# Patient Record
Sex: FEMALE | Race: WHITE | NOT HISPANIC OR LATINO | Employment: OTHER | URBAN - METROPOLITAN AREA
[De-identification: names, ages, dates, MRNs, and addresses within clinical notes are randomized per-mention and may not be internally consistent; named-entity substitution may affect disease eponyms.]

---

## 2019-09-10 ENCOUNTER — OFFICE VISIT CONVERTED (OUTPATIENT)
Dept: CARDIOLOGY | Facility: CLINIC | Age: 74
End: 2019-09-10
Attending: INTERNAL MEDICINE

## 2019-09-13 ENCOUNTER — CONVERSION ENCOUNTER (OUTPATIENT)
Dept: SURGERY | Facility: CLINIC | Age: 74
End: 2019-09-13

## 2019-09-13 ENCOUNTER — OFFICE VISIT CONVERTED (OUTPATIENT)
Dept: SURGERY | Facility: CLINIC | Age: 74
End: 2019-09-13
Attending: SURGERY

## 2019-09-24 ENCOUNTER — OFFICE VISIT CONVERTED (OUTPATIENT)
Dept: CARDIOLOGY | Facility: CLINIC | Age: 74
End: 2019-09-24
Attending: INTERNAL MEDICINE

## 2019-10-15 ENCOUNTER — OFFICE VISIT CONVERTED (OUTPATIENT)
Dept: SURGERY | Facility: CLINIC | Age: 74
End: 2019-10-15
Attending: SURGERY

## 2019-10-15 ENCOUNTER — CONVERSION ENCOUNTER (OUTPATIENT)
Dept: SURGERY | Facility: CLINIC | Age: 74
End: 2019-10-15

## 2021-04-15 ENCOUNTER — OFFICE VISIT CONVERTED (OUTPATIENT)
Dept: ORTHOPEDIC SURGERY | Facility: CLINIC | Age: 76
End: 2021-04-15
Attending: ORTHOPAEDIC SURGERY

## 2021-05-11 NOTE — H&P
History and Physical      Patient Name: Maria Del Rosario Colmenares   Patient ID: [id number]   Sex: Female   YOB: 1945    Referring Provider: Chelsey PARRA    Visit Date: April 15, 2021    Provider: Cici Stanton MD   Location: [facility]   Location Address: [address] [place] [address]  [id number]   Location Phone: [phone]          Chief Complaint  · Left Hip Pain      History Of Present Illness  Maria Del Rosario Colmenares is a 75 year old /White female who presents today to [facility]. Patient is here for evaluation of her left hip and buttock pain. A couple months ago she started having some low back pain that turned into buttock pain and now has some thigh pain. She has taken some Lodine. This has been going on for about 2 months. She has a history of previous back problem about 15 years ago that sound like it required 2 discectomies. Her pain today is located more buttock and down her leg.       Past Medical History  Arthritis; High cholesterol; Hypothyroidism; Screening for colon cancer; Shortness Of Air; Thyroid disorder         Past Surgical History  Appendectomy; Colonoscopy; Hysterectomy; Hysterectomy-Abdominal         Medication List  Lodine 400 mg oral tablet; Synthroid 88 mcg oral tablet         Allergy List  NO KNOWN DRUG ALLERGIES         Family Medical History  Lung Neoplasm, Malignant; Cancer, Unspecified; -Father's Family History Unknown         Social History  Alcohol (Unknown); Alcohol Use (Current some day); Caffeine (Current every day); Recreational Drug Use (Never); Retired.; Second hand smoke exposure (Never); Tobacco (Former);          Review of Systems  · Constitutional  o Denies  o : fever, chills, weight loss  · Cardiovascular  o Denies  o : chest pain, shortness of breath  · Gastrointestinal  o Denies  o : liver disease, heartburn, nausea, blood in stools  · Genitourinary  o Denies  o : painful urination, blood in  "urine  · Integument  o Denies  o : rash, itching  · Neurologic  o Denies  o : headache, weakness, loss of consciousness  · Musculoskeletal  o Denies  o : painful, swollen joints  · Psychiatric  o Denies  o : drug/alcohol addiction, anxiety, depression      Vitals  Date Time BP Position Site L\R Cuff Size HR RR TEMP (F) WT  HT  BMI kg/m2 BSA m2 O2 Sat FR L/min FiO2        04/15/2021 10:25 AM      59 - R   151lbs 4oz 5'  5\" 25.17 1.77 100 %            Physical Examination  · Constitutional  o Appearance  o : well developed, well-nourished, no obvious deformities present  · Head and Face  o Head  o :   § Inspection  § : normocephalic  o Face  o :   § Inspection  § : no facial lesions  · Eyes  o Conjunctivae  o : conjunctivae normal  o Sclerae  o : sclerae white  · Ears, Nose, Mouth and Throat  o Ears  o :   § External Ears  § : appearance within normal limits  § Hearing  § : intact  o Nose  o :   § External Nose  § : appearance normal  · Neck  o Inspection/Palpation  o : normal appearance  o Range of Motion  o : full range of motion  · Respiratory  o Respiratory Effort  o : breathing unlabored  o Inspection of Chest  o : normal appearance  o Auscultation of Lungs  o : no audible wheezing or rales  · Cardiovascular  o Heart  o : regular rate  · Gastrointestinal  o Abdominal Examination  o : soft and non-tender  · Skin and Subcutaneous Tissue  o General Inspection  o : intact, no rashes  · Psychiatric  o General  o : Alert and oriented x3  o Judgement and Insight  o : judgment and insight intact  o Mood and Affect  o : mood normal, affect appropriate  · Left Hip  o Inspection  o : Sensation intact. Pulse is normal. She has mildly positive straight-leg-raise. She walks with no appreciable limp. She has good range of motion. X-rays of her hip shows very mild arthritis.  · In Office Procedures  o View  o : AP/LATERAL  o Site  o : left, hip   o Indication  o : Left hip pain   o Study  o : X-rays ordered, taken in the " office, and reviewed today.  o Xray  o : X-rays of her hip shows very mild arthritis.  o Comparative Data  o : No comparative data found          Assessment  · Primary mild osteoarthritis of left hip     715.15/M16.10  · Low Back Pain     724.2/M54.5  · Left Pain: Hip     719.45/M25.559  · Sciatic leg pain     724.3/M54.30      Plan  · Orders  o Hip (Left) 2 or more views (includes AP Pelvis) [facility] Preferred View ([id number]) - 719.45/M25.559 - 04/15/2021  · Medications  o Medications have been Reconciled  o Transition of Care or Provider Policy  · Instructions  o Reviewed the patient's Past Medical, Social, and Family history as well as the ROS at today's visit, no changes.  o Call or return if worsening symptoms.  o This note is transcribed by Sarai La /josé antonio  o Going to get her in to see a neurosurgeon and see her back to see how she is doing.             Electronically Signed by: Sarai La, -Author on April 16, 2021 03:53:30 PM  Electronically Co-signed by: Cici Stanton MD -Reviewer on April 19, 2021 09:36:51 PM

## 2021-05-14 VITALS — BODY MASS INDEX: 25.2 KG/M2 | HEART RATE: 59 BPM | OXYGEN SATURATION: 100 % | WEIGHT: 151.25 LBS | HEIGHT: 65 IN

## 2021-05-15 VITALS
SYSTOLIC BLOOD PRESSURE: 146 MMHG | DIASTOLIC BLOOD PRESSURE: 113 MMHG | HEART RATE: 123 BPM | HEIGHT: 66 IN | WEIGHT: 146 LBS | BODY MASS INDEX: 23.46 KG/M2

## 2021-05-15 VITALS
WEIGHT: 134 LBS | HEIGHT: 66 IN | SYSTOLIC BLOOD PRESSURE: 138 MMHG | BODY MASS INDEX: 21.53 KG/M2 | DIASTOLIC BLOOD PRESSURE: 60 MMHG | HEART RATE: 92 BPM

## 2021-05-15 VITALS — BODY MASS INDEX: 21.69 KG/M2 | HEIGHT: 66 IN | WEIGHT: 135 LBS | RESPIRATION RATE: 16 BRPM

## 2021-05-15 VITALS — HEIGHT: 66 IN | RESPIRATION RATE: 16 BRPM | BODY MASS INDEX: 22.98 KG/M2 | WEIGHT: 143 LBS

## 2021-06-01 ENCOUNTER — OFFICE VISIT CONVERTED (OUTPATIENT)
Dept: CARDIOLOGY | Facility: CLINIC | Age: 76
End: 2021-06-01
Attending: INTERNAL MEDICINE

## 2021-06-01 ENCOUNTER — TRANSCRIBE ORDERS (OUTPATIENT)
Dept: CARDIOLOGY | Facility: CLINIC | Age: 76
End: 2021-06-01

## 2021-06-01 DIAGNOSIS — I48.91 ATRIAL FIBRILLATION, UNSPECIFIED TYPE (HCC): Primary | ICD-10-CM

## 2021-06-01 DIAGNOSIS — I50.31 ACUTE DIASTOLIC CHF (CONGESTIVE HEART FAILURE) (HCC): ICD-10-CM

## 2021-06-05 NOTE — PROGRESS NOTES
Progress Note      Patient Name: Maria Del Rosario Colmenares   Patient ID: [id number]   Sex: Female   YOB: 1945    Referring Provider: Chelsey PARRA    Visit Date: June 1, 2021    Provider: Jermaine Powell MD   Location: [facility]   Location Address: [address] [place] [address], [address]   [place]  [id number]   Location Phone: [phone]          Chief Complaint     Shortness of breath, atrial fibrillation.       History Of Present Illness  REFERRING CARE PROVIDER: Chelsey PARRA   Maria Del Rosario Colmenares is a 76-year-old female with hypothyroidism and paroxysmal atrial fibrillation. She was previously seen in 2019 for paroxysmal atrial fibrillation. At that time, she was on 50 mg of Metoprolol twice daily. She was lost for followup since then. The patient reports that she did not have any symptoms, hence did not come for followup, however, recently she started developing severe shortness of breath, some swelling of the feet, and also symptoms suggestive of orthopnea. She could not lay down flat since she felt drowned. She was seen by the primary care physician's office and was noted to have atrial fibrillation. She was treated with oral Lasix with potassium supplementation and low dose Metoprolol was restarted. Within one week, most of her symptoms subsided. Orthopnea completely resolved. Shortness of breath significantly improved, and pedal edema improved as well. Currently she does not feel heart racing or pounding. Denies having any chest pain. Some swelling of the feet is still persisting. She was also prescribed Eliquis for anticoagulation which she has not started taking.   PAST MEDICAL HISTORY: (1) Paroxysmal atrial fibrillation. (2) Hypothyroidism. (3) History of perforated appendicitis, status post surgery in 2019.   PSYCHOSOCIAL HISTORY: Rare alcohol use. Previously smoked but quit.   CURRENT MEDICATIONS: Synthroid 88 mcg daily; Metoprolol ER 25 mg daily; Furosemide 20 mg daily;  "potassium 10 mEq daily.      ALLERGIES:  No known drug allergies.       Review of Systems  · Cardiovascular  o Admits  o : swelling (feet, ankles, hands)  o Denies  o : palpitations (fast, fluttering, or skipping beats), shortness of breath while walking or lying flat, chest pain or angina pectoris   · Respiratory  o Denies  o : chronic or frequent cough      Vitals  Date Time BP Position Site L\R Cuff Size HR RR TEMP (F) WT  HT  BMI kg/m2 BSA m2 O2 Sat FR L/min FiO2        06/01/2021 02:15 /90 Sitting    114 - R   145lbs 0oz 5'  6\" 23.4 1.75             Physical Examination  · Respiratory  o Auscultation of Lungs  o : Clear to auscultation bilaterally. No crackles or rhonchi.  · Cardiovascular  o Heart  o : Irregularly irregular, tachycardic. No murmur, rubs, or gallops.   · Gastrointestinal  o Abdominal Examination  o : Soft, nontender, nondistended. No free fluid. Bowel sounds heard in all four quadrants.  · Extremities  o Extremities  o : Trace edema bilaterally. Engorged veins noted bilaterally.          Assessment     ASSESSMENT AND PLAN:  1.  Paroxysmal atrial fibrillation: Patient is back in atrial fibrillation and currently ventricular rates are not well controlled. Recently restarted on metoprolol 25 mg daily. Of note, the patient was not taking Metoprolol for more than a year. We will increase the dose of Metoprolol to 25 mg twice daily for now. Encouraged patient to start taking Eliquis since there is a high risk of stroke based on her CHADS-VASc score. She agreed and will start taking it tonight.    2.  Shortness of breath/pedal edema:  Symptoms and signs are suggestive of congestive heart failure, likely due to atrial fibrillation exacerbation itself. Shortness of breath is much better with Lasix which I will continue. We will proceed with repeat echocardiogram to reassess the LV function. Of note, LV function was normal back in 2019.     3.  Followup close with echocardiogram " report.      Jermaine Powell MD  JV/pap                Electronically Signed by: Annmarie Hunter-, Other -Author on June 2, 2021 12:26:40 PM  Electronically Co-signed by: Jermaine Powell MD -Reviewer on June 2, 2021 12:51:44 PM

## 2021-06-15 ENCOUNTER — TELEPHONE (OUTPATIENT)
Dept: CARDIOLOGY | Facility: CLINIC | Age: 76
End: 2021-06-15

## 2021-06-15 DIAGNOSIS — E78.5 HYPERLIPIDEMIA LDL GOAL <100: Primary | ICD-10-CM

## 2021-06-17 NOTE — TELEPHONE ENCOUNTER
Patient stated that she had labs completed earlier this week at [place]. I will request those records.

## 2021-07-12 ENCOUNTER — TELEPHONE (OUTPATIENT)
Dept: CARDIOLOGY | Facility: CLINIC | Age: 76
End: 2021-07-12

## 2021-07-12 NOTE — TELEPHONE ENCOUNTER
S/W patient regarding results of echo. Advised to continue Eliquis, metoprolol, and Lasix. Advised to f/u in 4-6 weeks. Appt date/time provided.

## 2021-07-12 NOTE — TELEPHONE ENCOUNTER
----- Message from Jermaine Powell MD sent at 7/10/2021  1:36 PM EDT -----  Heart function is mildly reduced.  EF is 46%  There is moderate to severe mitral regurgitation  Pressure inside his right side of the heart is mildly elevated    Continue current medications including metoprolol, Eliquis and Lasix  Will need 4 to 6-week follow-up visit.  Okay to double book if needed.

## 2021-07-15 VITALS
WEIGHT: 145 LBS | HEART RATE: 114 BPM | SYSTOLIC BLOOD PRESSURE: 140 MMHG | BODY MASS INDEX: 23.3 KG/M2 | DIASTOLIC BLOOD PRESSURE: 90 MMHG | HEIGHT: 66 IN

## 2021-08-04 ENCOUNTER — TELEPHONE (OUTPATIENT)
Dept: CARDIOLOGY | Facility: CLINIC | Age: 76
End: 2021-08-04

## 2021-08-04 NOTE — TELEPHONE ENCOUNTER
Okay to proceed with spinal procedure from cardiac standpoint.  Moderate risk for perioperative cardiac events.    Eliquis may be held for 3 days before the procedure.  Recommend to restart 24 hours after the procedure.

## 2021-08-04 NOTE — TELEPHONE ENCOUNTER
Procedure: Lumbar Tranforaminal RUBY    Med Directive: Eliquis (3-7 days)    PMH: Afib    Labs (6/1/2021): cr 0.9

## 2021-08-24 ENCOUNTER — OFFICE VISIT (OUTPATIENT)
Dept: CARDIOLOGY | Facility: CLINIC | Age: 76
End: 2021-08-24

## 2021-08-24 VITALS
HEART RATE: 84 BPM | WEIGHT: 145 LBS | SYSTOLIC BLOOD PRESSURE: 148 MMHG | DIASTOLIC BLOOD PRESSURE: 74 MMHG | BODY MASS INDEX: 23.3 KG/M2 | HEIGHT: 66 IN

## 2021-08-24 DIAGNOSIS — I48.0 PAROXYSMAL ATRIAL FIBRILLATION (HCC): ICD-10-CM

## 2021-08-24 DIAGNOSIS — E78.2 MIXED HYPERLIPIDEMIA: Primary | ICD-10-CM

## 2021-08-24 DIAGNOSIS — I34.0 NONRHEUMATIC MITRAL VALVE REGURGITATION: ICD-10-CM

## 2021-08-24 DIAGNOSIS — I50.32 CHRONIC DIASTOLIC HEART FAILURE (HCC): ICD-10-CM

## 2021-08-24 PROCEDURE — 99214 OFFICE O/P EST MOD 30 MIN: CPT | Performed by: INTERNAL MEDICINE

## 2021-08-24 RX ORDER — ETODOLAC 400 MG/1
TABLET, FILM COATED ORAL
COMMUNITY
End: 2022-03-22 | Stop reason: ALTCHOICE

## 2021-08-24 RX ORDER — FUROSEMIDE 20 MG/1
20 TABLET ORAL AS NEEDED
COMMUNITY
Start: 2021-06-04 | End: 2022-07-29

## 2021-08-24 RX ORDER — METOPROLOL SUCCINATE 25 MG/1
25 TABLET, EXTENDED RELEASE ORAL 2 TIMES DAILY
COMMUNITY

## 2021-08-24 RX ORDER — LEVOTHYROXINE SODIUM 88 MCG
88 TABLET ORAL DAILY
COMMUNITY
Start: 2021-08-16

## 2021-08-24 RX ORDER — ATORVASTATIN CALCIUM 10 MG/1
10 TABLET, FILM COATED ORAL DAILY
Qty: 90 TABLET | Refills: 2 | Status: SHIPPED | OUTPATIENT
Start: 2021-08-24 | End: 2022-04-01 | Stop reason: SDUPTHER

## 2021-08-24 RX ORDER — POTASSIUM CHLORIDE 750 MG/1
10 TABLET, FILM COATED, EXTENDED RELEASE ORAL DAILY
COMMUNITY
Start: 2021-06-04 | End: 2022-07-29

## 2021-08-24 RX ORDER — APIXABAN 5 MG/1
5 TABLET, FILM COATED ORAL 2 TIMES DAILY
COMMUNITY
Start: 2021-05-28

## 2021-08-24 NOTE — PROGRESS NOTES
CARDIOLOGY FOLLOW-UP PROGRESS NOTE        Chief Complaint  Follow-up (Discuss echo), PAF, Shortness of Breath, and Pedal edema    Subjective            Maria Del Rosario Colmenares presents to [facility]  History of Present Illness    This is a 76-year-old female hypothyroidism, paroxysmal atrial fibrillation, chronic combined heart failure who is here for follow-up visit.  Patient was recently seen in early June because of shortness of breath pedal edema and increasing palpitations.  Of note, she was not taking her medications for a long time and developed symptoms including heart failure exacerbation and orthopnea.  She was put back on Lasix and symptoms are already better when we saw her in June.  Heart rate was not well controlled hence metoprolol was restarted.  She was also counseled regarding the need to take Eliquis on a regular basis.  Today patient reports feeling fine and back to her baseline.  She has no major pedal edema.  There are no symptoms or history of orthopnea.  Shortness of breath is back to her baseline.  She does not feel any palpitations or chest pain.  She is taking metoprolol Eliquis and Lasix as prescribed.      Past History:    Medical History: has a past medical history of Arthritis, Chronic combined systolic (congestive) and diastolic (congestive) heart failure (CMS/HCC), Hypothyroidism, Mixed hyperlipidemia, Paroxysmal atrial fibrillation (CMS/HCC), SOB (shortness of breath), and Thyroid disorder.     Surgical History: has a past surgical history that includes Appendectomy; Colonoscopy; Hysterectomy; and Abdominal hysterectomy.     Family History: family history includes Lung cancer in her mother and paternal grandmother; No Known Problems in her father.     Social History: reports that she has quit smoking. She has never used smokeless tobacco. She reports current alcohol use. She reports that she does not use drugs.    Allergies: Patient has no known  "allergies.    Current Outpatient Medications on File Prior to Visit   Medication Sig   • Eliquis 5 MG tablet tablet Take 5 mg by mouth 2 (two) times a day.   • etodolac (Lodine) 400 MG tablet Lodine 400 mg oral tablet take 1 tablet (400 mg) by oral route 2 times per day with food   Active   • furosemide (LASIX) 20 MG tablet 20 mg Daily.   • metoprolol succinate XL (TOPROL-XL) 25 MG 24 hr tablet Take 25 mg by mouth 2 (two) times a day.   • potassium chloride 10 MEQ CR tablet Take 10 mEq by mouth Daily.   • Synthroid 88 MCG tablet Daily.     No current facility-administered medications on file prior to visit.          Review of Systems   Respiratory: Positive for shortness of breath (Mild). Negative for cough and wheezing.    Cardiovascular: Negative for chest pain, palpitations and leg swelling.   Gastrointestinal: Negative for nausea and vomiting.   Neurological: Negative for dizziness and syncope.        Objective     /74   Pulse 84   Ht 167.6 cm (66\")   Wt 65.8 kg (145 lb)   BMI 23.40 kg/m²       Physical Exam  General : Alert, awake, no acute distress  CVS : Irregularly irregular, no murmur, rubs or gallops  Lungs: Clear to auscultation bilaterally, no crackles or rhonchi  Abdomen: Soft, nontender, bowel sounds heard in all 4 quadrants  Extremities: Warm, well-perfused, trace edema bilaterally, engorged veins present      Result Review :     The following data was reviewed by: Jermaine Powell MD on 08/24/2021:    Labs done on 2/2021 showed LDL of 213 triglycerides 129 total cholesterol 283 HDL 59 creatinine 0.9 BUN 17 sodium 142 potassium 4.7 AST 15 ALT 13           Data reviewed: Cardiology studies     Results for orders placed in visit on 07/09/21    Adult Transthoracic Echo Complete w/ Color, Spectral and Contrast if necessary per protocol    Interpretation Summary  · Calculated left ventricular 3D EF = 46% Left ventricular systolic function is mildly decreased.  · There is moderate to severe " biatrial enlargement.  · Moderate to severe mitral valve regurgitation is present.  · Moderate tricuspid valve regurgitation is present.  · Estimated right ventricular systolic pressure from tricuspid regurgitation is mildly elevated (35-45 mmHg).               Assessment and Plan        Diagnoses and all orders for this visit:    1. Mixed hyperlipidemia (Primary)  LDL above 200, discussed the need for pharmacologic therapy with the patient.  She reported having side effects to statins in the past but she is willing to try another 1 if needed.  Recommend to start taking atorvastatin 10 mg at bedtime.  Repeat complete metabolic panel with lipid panel in 3 to 6 months.    2. Paroxysmal atrial fibrillation (CMS/HCC)    Currently appears to be in persistent atrial fibrillation with well-controlled ventricular rate.  Recent echo showed LV ejection fraction 46% with biatrial enlargement.  Continue Eliquis for anticoagulation along with metoprolol for rate control.    3. Nonrheumatic mitral valve regurgitation    Moderate to severe mitral regurgitation per recent echocardiogram.  Nature of the condition and symptoms of severe valvular heart disease discussed needed with the patient.  Will need a repeat echocardiogram in 1 year or earlier if she becomes more symptomatic.    4. Chronic diastolic heart failure (CMS/HCC)    Pedal edema, orthopnea and other symptoms are much better at this time.  She is not volume overloaded on physical examination.  Continue Lasix but as supplementation.    Other orders  -     atorvastatin (LIPITOR) 10 MG tablet; Take 1 tablet by mouth Daily.  Dispense: 90 tablet; Refill: 2      Follow Up     Return in about 6 months (around 2/24/2022) for Recheck, Next scheduled follow up.    Patient was given instructions and counseling regarding her condition or for health maintenance advice. Please see specific information pulled into the AVS if appropriate.

## 2021-11-09 ENCOUNTER — TELEPHONE (OUTPATIENT)
Dept: CARDIOLOGY | Facility: CLINIC | Age: 76
End: 2021-11-09

## 2021-11-09 NOTE — TELEPHONE ENCOUNTER
Procedure: Lumbar Epidural Steroid Injection    Med Directive: Eliquis 3 days prior and 24 hours post    PMH: Chronic combined systolic and diastolic heart failure; HLD; Afib; SOB    Last Seen: 08/24/2021

## 2021-11-10 NOTE — TELEPHONE ENCOUNTER
Okay to proceed with epidural injection from cardiac standpoint.  No further preoperative cardiac work-up is required.  Moderate risk for perioperative cardiac events.    Eliquis may be held for 3 days before the procedure and 24-hour afterwards.      Electronically signed by Jermaine Powell MD, 11/10/21, 2:09 PM EST.

## 2022-03-22 ENCOUNTER — OFFICE VISIT (OUTPATIENT)
Dept: CARDIOLOGY | Facility: CLINIC | Age: 77
End: 2022-03-22

## 2022-03-22 VITALS
SYSTOLIC BLOOD PRESSURE: 127 MMHG | HEART RATE: 84 BPM | BODY MASS INDEX: 23.46 KG/M2 | WEIGHT: 146 LBS | DIASTOLIC BLOOD PRESSURE: 74 MMHG | HEIGHT: 66 IN

## 2022-03-22 DIAGNOSIS — I50.32 CHRONIC DIASTOLIC HEART FAILURE: ICD-10-CM

## 2022-03-22 DIAGNOSIS — I48.0 PAROXYSMAL ATRIAL FIBRILLATION: Primary | ICD-10-CM

## 2022-03-22 DIAGNOSIS — I34.0 NONRHEUMATIC MITRAL VALVE REGURGITATION: ICD-10-CM

## 2022-03-22 DIAGNOSIS — E78.2 MIXED HYPERLIPIDEMIA: ICD-10-CM

## 2022-03-22 PROCEDURE — 99214 OFFICE O/P EST MOD 30 MIN: CPT | Performed by: INTERNAL MEDICINE

## 2022-03-22 RX ORDER — LIDOCAINE 50 MG/G
PATCH TOPICAL
COMMUNITY
Start: 2022-03-09 | End: 2022-07-29

## 2022-03-22 RX ORDER — GABAPENTIN 100 MG/1
CAPSULE ORAL
COMMUNITY
Start: 2022-02-25 | End: 2022-07-29

## 2022-03-31 PROBLEM — I48.0 PAROXYSMAL ATRIAL FIBRILLATION: Status: ACTIVE | Noted: 2022-03-31

## 2022-03-31 PROBLEM — I50.32 CHRONIC DIASTOLIC HEART FAILURE (HCC): Status: ACTIVE | Noted: 2022-03-31

## 2022-03-31 PROBLEM — E78.2 MIXED HYPERLIPIDEMIA: Status: ACTIVE | Noted: 2022-03-31

## 2022-03-31 PROBLEM — I34.0 NONRHEUMATIC MITRAL VALVE REGURGITATION: Status: ACTIVE | Noted: 2022-03-31

## 2022-03-31 NOTE — ASSESSMENT & PLAN NOTE
Moderate to severe mitral regurgitation per echocardiogram done last year.  Patient reports mild shortness of breath which is unchanged.  We will monitor for now and will need repeat echocardiogram later this year to reassess severity.

## 2022-03-31 NOTE — PROGRESS NOTES
CARDIOLOGY FOLLOW-UP PROGRESS NOTE        Chief Complaint  Shortness of Breath and Hyperlipidemia    Subjective            Maria Del Rosario Colmenares presents to [facility]  History of Present Illness    Ms. Colmenares is here for routine 6-month follow-up visit.  During last visit in August 2021, Lipitor was prescribed for LDL above 200.  She is tolerating Lipitor well with no major side effects.  She has not had any palpitations recently.  Shortness of breath is stable.  Denies any chest pain.      Past History:    (1) Paroxysmal atrial fibrillation. (2) Hypothyroidism. (3) History of perforated appendicitis, status post surgery in 2019. (4) chronic combined heart failure    Medical History:  Past Medical History:   Diagnosis Date   • Arthritis    • Chronic combined systolic (congestive) and diastolic (congestive) heart failure (HCC)    • Hypothyroidism    • Mixed hyperlipidemia    • Paroxysmal atrial fibrillation (HCC)    • SOB (shortness of breath)    • Thyroid disorder        Surgical History: has a past surgical history that includes Appendectomy; Colonoscopy; Hysterectomy; and Abdominal hysterectomy.     Family History: family history includes Lung cancer in her mother and paternal grandmother; No Known Problems in her father.     Social History: reports that she has quit smoking. She has never used smokeless tobacco. She reports current alcohol use. She reports that she does not use drugs.    Allergies: Patient has no known allergies.    Current Outpatient Medications on File Prior to Visit   Medication Sig   • atorvastatin (LIPITOR) 10 MG tablet Take 1 tablet by mouth Daily.   • Calcium Carbonate-Vitamin D (calcium-vitamin D) 500-200 MG-UNIT tablet per tablet Oyster Shell Calcium-Vitamin D3 500 mg-5 mcg (200 unit) tablet   • Eliquis 5 MG tablet tablet Take 5 mg by mouth 2 (two) times a day.   • furosemide (LASIX) 20 MG tablet 20 mg As Needed.   • gabapentin (NEURONTIN) 100 MG capsule    •  "lidocaine (LIDODERM) 5 %    • metoprolol succinate XL (TOPROL-XL) 25 MG 24 hr tablet Take 25 mg by mouth 2 (two) times a day.   • potassium chloride 10 MEQ CR tablet Take 10 mEq by mouth Daily.   • Synthroid 88 MCG tablet Daily.     No current facility-administered medications on file prior to visit.          Review of Systems   Respiratory: Positive for shortness of breath. Negative for cough and wheezing.    Cardiovascular: Negative for chest pain, palpitations and leg swelling.   Gastrointestinal: Negative for nausea and vomiting.   Neurological: Negative for dizziness and syncope.        Objective     /74 (BP Location: Right arm, Patient Position: Sitting)   Pulse 84   Ht 167.6 cm (66\")   Wt 66.2 kg (146 lb)   BMI 23.57 kg/m²       Physical Exam    General : Alert, awake, no acute distress  Neck : Supple, no carotid bruit, no jugular venous distention  CVS : Irregularly irregular, systolic murmur left sternal border, no rubs or gallops  Lungs: Clear to auscultation bilaterally, no crackles or rhonchi  Abdomen: Soft, nontender, bowel sounds heard in all 4 quadrants  Extremities: Warm, well-perfused, trace edema bilaterally      The following data was reviewed by: Jermaine Powell MD on 03/22/2022:                             Data reviewed: Cardiology studies        Results for orders placed in visit on 07/09/21    Adult Transthoracic Echo Complete w/ Color, Spectral and Contrast if necessary per protocol    Interpretation Summary  · Calculated left ventricular 3D EF = 46% Left ventricular systolic function is mildly decreased.  · There is moderate to severe biatrial enlargement.  · Moderate to severe mitral valve regurgitation is present.  · Moderate tricuspid valve regurgitation is present.  · Estimated right ventricular systolic pressure from tricuspid regurgitation is mildly elevated (35-45 mmHg).                   Assessment and Plan        Diagnoses and all orders for this visit:    1. Paroxysmal " atrial fibrillation (HCC) (Primary)  Assessment & Plan:  Atrial fibrillation appears to be persistent at this time.  Ventricular rate is well controlled.  Patient denies any palpitations or dizziness.  Continue Eliquis for anticoagulation along with metoprolol for rate control.          2. Chronic diastolic heart failure (HCC)  Assessment & Plan:  She is clinically not volume overloaded.  Continue current dose of Lasix.      3. Mixed hyperlipidemia  Assessment & Plan:  LDL is 140 now down from 200.  Tolerating Lipitor well.  We will increase the dose of Lipitor to 20 mg at bedtime.  Repeat lipid panel before next visit.      4. Nonrheumatic mitral valve regurgitation  Assessment & Plan:  Moderate to severe mitral regurgitation per echocardiogram done last year.  Patient reports mild shortness of breath which is unchanged.  We will monitor for now and will need repeat echocardiogram later this year to reassess severity.                Follow Up     Return in about 6 months (around 9/22/2022) for Next scheduled follow up.    Patient was given instructions and counseling regarding her condition or for health maintenance advice. Please see specific information pulled into the AVS if appropriate.

## 2022-03-31 NOTE — ASSESSMENT & PLAN NOTE
Atrial fibrillation appears to be persistent at this time.  Ventricular rate is well controlled.  Patient denies any palpitations or dizziness.  Continue Eliquis for anticoagulation along with metoprolol for rate control.

## 2022-03-31 NOTE — ASSESSMENT & PLAN NOTE
LDL is 140 now down from 200.  Tolerating Lipitor well.  We will increase the dose of Lipitor to 20 mg at bedtime.  Repeat lipid panel before next visit.

## 2022-04-01 ENCOUNTER — TELEPHONE (OUTPATIENT)
Dept: CARDIOLOGY | Facility: CLINIC | Age: 77
End: 2022-04-01

## 2022-04-01 RX ORDER — ATORVASTATIN CALCIUM 10 MG/1
10 TABLET, FILM COATED ORAL DAILY
Qty: 90 TABLET | Refills: 2 | Status: SHIPPED | OUTPATIENT
Start: 2022-04-01 | End: 2022-07-30 | Stop reason: HOSPADM

## 2022-04-01 NOTE — TELEPHONE ENCOUNTER
S/W patient and made aware of Dr. Powell's recommendations.    ----- Message from Jermaine Powell MD sent at 3/31/2022  4:15 PM EDT -----  Labs done at [facility] in February received.  LDL (bad cholesterol) is 148 at this time, which improved from 200 during previous labs.  However it is still above goal of 100.    Recommend to increase the dose of atorvastatin to 20 mg at bedtime (currently taking 10 mg).  Please send the prescription if the patient is agreeable      Electronically signed by Jermaine Powell MD, 03/31/22, 4:15 PM EDT.

## 2022-04-14 ENCOUNTER — TELEPHONE (OUTPATIENT)
Dept: CARDIOLOGY | Facility: CLINIC | Age: 77
End: 2022-04-14

## 2022-04-14 NOTE — TELEPHONE ENCOUNTER
Eliquis can be held for 3 days before and 24 hours post epidural steroid injection.    Moderate risk for perioperative cardiac events, no further preoperative cardiac work-up required.      Electronically signed by Jermaine Powell MD, 04/14/22, 1:50 PM EDT.

## 2022-04-14 NOTE — TELEPHONE ENCOUNTER
Procedure: Lumbar Epidural Steroid Injection     Med Directive: Eliquis 3 days prior and 24 hours post     PMH: Chronic combined systolic and diastolic heart failure; HLD; Afib; SOB    Last Seen: 03/22/2022

## 2022-06-14 ENCOUNTER — TELEPHONE (OUTPATIENT)
Dept: CARDIOLOGY | Facility: CLINIC | Age: 77
End: 2022-06-14

## 2022-06-15 NOTE — TELEPHONE ENCOUNTER
Eliquis may be held for 3 days before and 24 hours after the lumbar epidural steroid injections.  Moderate risk for perioperative cardiac events.  No further preoperative cardiac work-up required.  She may proceed with the procedure      Electronically signed by Jermaine Powell MD, 06/15/22, 12:28 PM EDT.

## 2022-07-29 ENCOUNTER — APPOINTMENT (OUTPATIENT)
Dept: GENERAL RADIOLOGY | Facility: HOSPITAL | Age: 77
End: 2022-07-29

## 2022-07-29 ENCOUNTER — APPOINTMENT (OUTPATIENT)
Dept: CT IMAGING | Facility: HOSPITAL | Age: 77
End: 2022-07-29

## 2022-07-29 ENCOUNTER — APPOINTMENT (OUTPATIENT)
Dept: MRI IMAGING | Facility: HOSPITAL | Age: 77
End: 2022-07-29

## 2022-07-29 ENCOUNTER — APPOINTMENT (OUTPATIENT)
Dept: CARDIOLOGY | Facility: HOSPITAL | Age: 77
End: 2022-07-29

## 2022-07-29 ENCOUNTER — HOSPITAL ENCOUNTER (OUTPATIENT)
Facility: HOSPITAL | Age: 77
Setting detail: OBSERVATION
Discharge: HOME | End: 2022-07-30
Attending: EMERGENCY MEDICINE | Admitting: STUDENT IN AN ORGANIZED HEALTH CARE EDUCATION/TRAINING PROGRAM

## 2022-07-29 DIAGNOSIS — Z78.9 DECREASED ACTIVITIES OF DAILY LIVING (ADL): ICD-10-CM

## 2022-07-29 DIAGNOSIS — R13.12 DYSPHAGIA, OROPHARYNGEAL: ICD-10-CM

## 2022-07-29 DIAGNOSIS — I63.312 CEREBROVASCULAR ACCIDENT (CVA) DUE TO THROMBOSIS OF LEFT MIDDLE CEREBRAL ARTERY: ICD-10-CM

## 2022-07-29 DIAGNOSIS — I63.9 CEREBROVASCULAR ACCIDENT (CVA), UNSPECIFIED MECHANISM: Primary | ICD-10-CM

## 2022-07-29 DIAGNOSIS — R26.2 DIFFICULTY WALKING: ICD-10-CM

## 2022-07-29 LAB
ALBUMIN SERPL-MCNC: 5 G/DL (ref 3.5–5.2)
ALBUMIN/GLOB SERPL: 1.9 G/DL
ALP SERPL-CCNC: 108 U/L (ref 39–117)
ALT SERPL W P-5'-P-CCNC: 12 U/L (ref 1–33)
ANION GAP SERPL CALCULATED.3IONS-SCNC: 11.5 MMOL/L (ref 5–15)
AST SERPL-CCNC: 17 U/L (ref 1–32)
BASOPHILS # BLD AUTO: 0.01 10*3/MM3 (ref 0–0.2)
BASOPHILS NFR BLD AUTO: 0.1 % (ref 0–1.5)
BH CV ECHO MEAS - AO ROOT DIAM: 3 CM
BH CV ECHO MEAS - EDV(MOD-SP2): 71 ML
BH CV ECHO MEAS - EDV(MOD-SP4): 71 ML
BH CV ECHO MEAS - EF(MOD-BP): 59.4 %
BH CV ECHO MEAS - ESV(MOD-SP2): 30 ML
BH CV ECHO MEAS - ESV(MOD-SP4): 29 ML
BH CV ECHO MEAS - IVSD: 0.9 CM
BH CV ECHO MEAS - LA DIMENSION: 4.5 CM
BH CV ECHO MEAS - LAT PEAK E' VEL: 11.6 CM/SEC
BH CV ECHO MEAS - LVIDD: 4.5 CM
BH CV ECHO MEAS - LVIDS: 3.1 CM
BH CV ECHO MEAS - LVOT DIAM: 2 CM
BH CV ECHO MEAS - LVPWD: 0.9 CM
BH CV ECHO MEAS - MED PEAK E' VEL: 5.98 CM/SEC
BH CV ECHO MEAS - MV DEC TIME: 145 MSEC
BH CV ECHO MEAS - RVDD: 2.3 CM
BH CV ECHO MEAS - TAPSE (>1.6): 1.65 CM
BH CV ECHO MEAS - TR MAX PG: 47 MMHG
BILIRUB SERPL-MCNC: 0.4 MG/DL (ref 0–1.2)
BUN SERPL-MCNC: 18 MG/DL (ref 8–23)
BUN/CREAT SERPL: 20.2 (ref 7–25)
CALCIUM SPEC-SCNC: 10.1 MG/DL (ref 8.6–10.5)
CHLORIDE SERPL-SCNC: 101 MMOL/L (ref 98–107)
CO2 SERPL-SCNC: 26.5 MMOL/L (ref 22–29)
CREAT SERPL-MCNC: 0.89 MG/DL (ref 0.57–1)
DEPRECATED RDW RBC AUTO: 43.8 FL (ref 37–54)
EGFRCR SERPLBLD CKD-EPI 2021: 66.9 ML/MIN/1.73
EOSINOPHIL # BLD AUTO: 0 10*3/MM3 (ref 0–0.4)
EOSINOPHIL NFR BLD AUTO: 0 % (ref 0.3–6.2)
ERYTHROCYTE [DISTWIDTH] IN BLOOD BY AUTOMATED COUNT: 12.7 % (ref 12.3–15.4)
GLOBULIN UR ELPH-MCNC: 2.7 GM/DL
GLUCOSE BLDC GLUCOMTR-MCNC: 133 MG/DL (ref 70–99)
GLUCOSE BLDC GLUCOMTR-MCNC: 181 MG/DL (ref 70–99)
GLUCOSE BLDC GLUCOMTR-MCNC: 186 MG/DL (ref 70–99)
GLUCOSE SERPL-MCNC: 173 MG/DL (ref 65–99)
HCT VFR BLD AUTO: 42.2 % (ref 34–46.6)
HGB BLD-MCNC: 13.9 G/DL (ref 12–15.9)
HOLD SPECIMEN: NORMAL
HOLD SPECIMEN: NORMAL
IMM GRANULOCYTES # BLD AUTO: 0.03 10*3/MM3 (ref 0–0.05)
IMM GRANULOCYTES NFR BLD AUTO: 0.3 % (ref 0–0.5)
INR PPP: 1 (ref 0.86–1.15)
IVRT: 69 MSEC
LEFT ATRIUM VOLUME INDEX: 29.5 ML/M2
LYMPHOCYTES # BLD AUTO: 1.35 10*3/MM3 (ref 0.7–3.1)
LYMPHOCYTES NFR BLD AUTO: 12.4 % (ref 19.6–45.3)
MAXIMAL PREDICTED HEART RATE: 143 BPM
MCH RBC QN AUTO: 30.7 PG (ref 26.6–33)
MCHC RBC AUTO-ENTMCNC: 32.9 G/DL (ref 31.5–35.7)
MCV RBC AUTO: 93.2 FL (ref 79–97)
MONOCYTES # BLD AUTO: 0.56 10*3/MM3 (ref 0.1–0.9)
MONOCYTES NFR BLD AUTO: 5.1 % (ref 5–12)
NEUTROPHILS NFR BLD AUTO: 8.98 10*3/MM3 (ref 1.7–7)
NEUTROPHILS NFR BLD AUTO: 82.1 % (ref 42.7–76)
NRBC BLD AUTO-RTO: 0 /100 WBC (ref 0–0.2)
PLATELET # BLD AUTO: 213 10*3/MM3 (ref 140–450)
PMV BLD AUTO: 10.3 FL (ref 6–12)
POTASSIUM SERPL-SCNC: 4.7 MMOL/L (ref 3.5–5.2)
PROT SERPL-MCNC: 7.7 G/DL (ref 6–8.5)
PROTHROMBIN TIME: 13.3 SECONDS (ref 11.8–14.9)
QT INTERVAL: 349 MS
RBC # BLD AUTO: 4.53 10*6/MM3 (ref 3.77–5.28)
SODIUM SERPL-SCNC: 139 MMOL/L (ref 136–145)
STRESS TARGET HR: 122 BPM
WBC NRBC COR # BLD: 10.93 10*3/MM3 (ref 3.4–10.8)
WHOLE BLOOD HOLD COAG: NORMAL
WHOLE BLOOD HOLD SPECIMEN: NORMAL

## 2022-07-29 PROCEDURE — 93005 ELECTROCARDIOGRAM TRACING: CPT | Performed by: EMERGENCY MEDICINE

## 2022-07-29 PROCEDURE — 96374 THER/PROPH/DIAG INJ IV PUSH: CPT

## 2022-07-29 PROCEDURE — 85610 PROTHROMBIN TIME: CPT | Performed by: EMERGENCY MEDICINE

## 2022-07-29 PROCEDURE — 70496 CT ANGIOGRAPHY HEAD: CPT

## 2022-07-29 PROCEDURE — G0378 HOSPITAL OBSERVATION PER HR: HCPCS

## 2022-07-29 PROCEDURE — 99284 EMERGENCY DEPT VISIT MOD MDM: CPT

## 2022-07-29 PROCEDURE — 85025 COMPLETE CBC W/AUTO DIFF WBC: CPT | Performed by: EMERGENCY MEDICINE

## 2022-07-29 PROCEDURE — 82962 GLUCOSE BLOOD TEST: CPT

## 2022-07-29 PROCEDURE — 71045 X-RAY EXAM CHEST 1 VIEW: CPT

## 2022-07-29 PROCEDURE — 93306 TTE W/DOPPLER COMPLETE: CPT

## 2022-07-29 PROCEDURE — 99220 PR INITIAL OBSERVATION CARE/DAY 70 MINUTES: CPT | Performed by: INTERNAL MEDICINE

## 2022-07-29 PROCEDURE — 99285 EMERGENCY DEPT VISIT HI MDM: CPT

## 2022-07-29 PROCEDURE — 70551 MRI BRAIN STEM W/O DYE: CPT

## 2022-07-29 PROCEDURE — 70498 CT ANGIOGRAPHY NECK: CPT

## 2022-07-29 PROCEDURE — 80053 COMPREHEN METABOLIC PANEL: CPT | Performed by: EMERGENCY MEDICINE

## 2022-07-29 PROCEDURE — 0 IOPAMIDOL PER 1 ML: Performed by: INTERNAL MEDICINE

## 2022-07-29 PROCEDURE — 25010000002 HYDRALAZINE PER 20 MG: Performed by: INTERNAL MEDICINE

## 2022-07-29 PROCEDURE — 70450 CT HEAD/BRAIN W/O DYE: CPT

## 2022-07-29 PROCEDURE — 93010 ELECTROCARDIOGRAM REPORT: CPT | Performed by: INTERNAL MEDICINE

## 2022-07-29 PROCEDURE — 92610 EVALUATE SWALLOWING FUNCTION: CPT

## 2022-07-29 RX ORDER — TRAMADOL HYDROCHLORIDE 50 MG/1
50 TABLET ORAL 2 TIMES DAILY
COMMUNITY
Start: 2022-04-21

## 2022-07-29 RX ORDER — METOPROLOL SUCCINATE 25 MG/1
25 TABLET, EXTENDED RELEASE ORAL EVERY 12 HOURS SCHEDULED
Status: DISCONTINUED | OUTPATIENT
Start: 2022-07-29 | End: 2022-07-30 | Stop reason: HOSPADM

## 2022-07-29 RX ORDER — ATORVASTATIN CALCIUM 40 MG/1
80 TABLET, FILM COATED ORAL NIGHTLY
Status: DISCONTINUED | OUTPATIENT
Start: 2022-07-29 | End: 2022-07-30 | Stop reason: HOSPADM

## 2022-07-29 RX ORDER — SODIUM CHLORIDE 9 MG/ML
40 INJECTION, SOLUTION INTRAVENOUS AS NEEDED
Status: DISCONTINUED | OUTPATIENT
Start: 2022-07-29 | End: 2022-07-30 | Stop reason: HOSPADM

## 2022-07-29 RX ORDER — SODIUM CHLORIDE 0.9 % (FLUSH) 0.9 %
10 SYRINGE (ML) INJECTION AS NEEDED
Status: DISCONTINUED | OUTPATIENT
Start: 2022-07-29 | End: 2022-07-30 | Stop reason: HOSPADM

## 2022-07-29 RX ORDER — ASPIRIN 81 MG/1
81 TABLET, CHEWABLE ORAL DAILY
Status: DISCONTINUED | OUTPATIENT
Start: 2022-07-29 | End: 2022-07-30 | Stop reason: HOSPADM

## 2022-07-29 RX ORDER — SODIUM CHLORIDE 0.9 % (FLUSH) 0.9 %
10 SYRINGE (ML) INJECTION EVERY 12 HOURS SCHEDULED
Status: DISCONTINUED | OUTPATIENT
Start: 2022-07-29 | End: 2022-07-30 | Stop reason: HOSPADM

## 2022-07-29 RX ORDER — HYDRALAZINE HYDROCHLORIDE 20 MG/ML
10 INJECTION INTRAMUSCULAR; INTRAVENOUS EVERY 6 HOURS PRN
Status: DISCONTINUED | OUTPATIENT
Start: 2022-07-29 | End: 2022-07-30 | Stop reason: HOSPADM

## 2022-07-29 RX ORDER — ASPIRIN 300 MG/1
300 SUPPOSITORY RECTAL DAILY
Status: DISCONTINUED | OUTPATIENT
Start: 2022-07-29 | End: 2022-07-30 | Stop reason: HOSPADM

## 2022-07-29 RX ORDER — LEVOTHYROXINE SODIUM 88 UG/1
88 TABLET ORAL
Status: DISCONTINUED | OUTPATIENT
Start: 2022-07-30 | End: 2022-07-30 | Stop reason: HOSPADM

## 2022-07-29 RX ADMIN — APIXABAN 5 MG: 5 TABLET, FILM COATED ORAL at 21:09

## 2022-07-29 RX ADMIN — METOPROLOL SUCCINATE 25 MG: 25 TABLET, EXTENDED RELEASE ORAL at 21:09

## 2022-07-29 RX ADMIN — ATORVASTATIN CALCIUM 80 MG: 40 TABLET, FILM COATED ORAL at 21:09

## 2022-07-29 RX ADMIN — HYDRALAZINE HYDROCHLORIDE 10 MG: 20 INJECTION INTRAMUSCULAR; INTRAVENOUS at 14:36

## 2022-07-29 RX ADMIN — Medication 10 ML: at 21:10

## 2022-07-29 RX ADMIN — IOPAMIDOL 100 ML: 755 INJECTION, SOLUTION INTRAVENOUS at 15:54

## 2022-07-29 RX ADMIN — APIXABAN 5 MG: 5 TABLET, FILM COATED ORAL at 12:57

## 2022-07-29 RX ADMIN — ASPIRIN 81 MG CHEWABLE TABLET 81 MG: 81 TABLET CHEWABLE at 12:57

## 2022-07-29 RX ADMIN — Medication 10 ML: at 12:57

## 2022-07-29 RX ADMIN — METOPROLOL SUCCINATE 25 MG: 25 TABLET, EXTENDED RELEASE ORAL at 12:57

## 2022-07-30 VITALS
SYSTOLIC BLOOD PRESSURE: 133 MMHG | RESPIRATION RATE: 16 BRPM | BODY MASS INDEX: 23.46 KG/M2 | HEART RATE: 84 BPM | HEIGHT: 67 IN | WEIGHT: 149.47 LBS | DIASTOLIC BLOOD PRESSURE: 72 MMHG | OXYGEN SATURATION: 97 % | TEMPERATURE: 97.2 F

## 2022-07-30 LAB
ANION GAP SERPL CALCULATED.3IONS-SCNC: 9 MMOL/L (ref 5–15)
BUN SERPL-MCNC: 17 MG/DL (ref 8–23)
BUN/CREAT SERPL: 21.5 (ref 7–25)
CALCIUM SPEC-SCNC: 9.5 MG/DL (ref 8.6–10.5)
CHLORIDE SERPL-SCNC: 105 MMOL/L (ref 98–107)
CHOLEST SERPL-MCNC: 314 MG/DL (ref 0–200)
CO2 SERPL-SCNC: 27 MMOL/L (ref 22–29)
CREAT SERPL-MCNC: 0.79 MG/DL (ref 0.57–1)
DEPRECATED RDW RBC AUTO: 42.6 FL (ref 37–54)
EGFRCR SERPLBLD CKD-EPI 2021: 77.2 ML/MIN/1.73
ERYTHROCYTE [DISTWIDTH] IN BLOOD BY AUTOMATED COUNT: 12.9 % (ref 12.3–15.4)
GLUCOSE BLDC GLUCOMTR-MCNC: 131 MG/DL (ref 70–99)
GLUCOSE SERPL-MCNC: 114 MG/DL (ref 65–99)
HBA1C MFR BLD: 6.1 % (ref 4.8–5.6)
HCT VFR BLD AUTO: 40 % (ref 34–46.6)
HDLC SERPL-MCNC: 60 MG/DL (ref 40–60)
HGB BLD-MCNC: 13.3 G/DL (ref 12–15.9)
LDLC SERPL CALC-MCNC: 213 MG/DL (ref 0–100)
LDLC/HDLC SERPL: 3.53 {RATIO}
MAGNESIUM SERPL-MCNC: 2.1 MG/DL (ref 1.6–2.4)
MCH RBC QN AUTO: 29.9 PG (ref 26.6–33)
MCHC RBC AUTO-ENTMCNC: 33.3 G/DL (ref 31.5–35.7)
MCV RBC AUTO: 89.9 FL (ref 79–97)
PLATELET # BLD AUTO: 203 10*3/MM3 (ref 140–450)
PMV BLD AUTO: 10.1 FL (ref 6–12)
POTASSIUM SERPL-SCNC: 4.2 MMOL/L (ref 3.5–5.2)
RBC # BLD AUTO: 4.45 10*6/MM3 (ref 3.77–5.28)
SODIUM SERPL-SCNC: 141 MMOL/L (ref 136–145)
TRIGL SERPL-MCNC: 210 MG/DL (ref 0–150)
VLDLC SERPL-MCNC: 41 MG/DL (ref 5–40)
WBC NRBC COR # BLD: 7.56 10*3/MM3 (ref 3.4–10.8)

## 2022-07-30 PROCEDURE — 83735 ASSAY OF MAGNESIUM: CPT | Performed by: INTERNAL MEDICINE

## 2022-07-30 PROCEDURE — 97165 OT EVAL LOW COMPLEX 30 MIN: CPT

## 2022-07-30 PROCEDURE — 80061 LIPID PANEL: CPT | Performed by: INTERNAL MEDICINE

## 2022-07-30 PROCEDURE — 83036 HEMOGLOBIN GLYCOSYLATED A1C: CPT | Performed by: INTERNAL MEDICINE

## 2022-07-30 PROCEDURE — 82962 GLUCOSE BLOOD TEST: CPT

## 2022-07-30 PROCEDURE — 85027 COMPLETE CBC AUTOMATED: CPT | Performed by: INTERNAL MEDICINE

## 2022-07-30 PROCEDURE — 99217 PR OBSERVATION CARE DISCHARGE MANAGEMENT: CPT | Performed by: STUDENT IN AN ORGANIZED HEALTH CARE EDUCATION/TRAINING PROGRAM

## 2022-07-30 PROCEDURE — 80048 BASIC METABOLIC PNL TOTAL CA: CPT | Performed by: INTERNAL MEDICINE

## 2022-07-30 PROCEDURE — 97161 PT EVAL LOW COMPLEX 20 MIN: CPT

## 2022-07-30 PROCEDURE — G0378 HOSPITAL OBSERVATION PER HR: HCPCS

## 2022-07-30 PROCEDURE — 36415 COLL VENOUS BLD VENIPUNCTURE: CPT | Performed by: INTERNAL MEDICINE

## 2022-07-30 RX ORDER — ATORVASTATIN CALCIUM 80 MG/1
80 TABLET, FILM COATED ORAL NIGHTLY
Qty: 30 TABLET | Refills: 0 | Status: SHIPPED | OUTPATIENT
Start: 2022-07-30 | End: 2022-08-29

## 2022-07-30 RX ORDER — ASPIRIN 81 MG/1
81 TABLET, CHEWABLE ORAL DAILY
Qty: 30 TABLET | Refills: 0 | Status: SHIPPED | OUTPATIENT
Start: 2022-07-31 | End: 2022-08-30

## 2022-07-30 RX ADMIN — LEVOTHYROXINE SODIUM 88 MCG: 0.09 TABLET ORAL at 05:29

## 2022-07-30 RX ADMIN — METOPROLOL SUCCINATE 25 MG: 25 TABLET, EXTENDED RELEASE ORAL at 08:49

## 2022-07-30 RX ADMIN — Medication 10 ML: at 08:50

## 2022-07-30 RX ADMIN — APIXABAN 5 MG: 5 TABLET, FILM COATED ORAL at 08:49

## 2022-07-30 RX ADMIN — ASPIRIN 81 MG CHEWABLE TABLET 81 MG: 81 TABLET CHEWABLE at 08:49

## 2022-07-31 ENCOUNTER — READMISSION MANAGEMENT (OUTPATIENT)
Dept: CALL CENTER | Facility: HOSPITAL | Age: 77
End: 2022-07-31

## 2022-07-31 NOTE — OUTREACH NOTE
Prep Survey    Flowsheet Row Responses   [facility] patient discharged from? [facility]   Is LACE score < 7 ? No   Emergency Room discharge w/ pulse ox? No   Eligibility Readm Mgmt   Discharge diagnosis Stroke    Does the patient have one of the following disease processes/diagnoses(primary or secondary)? Stroke (TIA)   Does the patient have Home health ordered? No   Is there a DME ordered? No   Prep survey completed? Yes          DAYNA DONIS - Registered Nurse

## 2022-08-03 ENCOUNTER — READMISSION MANAGEMENT (OUTPATIENT)
Dept: CALL CENTER | Facility: HOSPITAL | Age: 77
End: 2022-08-03

## 2022-08-03 NOTE — OUTREACH NOTE
Stroke Week 1 Survey    Flowsheet Row Responses   [facility] facility patient discharged from? [facility]   Does the patient have one of the following disease processes/diagnoses(primary or secondary)? Stroke (TIA)   Call start time 0841   Rescheduled Rescheduled-pt requested  [Dtr stated she would like to listen while HH was evaluating the pt at time of call.]   Call end time 0842   Discharge diagnosis Stroke    Person spoke with today (if not patient) and relationship Ailyn-daughter   What is the Home health agency?  [facility]   Has home health visited the patient within 72 hours of discharge? Yes   Week 1 call completed? Yes          REENA DONIS - Registered Nurse

## 2022-08-11 ENCOUNTER — READMISSION MANAGEMENT (OUTPATIENT)
Dept: CALL CENTER | Facility: HOSPITAL | Age: 77
End: 2022-08-11

## 2022-08-11 NOTE — OUTREACH NOTE
Stroke Week 2 Survey    Flowsheet Row Responses   [facility] facility patient discharged from? [facility]   Does the patient have one of the following disease processes/diagnoses(primary or secondary)? Stroke (TIA)   Week 2 attempt successful? No   Unsuccessful attempts Attempt 1  [attempted pt home, cell and emergency contact per CM notes]   Discharge diagnosis Stroke           HCRYSTAL CABALLERO - Registered Nurse

## 2022-08-15 ENCOUNTER — TELEPHONE (OUTPATIENT)
Dept: PHYSICAL THERAPY | Facility: CLINIC | Age: 77
End: 2022-08-15

## 2022-08-15 NOTE — TELEPHONE ENCOUNTER
I spoke with patient as she was a no show for a speech evaluation secondary to CVA. She indicated she is receiving home health for speech therapy.      Patient informed I am cancelling all further appointments as she is unable to  receive both outpatient and home health speech therapy at the same time.

## 2022-08-23 ENCOUNTER — READMISSION MANAGEMENT (OUTPATIENT)
Dept: CALL CENTER | Facility: HOSPITAL | Age: 77
End: 2022-08-23

## 2022-08-23 NOTE — OUTREACH NOTE
Stroke Week 4 Survey    Flowsheet Row Responses   [facility] patient discharged from? [facility]   Does the patient have one of the following disease processes/diagnoses(primary or secondary)? Stroke (TIA)   Week 4 attempt successful? 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 Yes   List who call center can speak with Мария-friend    Person spoke with today (if not patient) and relationship Pt and Мария   Meds reviewed with patient/caregiver? Yes   Is the patient taking all medications as directed (includes completed medication regime)? Yes   Has the patient kept scheduled appointments due by today? Yes   Comments PCP appt was last week   Is the patient still receiving Home Health Services? Yes   Does the patient require any assistance with activities of daily living such as eating, bathing, dressing, walking, etc.? No   Does the patient have any residual symptoms from stroke/TIA? No   Does the patient understand the diet ordered at discharge? Yes   What is the patient's perception of their health status since discharge? Improving   Is the patient able to teach back FAST for Stroke? Yes   Is the patient/caregiver able to teach back the risk factors for a stroke? High blood pressure-goal below 120/80, Smoking, Diabetes, High Cholesterol   Is the patient/caregiver able to teach back signs and symptoms related to disease process for when to call PCP? Yes   Is the patient/caregiver able to teach back signs and symptoms related to disease process for when to call 911? Yes   If the patient is a current smoker, are they able to teach back resources for cessation? Not a smoker   Is the patient/caregiver able to teach back the hierarchy of who to call/visit for symptoms/problems? PCP, Specialist, Home health nurse, Urgent Care, ED, 911 Yes   Week 4 Call  Completed? Yes   Would the patient like one additional call? No   Graduated Yes   Is the patient interested in additional calls from an ambulatory ?  NOTE:  applies to high risk patients requiring additional follow-up. No   Did the patient feel the follow up calls were helpful during their recovery period? Yes   Was the number of calls appropriate? Yes          MAXIMILIAN GALVAN - Registered Nurse

## 2022-10-10 ENCOUNTER — OFFICE VISIT (OUTPATIENT)
Dept: CARDIOLOGY | Facility: CLINIC | Age: 77
End: 2022-10-10

## 2022-10-10 VITALS
BODY MASS INDEX: 22.63 KG/M2 | WEIGHT: 144.2 LBS | SYSTOLIC BLOOD PRESSURE: 146 MMHG | HEART RATE: 95 BPM | HEIGHT: 67 IN | DIASTOLIC BLOOD PRESSURE: 46 MMHG

## 2022-10-10 DIAGNOSIS — E78.2 MIXED HYPERLIPIDEMIA: ICD-10-CM

## 2022-10-10 DIAGNOSIS — I48.0 PAROXYSMAL ATRIAL FIBRILLATION: Primary | ICD-10-CM

## 2022-10-10 DIAGNOSIS — I34.0 NONRHEUMATIC MITRAL VALVE REGURGITATION: ICD-10-CM

## 2022-10-10 PROCEDURE — 99213 OFFICE O/P EST LOW 20 MIN: CPT | Performed by: INTERNAL MEDICINE

## 2022-10-10 RX ORDER — ROSUVASTATIN CALCIUM 10 MG/1
10 TABLET, COATED ORAL NIGHTLY
Qty: 90 TABLET | Refills: 3 | Status: SHIPPED | OUTPATIENT
Start: 2022-10-10

## 2022-10-10 RX ORDER — ASPIRIN 81 MG/1
TABLET, COATED ORAL
COMMUNITY
Start: 2022-08-22

## 2022-10-10 NOTE — ASSESSMENT & PLAN NOTE
Moderate to severe eccentric mitral regurgitation noted on recent echocardiogram done during hospital stay, unchanged from before.  She has some shortness of breath which is stable.  She is clinically not volume overloaded.  She will need a repeat echocardiogram next year or earlier if symptomatic.

## 2022-10-10 NOTE — PROGRESS NOTES
CARDIOLOGY FOLLOW-UP PROGRESS NOTE        Chief Complaint  Hyperlipidemia, Follow-up, and Atrial Fibrillation    Subjective            Maria Del Rosario Colmenares presents to [facility] CARDIOLOGY  History of Present Illness      Ms Colmenares is here for routine 6-month follow-up visit.  She had admission to hospital in late July because of speech difficulty and confusion.  She was subsequently diagnosed with acute stroke, with an acute infarct involving left MCA M2 subdivision.  Conservative management was recommended since patient was late in the presentation.  Of note, she was holding Eliquis for 6 days for epidural injections of the back immediately before the event.  At the time of admission, her LDL was noted to be 212.  Of note, she was taking rosuvastatin 20 mg during previous office visit but the medication dropped off from her list.  She was discharged on atorvastatin 80 mg daily.  However she is not taking any statin at this time.    Today patient reports that, she still has some difficulty in word finding and also has memory loss.  At times, it is hard for her to engage in a meaningful conversation.  She was seen by speech pathologist couple of times, who advised that no regular follow-up needed.  She currently denies having any palpitations.  She denies having any chest pain.  She is taking Eliquis and metoprolol as prescribed.      Past History:    (1) Paroxysmal atrial fibrillation. (2) Hypothyroidism. (3) History of perforated appendicitis, status post surgery in 2019. (4) chronic combined heart failure (5) acute stroke involving right MCA territory with expressive aphasia and memory problems.  Episode happened in the setting of holding anticoagulation for epidural injections.    Medical History:  Past Medical History:   Diagnosis Date   • Arthritis    • Chronic combined systolic (congestive) and diastolic (congestive) heart failure (HCC)    • Hypothyroidism    • Mixed hyperlipidemia    •  "Paroxysmal atrial fibrillation (HCC)    • SOB (shortness of breath)    • Thyroid disorder        Surgical History: has a past surgical history that includes Appendectomy; Colonoscopy; Hysterectomy; and Abdominal hysterectomy.     Family History: family history includes Lung cancer in her mother and paternal grandmother; No Known Problems in her father.     Social History: reports that she has quit smoking. She has never used smokeless tobacco. She reports current alcohol use. She reports that she does not use drugs.    Allergies: Patient has no known allergies.    Current Outpatient Medications on File Prior to Visit   Medication Sig   • Aspirin Adult Low Dose 81 MG EC tablet    • Eliquis 5 MG tablet tablet Take 5 mg by mouth 2 (two) times a day.   • metoprolol succinate XL (TOPROL-XL) 25 MG 24 hr tablet Take 25 mg by mouth 2 (two) times a day.   • Synthroid 88 MCG tablet Take 88 mcg by mouth Daily.   • traMADol (ULTRAM) 50 MG tablet Take 50 mg by mouth 2 (Two) Times a Day.     No current facility-administered medications on file prior to visit.          Review of Systems   Respiratory: Negative for cough, shortness of breath and wheezing.    Cardiovascular: Negative for chest pain, palpitations and leg swelling.   Gastrointestinal: Negative for nausea and vomiting.   Neurological: Positive for speech difficulty and memory problem. Negative for dizziness and syncope.        Objective     /46 (BP Location: Left arm, Patient Position: Sitting)   Pulse 95   Ht 170.2 cm (67\")   Wt 65.4 kg (144 lb 3.2 oz)   BMI 22.58 kg/m²       Physical Exam    General : Alert, awake, no acute distress  Neck : Supple, no carotid bruit, no jugular venous distention  CVS : Irregularly irregular, no murmur, rubs or gallops  Lungs: Clear to auscultation bilaterally, no crackles or rhonchi  Abdomen: Soft, nontender, bowel sounds heard in all 4 quadrants  Extremities: Warm, well-perfused, no pedal edema    Result Review :     The " following data was reviewed by: Jermaine Powell MD on 10/10/2022:    CMP    CMP 7/29/22 7/30/22   Glucose 173 (A) 114 (A)   BUN 18 17   Creatinine 0.89 0.79   Sodium 139 141   Potassium 4.7 4.2   Chloride 101 105   Calcium 10.1 9.5   Albumin 5.00    Total Bilirubin 0.4    Alkaline Phosphatase 108    AST (SGOT) 17    ALT (SGPT) 12    (A) Abnormal value            CBC    CBC 7/29/22 7/30/22   WBC 10.93 (A) 7.56   RBC 4.53 4.45   Hemoglobin 13.9 13.3   Hematocrit 42.2 40.0   MCV 93.2 89.9   MCH 30.7 29.9   MCHC 32.9 33.3   RDW 12.7 12.9   Platelets 213 203   (A) Abnormal value              Lipid Panel    Lipid Panel 7/30/22   Total Cholesterol 314 (A)   Triglycerides 210 (A)   HDL Cholesterol 60   VLDL Cholesterol 41 (A)   LDL Cholesterol  213 (A)   LDL/HDL Ratio 3.53   (A) Abnormal value                 Data reviewed: Cardiology studies        Results for orders placed during the hospital encounter of 07/29/22    Adult Transthoracic Echo Complete W/ Cont if Necessary Per Protocol (With Agitated Saline)    Interpretation Summary  · Left ventricular ejection fraction appears to be 56 - 60%.  · Estimated right ventricular systolic pressure from tricuspid regurgitation is moderately elevated (45-55 mmHg).  · The right atrial cavity is mildly dilated.    There were no apparent intracardiac masses, vegetations or thrombi.    The mitral valve is grossly normal in structure. Moderate to severe eccentric mitral regurgitation. There is no stenosis.                       Assessment and Plan        Diagnoses and all orders for this visit:    1. Paroxysmal atrial fibrillation (HCC) (Primary)  Assessment & Plan:  She appears to be in persistent atrial fibrillation with controlled ventricular rates.  Continue Eliquis for anticoagulation along with metoprolol for rate control.  Of note, she had a recent stroke while holding anticoagulation for 6 days for epidural injections.  Counseled the importance of uninterrupted  anticoagulation, especially since she had a stroke recently.  Patient verbalized understanding.      2. Mixed hyperlipidemia  Assessment & Plan:  Most recent labs done during admission for stroke showed LDL of 212 which is significantly elevated.  She was on Crestor in the past and was tolerating well.  We will reinitiate Crestor 10 mg p.o. daily, we will check lipid panel before next visit and adjust the dose as tolerated     Orders:  -     rosuvastatin (CRESTOR) 10 MG tablet; Take 1 tablet by mouth Every Night.  Dispense: 90 tablet; Refill: 3    3. Nonrheumatic mitral valve regurgitation  Assessment & Plan:  Moderate to severe eccentric mitral regurgitation noted on recent echocardiogram done during hospital stay, unchanged from before.  She has some shortness of breath which is stable.  She is clinically not volume overloaded.  She will need a repeat echocardiogram next year or earlier if symptomatic.              Follow Up     Return in about 6 months (around 4/10/2023) for Next scheduled follow up, oK to double book if needed .    Patient was given instructions and counseling regarding her condition or for health maintenance advice. Please see specific information pulled into the AVS if appropriate.

## 2022-10-10 NOTE — ASSESSMENT & PLAN NOTE
She appears to be in persistent atrial fibrillation with controlled ventricular rates.  Continue Eliquis for anticoagulation along with metoprolol for rate control.  Of note, she had a recent stroke while holding anticoagulation for 6 days for epidural injections.  Counseled the importance of uninterrupted anticoagulation, especially since she had a stroke recently.  Patient verbalized understanding.

## 2022-10-10 NOTE — ASSESSMENT & PLAN NOTE
Most recent labs done during admission for stroke showed LDL of 212 which is significantly elevated.  She was on Crestor in the past and was tolerating well.  We will reinitiate Crestor 10 mg p.o. daily, we will check lipid panel before next visit and adjust the dose as tolerated

## 2023-02-14 ENCOUNTER — TRANSCRIBE ORDERS (OUTPATIENT)
Dept: ADMINISTRATIVE | Facility: HOSPITAL | Age: 78
End: 2023-02-14
Payer: MEDICARE

## 2023-02-14 DIAGNOSIS — Z12.31 VISIT FOR SCREENING MAMMOGRAM: Primary | ICD-10-CM

## 2023-02-14 DIAGNOSIS — M81.8 DISUSE OSTEOPOROSIS WITHOUT PATHOLOGICAL FRACTURE: ICD-10-CM

## 2023-02-15 ENCOUNTER — HOSPITAL ENCOUNTER (OUTPATIENT)
Dept: MAMMOGRAPHY | Facility: HOSPITAL | Age: 78
Discharge: HOME | End: 2023-02-15
Payer: MEDICARE

## 2023-02-15 ENCOUNTER — HOSPITAL ENCOUNTER (OUTPATIENT)
Dept: BONE DENSITY | Facility: HOSPITAL | Age: 78
Discharge: HOME | End: 2023-02-15
Payer: MEDICARE

## 2023-02-15 DIAGNOSIS — M81.8 DISUSE OSTEOPOROSIS WITHOUT PATHOLOGICAL FRACTURE: ICD-10-CM

## 2023-02-15 DIAGNOSIS — Z12.31 VISIT FOR SCREENING MAMMOGRAM: ICD-10-CM

## 2023-02-15 PROCEDURE — 77080 DXA BONE DENSITY AXIAL: CPT

## 2023-02-15 PROCEDURE — 77063 BREAST TOMOSYNTHESIS BI: CPT

## 2023-02-15 PROCEDURE — 77067 SCR MAMMO BI INCL CAD: CPT

## 2023-04-17 ENCOUNTER — OFFICE VISIT (OUTPATIENT)
Dept: CARDIOLOGY | Facility: CLINIC | Age: 78
End: 2023-04-17
Payer: MEDICARE

## 2023-04-17 VITALS
BODY MASS INDEX: 23.23 KG/M2 | HEART RATE: 92 BPM | DIASTOLIC BLOOD PRESSURE: 79 MMHG | HEIGHT: 67 IN | WEIGHT: 148 LBS | SYSTOLIC BLOOD PRESSURE: 148 MMHG

## 2023-04-17 DIAGNOSIS — E78.2 MIXED HYPERLIPIDEMIA: ICD-10-CM

## 2023-04-17 DIAGNOSIS — I34.0 NONRHEUMATIC MITRAL VALVE REGURGITATION: ICD-10-CM

## 2023-04-17 DIAGNOSIS — I48.0 PAROXYSMAL ATRIAL FIBRILLATION: Primary | ICD-10-CM

## 2023-04-17 DIAGNOSIS — I50.32 CHRONIC DIASTOLIC HEART FAILURE: ICD-10-CM

## 2023-04-17 PROCEDURE — 99213 OFFICE O/P EST LOW 20 MIN: CPT | Performed by: INTERNAL MEDICINE

## 2023-04-17 PROCEDURE — 1160F RVW MEDS BY RX/DR IN RCRD: CPT | Performed by: INTERNAL MEDICINE

## 2023-04-17 PROCEDURE — 1159F MED LIST DOCD IN RCRD: CPT | Performed by: INTERNAL MEDICINE

## 2023-04-17 RX ORDER — LEVOTHYROXINE SODIUM 75 MCG
TABLET ORAL
COMMUNITY
Start: 2023-02-01

## 2023-04-17 RX ORDER — FUROSEMIDE 20 MG/1
TABLET ORAL
COMMUNITY
Start: 2023-02-01

## 2023-04-17 RX ORDER — POTASSIUM CHLORIDE 750 MG/1
TABLET, FILM COATED, EXTENDED RELEASE ORAL
COMMUNITY
Start: 2023-02-01

## 2023-04-17 NOTE — ASSESSMENT & PLAN NOTE
She is currently taking rosuvastatin 20 mg nightly.  Labs done in February showed LDL of 76, which is near goal and significantly improved from previous levels ( > 200) .  Continue the current dose of rosuvastatin.

## 2023-04-17 NOTE — ASSESSMENT & PLAN NOTE
She is in persistent atrial fibrillation with controlled ventricular rates.  Denies any significant palpitations.  Recent labs showed stable electrolytes and hemoglobin.  Continue Eliquis for anticoagulation due to high [id number] 2 score including history of previous stroke.  Continue metoprolol for rate control.

## 2023-04-17 NOTE — ASSESSMENT & PLAN NOTE
She is clinically not volume overloaded and denies any recent weight gain.  Continue Lasix with potassium.

## 2023-04-17 NOTE — ASSESSMENT & PLAN NOTE
Moderate to severe mitral regurgitation per echocardiogram done last year.  Systolic murmur audible.  We will continue to monitor, repeat echocardiogram in 2024.

## 2023-04-17 NOTE — PROGRESS NOTES
CARDIOLOGY FOLLOW-UP PROGRESS NOTE        Chief Complaint  Follow-up and Atrial Fibrillation    Subjective            Maria Del Rosario Colmenares presents to [facility] CARDIOLOGY  History of Present Illness    Ms Colmenares is here for routine 6-month follow-up visit.  She denies any palpitations, dizziness or syncopal episodes.  Per patient, she still has difficulty in word finding ever since she had a stroke.  However, this is not apparent on routine conversation.  She is taking all other medications including Eliquis and statins as prescribed.      Past History:    (1) Paroxysmal atrial fibrillation. (2) Hypothyroidism. (3) History of perforated appendicitis, status post surgery in 2019. (4) chronic combined heart failure (5) acute stroke involving right MCA territory with expressive aphasia and memory problems.  Episode happened in the setting of holding anticoagulation for epidural injections.    Medical History:  Past Medical History:   Diagnosis Date   • Arthritis    • Chronic combined systolic (congestive) and diastolic (congestive) heart failure    • Hypothyroidism    • Mixed hyperlipidemia    • Paroxysmal atrial fibrillation    • SOB (shortness of breath)    • Thyroid disorder        Surgical History: has a past surgical history that includes Appendectomy; Colonoscopy; Hysterectomy; and Abdominal hysterectomy.     Family History: family history includes Lung cancer in her mother and paternal grandmother; No Known Problems in her father.     Social History: reports that she has quit smoking. She has never used smokeless tobacco. She reports current alcohol use. She reports that she does not use drugs.    Allergies: Patient has no known allergies.    Current Outpatient Medications on File Prior to Visit   Medication Sig   • Aspirin Adult Low Dose 81 MG EC tablet    • Eliquis 5 MG tablet tablet Take 1 tablet by mouth 2 (two) times a day.   • furosemide (LASIX) 20 MG tablet    • metoprolol succinate XL  "(TOPROL-XL) 25 MG 24 hr tablet Take 1 tablet by mouth 2 (two) times a day.   • potassium chloride 10 MEQ CR tablet    • rosuvastatin (CRESTOR) 10 MG tablet Take 1 tablet by mouth Every Night.   • Synthroid 75 MCG tablet    • [DISCONTINUED] Synthroid 88 MCG tablet Take 1 tablet by mouth Daily.   • traMADol (ULTRAM) 50 MG tablet Take 50 mg by mouth 2 (Two) Times a Day. (Patient not taking: Reported on 4/17/2023)     No current facility-administered medications on file prior to visit.          Review of Systems   Respiratory: Negative for cough, shortness of breath and wheezing.    Cardiovascular: Negative for chest pain, palpitations and leg swelling.   Gastrointestinal: Negative for nausea and vomiting.   Neurological: Negative for dizziness and syncope.        Objective     /79   Pulse 92   Ht 170.2 cm (67\")   Wt 67.1 kg (148 lb)   BMI 23.18 kg/m²       Physical Exam    General : Alert, awake, no acute distress  Neck : Supple, no carotid bruit, no jugular venous distention  CVS : Irregularly irregular, 2/6 systolic murmur present. rubs or gallops  Lungs: Clear to auscultation bilaterally, no crackles or rhonchi  Abdomen: Soft, nontender, bowel sounds heard in all 4 quadrants  Extremities: Warm, well-perfused, no pedal edema    Result Review :     The following data was reviewed by: Jermaine Powell MD on 04/17/2023:                        Data reviewed: Cardiology studies        Results for orders placed during the hospital encounter of 07/29/22    Adult Transthoracic Echo Complete W/ Cont if Necessary Per Protocol (With Agitated Saline)    Interpretation Summary  · Left ventricular ejection fraction appears to be 56 - 60%.  · Estimated right ventricular systolic pressure from tricuspid regurgitation is moderately elevated (45-55 mmHg).  · The right atrial cavity is mildly dilated.    There were no apparent intracardiac masses, vegetations or thrombi.                   Assessment and Plan        Diagnoses " and all orders for this visit:    1. Paroxysmal atrial fibrillation (Primary)  Assessment & Plan:  She is in persistent atrial fibrillation with controlled ventricular rates.  Denies any significant palpitations.  Recent labs showed stable electrolytes and hemoglobin.  Continue Eliquis for anticoagulation due to high [id number] 2 score including history of previous stroke.  Continue metoprolol for rate control.      2. Nonrheumatic mitral valve regurgitation  Assessment & Plan:  Moderate to severe mitral regurgitation per echocardiogram done last year.  Systolic murmur audible.  We will continue to monitor, repeat echocardiogram in 2024.      3. Chronic diastolic heart failure  Assessment & Plan:  She is clinically not volume overloaded and denies any recent weight gain.  Continue Lasix with potassium.      4. Mixed hyperlipidemia  Assessment & Plan:  She is currently taking rosuvastatin 20 mg nightly.  Labs done in February showed LDL of 76, which is near goal and significantly improved from previous levels ( > 200) .  Continue the current dose of rosuvastatin.              Follow Up     Return in about 6 months (around 10/17/2023) for Next scheduled follow up.    Patient was given instructions and counseling regarding her condition or for health maintenance advice. Please see specific information pulled into the AVS if appropriate.

## 2023-11-14 ENCOUNTER — OFFICE VISIT (OUTPATIENT)
Dept: CARDIOLOGY | Facility: CLINIC | Age: 78
End: 2023-11-14
Payer: MEDICARE

## 2023-11-14 VITALS
HEART RATE: 85 BPM | DIASTOLIC BLOOD PRESSURE: 95 MMHG | HEIGHT: 67 IN | WEIGHT: 143.8 LBS | SYSTOLIC BLOOD PRESSURE: 157 MMHG | BODY MASS INDEX: 22.57 KG/M2

## 2023-11-14 DIAGNOSIS — I50.32 CHRONIC DIASTOLIC HEART FAILURE: ICD-10-CM

## 2023-11-14 DIAGNOSIS — I48.11 LONGSTANDING PERSISTENT ATRIAL FIBRILLATION: Primary | ICD-10-CM

## 2023-11-14 DIAGNOSIS — I34.0 NONRHEUMATIC MITRAL VALVE REGURGITATION: ICD-10-CM

## 2023-11-14 DIAGNOSIS — E78.2 MIXED HYPERLIPIDEMIA: ICD-10-CM

## 2023-11-14 RX ORDER — ROSUVASTATIN CALCIUM 20 MG/1
TABLET, COATED ORAL
COMMUNITY
Start: 2023-10-26

## 2023-11-14 NOTE — PROGRESS NOTES
Chief Complaint  Follow-up    Subjective        History of Present Illness  Maria Del Rosario Colmenares presents to [facility] CARDIOLOGY   Ms. Colmenares is a 78-year-old female coming in for routine cardiac follow-up.  Cardiac wise she reports she is doing well, and does not have any complaints of palpitations, chest pains, shortness of breath lightheadedness or dizziness.  She does still occasionally have trouble coming up with a right work, and this has been ongoing since her previous stroke.  Compliant with all of her medications, denies any bleeding side effects from being on anticoagulation.        Past History:     (1) Paroxysmal atrial fibrillation. (2) Hypothyroidism. (3) History of perforated appendicitis, status post surgery in 2019. (4) chronic combined heart failure (5) acute stroke involving right MCA territory with expressive aphasia and memory problems.  Episode happened in the setting of holding anticoagulation for epidural injections.    Past Medical History:   Diagnosis Date    Arthritis     Chronic combined systolic (congestive) and diastolic (congestive) heart failure     Hypothyroidism     Mixed hyperlipidemia     Paroxysmal atrial fibrillation     SOB (shortness of breath)     Thyroid disorder        No Known Allergies     Past Surgical History:   Procedure Laterality Date    ABDOMINAL HYSTERECTOMY      APPENDECTOMY      COLONOSCOPY      HYSTERECTOMY          Social History  She  reports that she has quit smoking. She has never used smokeless tobacco. She reports current alcohol use. She reports that she does not use drugs.    Family History  Her family history includes Lung cancer in her mother and paternal grandmother; No Known Problems in her father.       Current Outpatient Medications on File Prior to Visit   Medication Sig    Aspirin Adult Low Dose 81 MG EC tablet     Eliquis 5 MG tablet tablet Take 1 tablet by mouth 2 (two) times a day.    furosemide (LASIX) 20 MG tablet      "metoprolol succinate XL (TOPROL-XL) 25 MG 24 hr tablet Take 1 tablet by mouth 2 (two) times a day.    potassium chloride 10 MEQ CR tablet     rosuvastatin (CRESTOR) 20 MG tablet     Synthroid 75 MCG tablet      No current facility-administered medications on file prior to visit.         Review of Systems   Respiratory: Negative for cough, shortness of breath and wheezing.    Cardiovascular: Negative for chest pain, palpitations and leg swelling.  Gastrointestinal: Negative for nausea and vomiting.  Neurological: Negative for dizziness and syncope.        Objective   Vitals:    11/14/23 1104   BP: 157/95   Pulse: 85   Weight: 65.2 kg (143 lb 12.8 oz)   Height: 170.2 cm (67\")         Physical Exam  General : Alert, awake, no acute distress  Neck : Supple, no carotid bruit, no jugular venous distention  CVS : Irregularly irregular, 2/6 systolic murmur present. rubs or gallops   Lungs: Clear to auscultation bilaterally, no crackles or rhonchi  Abdomen: Soft, nontender, bowel sounds active  Extremities: Warm, well-perfused, no pedal edema      Result Review     The following data was reviewed by AIDA Mullins  No results found for: \"PROBNP\"       Lab Results   Component Value Date    TSH 3.150 08/26/2019      Lab Results   Component Value Date    FREET4 1.3 08/26/2019      No results found for: \"DDIMERQUANT\"  Magnesium   Date Value Ref Range Status   07/30/2022 2.1 1.6 - 2.4 mg/dL Final      No results found for: \"DIGOXIN\"   No results found for: \"TROPONINT\"        Labs from PCPs office dated 2/14/2023 reviewed.    Results for orders placed during the hospital encounter of 07/29/22    Adult Transthoracic Echo Complete W/ Cont if Necessary Per Protocol (With Agitated Saline)    Interpretation Summary  · Left ventricular ejection fraction appears to be 56 - 60%.  · Estimated right ventricular systolic pressure from tricuspid regurgitation is moderately elevated (45-55 mmHg).  · The right atrial cavity is mildly " dilated.    There were no apparent intracardiac masses, vegetations or thrombi.             Assessment and Plan   Diagnoses and all orders for this visit:    1. Longstanding persistent atrial fibrillation (Primary)  Assessment & Plan:  She remains in atrial fibrillation with controlled ventricular response.  She does not have any significant complaints of palpitations.  Continue metoprolol for rate control.  Due to elevated [id number] score, and previous stroke, we will continue current Eliquis for anticoagulation.  She does not have any signs or symptoms of bleeding.      2. Nonrheumatic mitral valve regurgitation  Assessment & Plan:  Echocardiogram last year showed moderate to severe mitral regurgitation.  She has an audible systolic murmur present.  She does not have any worsening shortness of breath, or signs of decompensation to suggest worsening valvular lesion.  We will plan to repeat echocardiogram mid next year to monitor the severity.      3. Chronic diastolic heart failure  Assessment & Plan:  Clinically she does not have any volume overload.  Continue dose of furosemide along with potassium supplement.  Previous chemistry panel unremarkable, she will have updated labs with PCP in the upcoming months.      4. Mixed hyperlipidemia  Assessment & Plan:  Most recent lipids show she is essentially at goal with LDL of 76.  Continue current dose rosuvastatin 20 mg nightly.  She reports she will have repeat labs with PCP in the upcoming months.              Follow Up   Return in about 6 months (around 5/14/2024) for Next scheduled follow up, with Dr. Powell.    Patient was given instructions and counseling regarding her condition or for health maintenance advice. Please see specific information pulled into the AVS if appropriate.     Signed,  Mariela Vazquez, APRN  11/14/2023     Dictated Utilizing Dragon Dictation: Please note that portions of this note were completed with a voice recognition program.  Part of  this note may be an electronic transcription/translation of spoken language to printed text using the Dragon Dictation System.

## 2023-11-14 NOTE — LETTER
November 16, 2023     AIDA Greer  [address] [place] [address]    Patient: Maria Del Rosario Colmenares   YOB: 1945   Date of Visit: 11/14/2023       Dear AIDA Greer    Maria Del Rosario Colmenares was in my office today. Below is a copy of my note.    If you have questions, please do not hesitate to call me. I look forward to following Maria Del Rosario along with you.         Sincerely,        AIDA Mullins        CC: No Recipients    Chief Complaint  Follow-up    Subjective       History of Present Illness  Maria Del Rosario Colmenares presents to [facility] CARDIOLOGY   Ms. Colmenares is a 78-year-old female coming in for routine cardiac follow-up.  Cardiac wise she reports she is doing well, and does not have any complaints of palpitations, chest pains, shortness of breath lightheadedness or dizziness.  She does still occasionally have trouble coming up with a right work, and this has been ongoing since her previous stroke.  Compliant with all of her medications, denies any bleeding side effects from being on anticoagulation.        Past History:     (1) Paroxysmal atrial fibrillation. (2) Hypothyroidism. (3) History of perforated appendicitis, status post surgery in 2019. (4) chronic combined heart failure (5) acute stroke involving right MCA territory with expressive aphasia and memory problems.  Episode happened in the setting of holding anticoagulation for epidural injections.    Past Medical History:   Diagnosis Date   • Arthritis    • Chronic combined systolic (congestive) and diastolic (congestive) heart failure    • Hypothyroidism    • Mixed hyperlipidemia    • Paroxysmal atrial fibrillation    • SOB (shortness of breath)    • Thyroid disorder        No Known Allergies     Past Surgical History:   Procedure Laterality Date   • ABDOMINAL HYSTERECTOMY     • APPENDECTOMY     • COLONOSCOPY     • HYSTERECTOMY          Social History  She  reports that she has quit smoking.  "She has never used smokeless tobacco. She reports current alcohol use. She reports that she does not use drugs.    Family History  Her family history includes Lung cancer in her mother and paternal grandmother; No Known Problems in her father.       Current Outpatient Medications on File Prior to Visit   Medication Sig   • Aspirin Adult Low Dose 81 MG EC tablet    • Eliquis 5 MG tablet tablet Take 1 tablet by mouth 2 (two) times a day.   • furosemide (LASIX) 20 MG tablet    • metoprolol succinate XL (TOPROL-XL) 25 MG 24 hr tablet Take 1 tablet by mouth 2 (two) times a day.   • potassium chloride 10 MEQ CR tablet    • rosuvastatin (CRESTOR) 20 MG tablet    • Synthroid 75 MCG tablet      No current facility-administered medications on file prior to visit.         Review of Systems   Respiratory: Negative for cough, shortness of breath and wheezing.    Cardiovascular: Negative for chest pain, palpitations and leg swelling.  Gastrointestinal: Negative for nausea and vomiting.  Neurological: Negative for dizziness and syncope.        Objective  Vitals:    11/14/23 1104   BP: 157/95   Pulse: 85   Weight: 65.2 kg (143 lb 12.8 oz)   Height: 170.2 cm (67\")         Physical Exam  General : Alert, awake, no acute distress  Neck : Supple, no carotid bruit, no jugular venous distention  CVS : Irregularly irregular, 2/6 systolic murmur present. rubs or gallops   Lungs: Clear to auscultation bilaterally, no crackles or rhonchi  Abdomen: Soft, nontender, bowel sounds active  Extremities: Warm, well-perfused, no pedal edema      Result Review    The following data was reviewed by Mariela Vazquez, AIDA  No results found for: \"PROBNP\"       Lab Results   Component Value Date    TSH 3.150 08/26/2019      Lab Results   Component Value Date    FREET4 1.3 08/26/2019      No results found for: \"DDIMERQUANT\"  Magnesium   Date Value Ref Range Status   07/30/2022 2.1 1.6 - 2.4 mg/dL Final      No results found for: \"DIGOXIN\"   No results found " "for: \"TROPONINT\"        Labs from PCPs office dated 2/14/2023 reviewed.    Results for orders placed during the hospital encounter of 07/29/22    Adult Transthoracic Echo Complete W/ Cont if Necessary Per Protocol (With Agitated Saline)    Interpretation Summary  · Left ventricular ejection fraction appears to be 56 - 60%.  · Estimated right ventricular systolic pressure from tricuspid regurgitation is moderately elevated (45-55 mmHg).  · The right atrial cavity is mildly dilated.    There were no apparent intracardiac masses, vegetations or thrombi.             Assessment and Plan   Diagnoses and all orders for this visit:    1. Longstanding persistent atrial fibrillation (Primary)  Assessment & Plan:  She remains in atrial fibrillation with controlled ventricular response.  She does not have any significant complaints of palpitations.  Continue metoprolol for rate control.  Due to elevated [id number] score, and previous stroke, we will continue current Eliquis for anticoagulation.  She does not have any signs or symptoms of bleeding.      2. Nonrheumatic mitral valve regurgitation  Assessment & Plan:  Echocardiogram last year showed moderate to severe mitral regurgitation.  She has an audible systolic murmur present.  She does not have any worsening shortness of breath, or signs of decompensation to suggest worsening valvular lesion.  We will plan to repeat echocardiogram mid next year to monitor the severity.      3. Chronic diastolic heart failure  Assessment & Plan:  Clinically she does not have any volume overload.  Continue dose of furosemide along with potassium supplement.  Previous chemistry panel unremarkable, she will have updated labs with PCP in the upcoming months.      4. Mixed hyperlipidemia  Assessment & Plan:  Most recent lipids show she is essentially at goal with LDL of 76.  Continue current dose rosuvastatin 20 mg nightly.  She reports she will have repeat labs with PCP in the upcoming " months.              Follow Up   Return in about 6 months (around 5/14/2024) for Next scheduled follow up, with Dr. Powell.    Patient was given instructions and counseling regarding her condition or for health maintenance advice. Please see specific information pulled into the AVS if appropriate.     Signed,  Mariela Vazquez, APRN  11/14/2023     Dictated Utilizing Dragon Dictation: Please note that portions of this note were completed with a voice recognition program.  Part of this note may be an electronic transcription/translation of spoken language to printed text using the Dragon Dictation System.

## 2023-11-16 PROBLEM — I48.11 LONGSTANDING PERSISTENT ATRIAL FIBRILLATION: Status: ACTIVE | Noted: 2022-03-31

## 2023-11-17 NOTE — ASSESSMENT & PLAN NOTE
Echocardiogram last year showed moderate to severe mitral regurgitation.  She has an audible systolic murmur present.  She does not have any worsening shortness of breath, or signs of decompensation to suggest worsening valvular lesion.  We will plan to repeat echocardiogram mid next year to monitor the severity.

## 2023-11-17 NOTE — ASSESSMENT & PLAN NOTE
She remains in atrial fibrillation with controlled ventricular response.  She does not have any significant complaints of palpitations.  Continue metoprolol for rate control.  Due to elevated [id number] score, and previous stroke, we will continue current Eliquis for anticoagulation.  She does not have any signs or symptoms of bleeding.

## 2023-11-17 NOTE — ASSESSMENT & PLAN NOTE
Most recent lipids show she is essentially at goal with LDL of 76.  Continue current dose rosuvastatin 20 mg nightly.  She reports she will have repeat labs with PCP in the upcoming months.

## 2023-11-17 NOTE — ASSESSMENT & PLAN NOTE
Clinically she does not have any volume overload.  Continue dose of furosemide along with potassium supplement.  Previous chemistry panel unremarkable, she will have updated labs with PCP in the upcoming months.

## 2024-02-04 ENCOUNTER — HOSPITAL ENCOUNTER (INPATIENT)
Facility: HOSPITAL | Age: 79
LOS: 4 days | Discharge: NURSING, REHAB OR LONG-TERM CARE | End: 2024-02-08
Attending: EMERGENCY MEDICINE | Admitting: HOSPITALIST
Payer: MEDICARE

## 2024-02-04 ENCOUNTER — APPOINTMENT (OUTPATIENT)
Dept: GENERAL RADIOLOGY | Facility: HOSPITAL | Age: 79
End: 2024-02-04
Payer: MEDICARE

## 2024-02-04 ENCOUNTER — APPOINTMENT (OUTPATIENT)
Dept: CT IMAGING | Facility: HOSPITAL | Age: 79
End: 2024-02-04
Payer: MEDICARE

## 2024-02-04 DIAGNOSIS — R33.8 ACUTE URINARY RETENTION: ICD-10-CM

## 2024-02-04 DIAGNOSIS — T14.8XXA FRACTURE: Primary | ICD-10-CM

## 2024-02-04 DIAGNOSIS — S72.141A CLOSED 2-PART INTERTROCHANTERIC FRACTURE OF RIGHT FEMUR, INITIAL ENCOUNTER: ICD-10-CM

## 2024-02-04 DIAGNOSIS — Z78.9 DECREASED ACTIVITIES OF DAILY LIVING (ADL): ICD-10-CM

## 2024-02-04 DIAGNOSIS — R26.2 DIFFICULTY WALKING: ICD-10-CM

## 2024-02-04 LAB
ALBUMIN SERPL-MCNC: 4.1 G/DL (ref 3.5–5.2)
ALBUMIN/GLOB SERPL: 1.6 G/DL
ALP SERPL-CCNC: 86 U/L (ref 39–117)
ALT SERPL W P-5'-P-CCNC: 34 U/L (ref 1–33)
ANION GAP SERPL CALCULATED.3IONS-SCNC: 14.6 MMOL/L (ref 5–15)
AST SERPL-CCNC: 61 U/L (ref 1–32)
BASOPHILS # BLD AUTO: 0.02 10*3/MM3 (ref 0–0.2)
BASOPHILS NFR BLD AUTO: 0.1 % (ref 0–1.5)
BILIRUB SERPL-MCNC: 0.9 MG/DL (ref 0–1.2)
BUN SERPL-MCNC: 24 MG/DL (ref 8–23)
BUN/CREAT SERPL: 22.6 (ref 7–25)
CALCIUM SPEC-SCNC: 9.3 MG/DL (ref 8.6–10.5)
CHLORIDE SERPL-SCNC: 100 MMOL/L (ref 98–107)
CK SERPL-CCNC: 2635 U/L (ref 20–180)
CO2 SERPL-SCNC: 24.4 MMOL/L (ref 22–29)
CREAT SERPL-MCNC: 1.06 MG/DL (ref 0.57–1)
DEPRECATED RDW RBC AUTO: 43.3 FL (ref 37–54)
EGFRCR SERPLBLD CKD-EPI 2021: 53.9 ML/MIN/1.73
EOSINOPHIL # BLD AUTO: 0 10*3/MM3 (ref 0–0.4)
EOSINOPHIL NFR BLD AUTO: 0 % (ref 0.3–6.2)
ERYTHROCYTE [DISTWIDTH] IN BLOOD BY AUTOMATED COUNT: 13.2 % (ref 12.3–15.4)
GEN 5 2HR TROPONIN T REFLEX: 73 NG/L
GLOBULIN UR ELPH-MCNC: 2.6 GM/DL
GLUCOSE BLDC GLUCOMTR-MCNC: 159 MG/DL (ref 70–99)
GLUCOSE BLDC GLUCOMTR-MCNC: 181 MG/DL (ref 70–99)
GLUCOSE SERPL-MCNC: 256 MG/DL (ref 65–99)
HCT VFR BLD AUTO: 36.6 % (ref 34–46.6)
HGB BLD-MCNC: 12.1 G/DL (ref 12–15.9)
HOLD SPECIMEN: NORMAL
HOLD SPECIMEN: NORMAL
IMM GRANULOCYTES # BLD AUTO: 0.07 10*3/MM3 (ref 0–0.05)
IMM GRANULOCYTES NFR BLD AUTO: 0.5 % (ref 0–0.5)
LIPASE SERPL-CCNC: 9 U/L (ref 13–60)
LYMPHOCYTES # BLD AUTO: 0.94 10*3/MM3 (ref 0.7–3.1)
LYMPHOCYTES NFR BLD AUTO: 6.6 % (ref 19.6–45.3)
MAGNESIUM SERPL-MCNC: 2 MG/DL (ref 1.6–2.4)
MCH RBC QN AUTO: 30 PG (ref 26.6–33)
MCHC RBC AUTO-ENTMCNC: 33.1 G/DL (ref 31.5–35.7)
MCV RBC AUTO: 90.8 FL (ref 79–97)
MONOCYTES # BLD AUTO: 1.07 10*3/MM3 (ref 0.1–0.9)
MONOCYTES NFR BLD AUTO: 7.5 % (ref 5–12)
NEUTROPHILS NFR BLD AUTO: 12.23 10*3/MM3 (ref 1.7–7)
NEUTROPHILS NFR BLD AUTO: 85.3 % (ref 42.7–76)
NRBC BLD AUTO-RTO: 0 /100 WBC (ref 0–0.2)
NT-PROBNP SERPL-MCNC: ABNORMAL PG/ML (ref 0–1800)
PLATELET # BLD AUTO: 196 10*3/MM3 (ref 140–450)
PMV BLD AUTO: 10.6 FL (ref 6–12)
POTASSIUM SERPL-SCNC: 3.8 MMOL/L (ref 3.5–5.2)
PROT SERPL-MCNC: 6.7 G/DL (ref 6–8.5)
RBC # BLD AUTO: 4.03 10*6/MM3 (ref 3.77–5.28)
SODIUM SERPL-SCNC: 139 MMOL/L (ref 136–145)
TROPONIN T DELTA: -9 NG/L
TROPONIN T SERPL HS-MCNC: 82 NG/L
WBC NRBC COR # BLD AUTO: 14.33 10*3/MM3 (ref 3.4–10.8)
WHOLE BLOOD HOLD COAG: NORMAL
WHOLE BLOOD HOLD SPECIMEN: NORMAL

## 2024-02-04 PROCEDURE — 85025 COMPLETE CBC W/AUTO DIFF WBC: CPT | Performed by: EMERGENCY MEDICINE

## 2024-02-04 PROCEDURE — 82948 REAGENT STRIP/BLOOD GLUCOSE: CPT | Performed by: HOSPITALIST

## 2024-02-04 PROCEDURE — 93005 ELECTROCARDIOGRAM TRACING: CPT

## 2024-02-04 PROCEDURE — 99285 EMERGENCY DEPT VISIT HI MDM: CPT

## 2024-02-04 PROCEDURE — 93005 ELECTROCARDIOGRAM TRACING: CPT | Performed by: EMERGENCY MEDICINE

## 2024-02-04 PROCEDURE — 70450 CT HEAD/BRAIN W/O DYE: CPT

## 2024-02-04 PROCEDURE — 93010 ELECTROCARDIOGRAM REPORT: CPT | Performed by: INTERNAL MEDICINE

## 2024-02-04 PROCEDURE — 82550 ASSAY OF CK (CPK): CPT | Performed by: HOSPITALIST

## 2024-02-04 PROCEDURE — 83690 ASSAY OF LIPASE: CPT | Performed by: EMERGENCY MEDICINE

## 2024-02-04 PROCEDURE — 80053 COMPREHEN METABOLIC PANEL: CPT | Performed by: EMERGENCY MEDICINE

## 2024-02-04 PROCEDURE — 63710000001 INSULIN REGULAR HUMAN PER 5 UNITS: Performed by: HOSPITALIST

## 2024-02-04 PROCEDURE — 73030 X-RAY EXAM OF SHOULDER: CPT

## 2024-02-04 PROCEDURE — 83880 ASSAY OF NATRIURETIC PEPTIDE: CPT | Performed by: EMERGENCY MEDICINE

## 2024-02-04 PROCEDURE — 36415 COLL VENOUS BLD VENIPUNCTURE: CPT

## 2024-02-04 PROCEDURE — 71045 X-RAY EXAM CHEST 1 VIEW: CPT

## 2024-02-04 PROCEDURE — 82948 REAGENT STRIP/BLOOD GLUCOSE: CPT

## 2024-02-04 PROCEDURE — 83735 ASSAY OF MAGNESIUM: CPT | Performed by: EMERGENCY MEDICINE

## 2024-02-04 PROCEDURE — 84484 ASSAY OF TROPONIN QUANT: CPT | Performed by: EMERGENCY MEDICINE

## 2024-02-04 PROCEDURE — 25810000003 SODIUM CHLORIDE 0.9 % SOLUTION: Performed by: HOSPITALIST

## 2024-02-04 PROCEDURE — 73521 X-RAY EXAM HIPS BI 2 VIEWS: CPT

## 2024-02-04 RX ORDER — POTASSIUM CHLORIDE 750 MG/1
40 CAPSULE, EXTENDED RELEASE ORAL ONCE
Status: COMPLETED | OUTPATIENT
Start: 2024-02-04 | End: 2024-02-04

## 2024-02-04 RX ORDER — NALOXONE HCL 0.4 MG/ML
0.4 VIAL (ML) INJECTION
Status: DISCONTINUED | OUTPATIENT
Start: 2024-02-04 | End: 2024-02-04

## 2024-02-04 RX ORDER — BISACODYL 5 MG/1
5 TABLET, DELAYED RELEASE ORAL DAILY PRN
Status: DISCONTINUED | OUTPATIENT
Start: 2024-02-04 | End: 2024-02-08 | Stop reason: HOSPADM

## 2024-02-04 RX ORDER — FUROSEMIDE 20 MG/1
20 TABLET ORAL DAILY
Status: DISCONTINUED | OUTPATIENT
Start: 2024-02-04 | End: 2024-02-08 | Stop reason: HOSPADM

## 2024-02-04 RX ORDER — ONDANSETRON 2 MG/ML
4 INJECTION INTRAMUSCULAR; INTRAVENOUS EVERY 6 HOURS PRN
Status: DISCONTINUED | OUTPATIENT
Start: 2024-02-04 | End: 2024-02-08 | Stop reason: HOSPADM

## 2024-02-04 RX ORDER — HYDROCODONE BITARTRATE AND ACETAMINOPHEN 7.5; 325 MG/1; MG/1
2 TABLET ORAL EVERY 4 HOURS PRN
Status: DISCONTINUED | OUTPATIENT
Start: 2024-02-04 | End: 2024-02-05 | Stop reason: SDUPTHER

## 2024-02-04 RX ORDER — ONDANSETRON 2 MG/ML
4 INJECTION INTRAMUSCULAR; INTRAVENOUS EVERY 6 HOURS PRN
Status: DISCONTINUED | OUTPATIENT
Start: 2024-02-04 | End: 2024-02-04

## 2024-02-04 RX ORDER — ONDANSETRON 4 MG/1
4 TABLET, ORALLY DISINTEGRATING ORAL EVERY 6 HOURS PRN
Status: DISCONTINUED | OUTPATIENT
Start: 2024-02-04 | End: 2024-02-04

## 2024-02-04 RX ORDER — NALOXONE HCL 0.4 MG/ML
0.4 VIAL (ML) INJECTION
Status: DISCONTINUED | OUTPATIENT
Start: 2024-02-04 | End: 2024-02-08 | Stop reason: HOSPADM

## 2024-02-04 RX ORDER — SODIUM CHLORIDE 0.9 % (FLUSH) 0.9 %
10 SYRINGE (ML) INJECTION AS NEEDED
Status: DISCONTINUED | OUTPATIENT
Start: 2024-02-04 | End: 2024-02-08 | Stop reason: HOSPADM

## 2024-02-04 RX ORDER — LEVOTHYROXINE SODIUM 0.07 MG/1
75 TABLET ORAL
Status: DISCONTINUED | OUTPATIENT
Start: 2024-02-05 | End: 2024-02-08 | Stop reason: HOSPADM

## 2024-02-04 RX ORDER — ROSUVASTATIN CALCIUM 20 MG/1
20 TABLET, COATED ORAL NIGHTLY
Status: DISCONTINUED | OUTPATIENT
Start: 2024-02-04 | End: 2024-02-08 | Stop reason: HOSPADM

## 2024-02-04 RX ORDER — BISACODYL 10 MG
10 SUPPOSITORY, RECTAL RECTAL DAILY PRN
Status: DISCONTINUED | OUTPATIENT
Start: 2024-02-04 | End: 2024-02-08 | Stop reason: HOSPADM

## 2024-02-04 RX ORDER — ASPIRIN 81 MG/1
81 TABLET ORAL DAILY
Status: DISCONTINUED | OUTPATIENT
Start: 2024-02-04 | End: 2024-02-06

## 2024-02-04 RX ORDER — ACETAMINOPHEN 325 MG/1
650 TABLET ORAL EVERY 4 HOURS PRN
Status: DISCONTINUED | OUTPATIENT
Start: 2024-02-04 | End: 2024-02-08 | Stop reason: HOSPADM

## 2024-02-04 RX ORDER — DILTIAZEM HYDROCHLORIDE 5 MG/ML
10 INJECTION INTRAVENOUS ONCE
Status: COMPLETED | OUTPATIENT
Start: 2024-02-04 | End: 2024-02-05

## 2024-02-04 RX ORDER — ACETAMINOPHEN 650 MG/1
650 SUPPOSITORY RECTAL EVERY 4 HOURS PRN
Status: DISCONTINUED | OUTPATIENT
Start: 2024-02-04 | End: 2024-02-08 | Stop reason: HOSPADM

## 2024-02-04 RX ORDER — AMOXICILLIN 250 MG
2 CAPSULE ORAL 2 TIMES DAILY
Status: DISCONTINUED | OUTPATIENT
Start: 2024-02-04 | End: 2024-02-08 | Stop reason: HOSPADM

## 2024-02-04 RX ORDER — ONDANSETRON 4 MG/1
4 TABLET, ORALLY DISINTEGRATING ORAL EVERY 6 HOURS PRN
Status: DISCONTINUED | OUTPATIENT
Start: 2024-02-04 | End: 2024-02-08 | Stop reason: HOSPADM

## 2024-02-04 RX ORDER — IBUPROFEN 600 MG/1
1 TABLET ORAL
Status: DISCONTINUED | OUTPATIENT
Start: 2024-02-04 | End: 2024-02-08 | Stop reason: HOSPADM

## 2024-02-04 RX ORDER — SODIUM CHLORIDE 9 MG/ML
40 INJECTION, SOLUTION INTRAVENOUS AS NEEDED
Status: DISCONTINUED | OUTPATIENT
Start: 2024-02-04 | End: 2024-02-08 | Stop reason: HOSPADM

## 2024-02-04 RX ORDER — MORPHINE SULFATE 2 MG/ML
1 INJECTION, SOLUTION INTRAMUSCULAR; INTRAVENOUS EVERY 4 HOURS PRN
Status: DISCONTINUED | OUTPATIENT
Start: 2024-02-04 | End: 2024-02-08 | Stop reason: HOSPADM

## 2024-02-04 RX ORDER — METOPROLOL SUCCINATE 25 MG/1
25 TABLET, EXTENDED RELEASE ORAL EVERY 12 HOURS SCHEDULED
Status: DISCONTINUED | OUTPATIENT
Start: 2024-02-04 | End: 2024-02-06

## 2024-02-04 RX ORDER — POLYETHYLENE GLYCOL 3350 17 G/17G
17 POWDER, FOR SOLUTION ORAL DAILY PRN
Status: DISCONTINUED | OUTPATIENT
Start: 2024-02-04 | End: 2024-02-08 | Stop reason: HOSPADM

## 2024-02-04 RX ORDER — CHOLECALCIFEROL (VITAMIN D3) 125 MCG
5 CAPSULE ORAL NIGHTLY PRN
Status: DISCONTINUED | OUTPATIENT
Start: 2024-02-04 | End: 2024-02-08 | Stop reason: HOSPADM

## 2024-02-04 RX ORDER — ASPIRIN 81 MG/1
324 TABLET, CHEWABLE ORAL ONCE
Status: DISCONTINUED | OUTPATIENT
Start: 2024-02-04 | End: 2024-02-06

## 2024-02-04 RX ORDER — SODIUM CHLORIDE 0.9 % (FLUSH) 0.9 %
10 SYRINGE (ML) INJECTION EVERY 12 HOURS SCHEDULED
Status: DISCONTINUED | OUTPATIENT
Start: 2024-02-04 | End: 2024-02-08 | Stop reason: HOSPADM

## 2024-02-04 RX ORDER — CEFAZOLIN SODIUM 2 G/100ML
2000 INJECTION, SOLUTION INTRAVENOUS
Status: DISCONTINUED | OUTPATIENT
Start: 2024-02-05 | End: 2024-02-05 | Stop reason: HOSPADM

## 2024-02-04 RX ORDER — NICOTINE POLACRILEX 4 MG
15 LOZENGE BUCCAL
Status: DISCONTINUED | OUTPATIENT
Start: 2024-02-04 | End: 2024-02-08 | Stop reason: HOSPADM

## 2024-02-04 RX ORDER — DEXTROSE MONOHYDRATE 25 G/50ML
25 INJECTION, SOLUTION INTRAVENOUS
Status: DISCONTINUED | OUTPATIENT
Start: 2024-02-04 | End: 2024-02-08 | Stop reason: HOSPADM

## 2024-02-04 RX ORDER — HYDROCODONE BITARTRATE AND ACETAMINOPHEN 5; 325 MG/1; MG/1
1 TABLET ORAL EVERY 4 HOURS PRN
Status: DISCONTINUED | OUTPATIENT
Start: 2024-02-04 | End: 2024-02-05 | Stop reason: SDUPTHER

## 2024-02-04 RX ORDER — ACETAMINOPHEN 160 MG/5ML
650 SOLUTION ORAL EVERY 4 HOURS PRN
Status: DISCONTINUED | OUTPATIENT
Start: 2024-02-04 | End: 2024-02-08 | Stop reason: HOSPADM

## 2024-02-04 RX ADMIN — POTASSIUM CHLORIDE 40 MEQ: 10 CAPSULE, COATED, EXTENDED RELEASE ORAL at 17:15

## 2024-02-04 RX ADMIN — METOPROLOL SUCCINATE 25 MG: 25 TABLET, EXTENDED RELEASE ORAL at 20:27

## 2024-02-04 RX ADMIN — METOPROLOL TARTRATE 10 MG: 1 INJECTION, SOLUTION INTRAVENOUS at 15:41

## 2024-02-04 RX ADMIN — ROSUVASTATIN CALCIUM 20 MG: 20 TABLET, FILM COATED ORAL at 20:27

## 2024-02-04 RX ADMIN — Medication 10 ML: at 20:27

## 2024-02-04 RX ADMIN — SODIUM CHLORIDE 500 ML: 9 INJECTION, SOLUTION INTRAVENOUS at 17:20

## 2024-02-04 RX ADMIN — DOCUSATE SODIUM 50MG AND SENNOSIDES 8.6MG 2 TABLET: 8.6; 5 TABLET, FILM COATED ORAL at 20:27

## 2024-02-04 RX ADMIN — INSULIN HUMAN 2 UNITS: 100 INJECTION, SOLUTION PARENTERAL at 23:49

## 2024-02-04 RX ADMIN — HYDROCODONE BITARTRATE AND ACETAMINOPHEN 1 TABLET: 5; 325 TABLET ORAL at 23:39

## 2024-02-04 NOTE — H&P
[facility]  Date: [facility]   Patient Name: Maria Del Rosario Colmenares  : 1945  MRN: [id number]  Primary Care Physician:  Annmarie David APRN  Date of admission: 2024    Subjective fall with lower extremity and left shoulder pain  Subjective     Chief Complaint: Follow-up with lower extremity and left shoulder pain    HPI: Patient is a 78-year-old female who presents to the ED for evaluation of bilateral hip and left shoulder pain following mechanical fall earlier today.  Patient was down for unknown period of time. She denies hitting her head.  Patient does not recall how she fell.    On arrival to the ED, patient's temperature 99.3, pulse of 128, respiratory rate of 26, blood pressure 142/106, and she saturating 92% on room air.    Chest x-ray is clear.  Left shoulder shows mild soft tissue swelling with no definite acute osseous abnormality.  X-ray of both hips with and without pelvis shows:  intertrochanteric fracture proximal right femur.  No definitive acute abnormality on limited imaging of the left hip.    Patient's initial high-sensitivity troponin was 82.  proBNP is 15,823.  Creatinine is 1.06.  Sodium is 13 9.  Glucose is 256.  White blood cell count is 14.33.  AST is 61; ALT is 34.    Personal History     Past Medical History:  Past Medical History:   Diagnosis Date    Arthritis     Chronic combined systolic (congestive) and diastolic (congestive) heart failure     Hypothyroidism     Mixed hyperlipidemia     Paroxysmal atrial fibrillation     SOB (shortness of breath)     Thyroid disorder        Past Surgical History:  Past Surgical History:   Procedure Laterality Date    ABDOMINAL HYSTERECTOMY      APPENDECTOMY      COLONOSCOPY      HYSTERECTOMY         Family History:   Family History   Problem Relation Age of Onset    Lung cancer Mother     No Known Problems Father     Lung cancer Paternal Grandmother        Social History:   Social History      Socioeconomic History    Marital status:    Tobacco Use    Smoking status: Former    Smokeless tobacco: Never   Vaping Use    Vaping Use: Never used   Substance and Sexual Activity    Alcohol use: Yes     Comment: CURRENT SOME DAY    Drug use: Never    Sexual activity: Defer       Home Medications:  apixaban, aspirin, furosemide, levothyroxine, metoprolol succinate XL, potassium chloride, and rosuvastatin    Allergies:  No Known Allergies    Review of Systems   All systems were reviewed and negative except for: Pain on her left side shoulder and hip    Objective   Objective     Vitals:   Temp:  [99.3 °F (37.4 °C)] 99.3 °F (37.4 °C)  Heart Rate:  [] 95  Resp:  [26] 26  BP: (104-156)/() 104/82    Physical Exam    Constitutional: Awake, alert, no acute distress   Eyes: Pupils equal, sclerae anicteric, no conjunctival injection   HENT: NCAT, mucous membranes moist   Neck: Supple, no thyromegaly, no lymphadenopathy, trachea midline   Respiratory: Clear to auscultation bilaterally, nonlabored respirations    Cardiovascular: RRR, no murmurs, rubs, or gallops, palpable pedal pulses bilaterally   Gastrointestinal: Positive bowel sounds, soft, nontender, nondistended   Musculoskeletal: No bilateral ankle edema, no clubbing or cyanosis to extremities   Psychiatric: Appropriate affect, cooperative   Neurologic: Oriented x 3, strength symmetric in all extremities, Cranial Nerves grossly intact to confrontation, speech clear   Skin: No rashes     Result Review    Result Review:  I have personally reviewed the results from the time of this admission to 2/4/2024 16:33 EST and agree with these findings:  [x]  Laboratory  []  Microbiology  [x]  Radiology  []  EKG/Telemetry   []  Cardiology/Vascular   []  Pathology  [x]  Old records  []  Other:      Assessment & Plan   Assessment / Plan   #1 right intertrochanteric fracture/left shoulder contusion  -Orthopedic surgery consulted  -Hold Eliquis until after the  surgery.  -Pain control  -PT/OT/case management for discharge planning    #2 mechanical fall  -PT/OT    #3 paroxysmal A-fib  -10mg  of IV Lopressor X1 ; continue home metoprolol; keep K at 4, mag at 2.  -Holding Eliquis for procedure  -Follows with Dr. Powell outpatient.     #4 nonrheumatic mitral valve regurgitation    #5 chronic diastolic CHF  -Patient on goal-directed care.  Most recent echo shows an EF of 60%.  Patient on Lasix, beta-blocker.    #6 SHIVA-gentle hydration  -Will check for CK.  Hold Lasix for tonight.    #7 mixed hyperlipidemia  -Continue Crestor.    #8 hyperglycemia  -Will cover with insulin sliding scale    #9 hypothyroidism-continue home Synthroid    #10 NSTEMI most likely type II.  -Will trend troponin.  Patient has SHIVA, and was in A-fib with RVR.    DVT prophylaxis:  Medical and mechanical DVT prophylaxis orders are present.  Eliquis being held for the surgery.        CODE STATUS:    Level Of Support Discussed With: Patient  Code Status (Patient has no pulse and is not breathing): CPR (Attempt to Resuscitate)  Medical Interventions (Patient has pulse or is breathing): Full Support      Admission Status:  I believe this patient meets inpatient status.    Electronically signed by Grayson Gilbert DO, 02/04/24, 2:44 PM EST.

## 2024-02-04 NOTE — ED PROVIDER NOTES
Time: 1:29 PM EST  Date of encounter:  2/4/2024  Independent Historian/Clinical History and Information was obtained by:   Patient    History is limited by: N/A    Chief Complaint: Fall with lower extremity and left shoulder pain.      History of Present Illness:  Patient is a 78 y.o. year old female who presents to the emergency department for evaluation of bilateral hip pain and left shoulder pain following mechanical fall earlier today.  She denies having struck her head.  She denies additional injuries.    HPI    Patient Care Team  Primary Care Provider: Annmarie David APRN    Past Medical History:     No Known Allergies  Past Medical History:   Diagnosis Date    Arthritis     Chronic combined systolic (congestive) and diastolic (congestive) heart failure     Hypothyroidism     Mixed hyperlipidemia     Paroxysmal atrial fibrillation     SOB (shortness of breath)     Thyroid disorder      Past Surgical History:   Procedure Laterality Date    ABDOMINAL HYSTERECTOMY      APPENDECTOMY      COLONOSCOPY      HYSTERECTOMY       Family History   Problem Relation Age of Onset    Lung cancer Mother     No Known Problems Father     Lung cancer Paternal Grandmother        Home Medications:  Prior to Admission medications    Medication Sig Start Date End Date Taking? Authorizing Provider   Aspirin Adult Low Dose 81 MG EC tablet  8/22/22   Jesus Prakash MD   Eliquis 5 MG tablet tablet Take 1 tablet by mouth 2 (two) times a day. 5/28/21   Jesus Prakash MD   furosemide (LASIX) 20 MG tablet  2/1/23   Jesus Prakash MD   metoprolol succinate XL (TOPROL-XL) 25 MG 24 hr tablet Take 1 tablet by mouth 2 (two) times a day.    Jesus Prakash MD   potassium chloride 10 MEQ CR tablet  2/1/23   Jesus Prakash MD   rosuvastatin (CRESTOR) 20 MG tablet  10/26/23   Jesus Prakash MD   Synthroid 75 MCG tablet  2/1/23   Jesus Prakash MD        Social History:   Social History      Tobacco Use    Smoking status: Former    Smokeless tobacco: Never   Vaping Use    Vaping Use: Never used   Substance Use Topics    Alcohol use: Yes     Comment: CURRENT SOME DAY    Drug use: Never         Review of Systems:  Review of Systems   Constitutional:  Negative for chills and fever.   HENT:  Negative for congestion, ear pain and sore throat.    Eyes:  Negative for pain.   Respiratory:  Negative for cough, chest tightness and shortness of breath.    Cardiovascular:  Negative for chest pain.   Gastrointestinal:  Negative for abdominal pain, diarrhea, nausea and vomiting.   Genitourinary:  Negative for flank pain and hematuria.   Musculoskeletal:  Negative for joint swelling.   Skin:  Negative for pallor.   Neurological:  Negative for seizures and headaches.   All other systems reviewed and are negative.       Physical Exam:  /96   Pulse (!) 127   Temp 99.3 °F (37.4 °C) (Oral)   Resp 26   Wt 69.1 kg (152 lb 5.4 oz)   SpO2 92%   BMI 23.86 kg/m²     Physical Exam  Constitutional:       Appearance: Normal appearance.   HENT:      Head: Normocephalic and atraumatic.      Nose: Nose normal.      Mouth/Throat:      Mouth: Mucous membranes are moist.   Eyes:      Extraocular Movements: Extraocular movements intact.      Conjunctiva/sclera: Conjunctivae normal.      Pupils: Pupils are equal, round, and reactive to light.   Cardiovascular:      Rate and Rhythm: Normal rate and regular rhythm.      Pulses: Normal pulses.      Heart sounds: Normal heart sounds.   Pulmonary:      Effort: Pulmonary effort is normal.      Breath sounds: Normal breath sounds. No wheezing.   Abdominal:      Palpations: Abdomen is soft.      Tenderness: There is no abdominal tenderness.   Musculoskeletal:         General: Normal range of motion.      Cervical back: Normal range of motion and neck supple.      Right lower leg: No edema.      Left lower leg: No edema.   Skin:     General: Skin is warm and dry.      Capillary  Refill: Capillary refill takes less than 2 seconds.      Findings: No rash.   Neurological:      General: No focal deficit present.      Mental Status: She is alert and oriented to person, place, and time. Mental status is at baseline.      Cranial Nerves: No cranial nerve deficit.      Sensory: No sensory deficit.      Motor: No weakness.   Psychiatric:         Mood and Affect: Mood normal.         Behavior: Behavior normal.                  Procedures:  Procedures      Medical Decision Making:      Comorbidities that affect care:    Atrial Fibrillation    External Notes reviewed:    None      The following orders were placed and all results were independently analyzed by me:  Orders Placed This Encounter   Procedures    XR Chest 1 View    XR Shoulder 2+ View Left    XR Hips Bilateral With or Without Pelvis 2 View    CT Head Without Contrast    [place] Draw    High Sensitivity Troponin T    Comprehensive Metabolic Panel    Lipase    BNP    Magnesium    CBC Auto Differential    Urinalysis With Microscopic If Indicated (No Culture) - Urine, Clean Catch    High Sensitivity Troponin T 2Hr    NPO Diet NPO Type: Strict NPO    Undress & Gown    Continuous Pulse Oximetry    Vital Signs    Intake & Output    Weigh Patient    Oral Care    Saline Lock & Maintain IV Access    Place Sequential Compression Device    Maintain Sequential Compression Device    Discontinue Cardiac Monitoring    Hospitalist (on-call MD unless specified)    Orthopedics (on-call MD unless specified)    Oxygen Therapy- Nasal Cannula; Titrate 1-6 LPM Per SpO2; 90 - 95%    POC Glucose 4x Daily Before Meals & at Bedtime    ECG 12 Lead Rhythm Change    Insert Peripheral IV    Insert Peripheral IV    Inpatient Admission    CBC & Differential    Green Top (Gel)    Lavender Top    Gold Top - SST    Light Blue Top       Medications Given in the Emergency Department:  Medications   sodium chloride 0.9 % flush 10 mL (has no administration in time range)   aspirin  chewable tablet 324 mg (324 mg Oral Not Given 2/4/24 1513)   sodium chloride 0.9 % flush 10 mL (has no administration in time range)   sodium chloride 0.9 % flush 10 mL (has no administration in time range)   sodium chloride 0.9 % infusion 40 mL (has no administration in time range)   sennosides-docusate (PERICOLACE) 8.6-50 MG per tablet 2 tablet (has no administration in time range)     And   polyethylene glycol (MIRALAX) packet 17 g (has no administration in time range)     And   bisacodyl (DULCOLAX) EC tablet 5 mg (has no administration in time range)     And   bisacodyl (DULCOLAX) suppository 10 mg (has no administration in time range)   acetaminophen (TYLENOL) tablet 650 mg (has no administration in time range)     Or   acetaminophen (TYLENOL) 160 MG/5ML oral solution 650 mg (has no administration in time range)     Or   acetaminophen (TYLENOL) suppository 650 mg (has no administration in time range)   morphine injection 1 mg (has no administration in time range)     And   naloxone (NARCAN) injection 0.4 mg (has no administration in time range)   ondansetron ODT (ZOFRAN-ODT) disintegrating tablet 4 mg (has no administration in time range)     Or   ondansetron (ZOFRAN) injection 4 mg (has no administration in time range)   metoprolol succinate XL (TOPROL-XL) 24 hr tablet 25 mg (has no administration in time range)   rosuvastatin (CRESTOR) tablet 20 mg (has no administration in time range)   levothyroxine (SYNTHROID, LEVOTHROID) tablet 75 mcg ( Oral Dose Auto Held 2/13/24 0600)   dextrose (GLUTOSE) oral gel 15 g (has no administration in time range)   dextrose (D50W) (25 g/50 mL) IV injection 25 g (has no administration in time range)   glucagon (GLUCAGEN) injection 1 mg (has no administration in time range)   insulin regular (humuLIN R,novoLIN R) injection 2-7 Units (has no administration in time range)   metoprolol tartrate (LOPRESSOR) injection 10 mg (has no administration in time range)        ED  Course:    ED Course as of 02/04/24 1522   Sun Feb 04, 2024   1439 EG: Rate 135, abnormal P waves, atrial fibrillation, abnormal QRS, nonspecific ST segment changes, normal QT interval. [RW]      ED Course User Index  [RW] Rodney Silvestre MD       Labs:    Lab Results (last 24 hours)       Procedure Component Value Units Date/Time    High Sensitivity Troponin T [[id number]]  (Abnormal) Collected: 02/04/24 1334    Specimen: Blood Updated: 02/04/24 1432     HS Troponin T 82 ng/L     Narrative:      High Sensitive Troponin T Reference Range:  <14.0 ng/L- Negative Female for AMI  <22.0 ng/L- Negative Male for AMI  >=14 - Abnormal Female indicating possible myocardial injury.  >=22 - Abnormal Male indicating possible myocardial injury.   Clinicians would have to utilize clinical acumen, EKG, Troponin, and serial changes to determine if it is an Acute Myocardial Infarction or myocardial injury due to an underlying chronic condition.         CBC & Differential [[id number]]  (Abnormal) Collected: 02/04/24 1334    Specimen: Blood Updated: 02/04/24 1348    Narrative:      The following orders were created for panel order CBC & Differential.  Procedure                               Abnormality         Status                     ---------                               -----------         ------                     CBC Auto Differential[[id number]]        Abnormal            Final result                 Please view results for these tests on the individual orders.    Comprehensive Metabolic Panel [[id number]]  (Abnormal) Collected: 02/04/24 1334    Specimen: Blood Updated: 02/04/24 1413     Glucose 256 mg/dL      BUN 24 mg/dL      Creatinine 1.06 mg/dL      Sodium 139 mmol/L      Potassium 3.8 mmol/L      Chloride 100 mmol/L      CO2 24.4 mmol/L      Calcium 9.3 mg/dL      Total Protein 6.7 g/dL      Albumin 4.1 g/dL      ALT (SGPT) 34 U/L      AST (SGOT) 61 U/L      Alkaline Phosphatase 86 U/L      Total Bilirubin 0.9 mg/dL       Globulin 2.6 gm/dL      A/G Ratio 1.6 g/dL      BUN/Creatinine Ratio 22.6     Anion Gap 14.6 mmol/L      eGFR 53.9 mL/min/1.73     Narrative:      GFR Normal >60  Chronic Kidney Disease <60  Kidney Failure <15    The GFR formula is only valid for adults with stable renal function between ages 18 and 70.    Lipase [[id number]]  (Abnormal) Collected: 02/04/24 1334    Specimen: Blood Updated: 02/04/24 1413     Lipase 9 U/L     BNP [[id number]]  (Abnormal) Collected: 02/04/24 1334    Specimen: Blood Updated: 02/04/24 1419     proBNP 15,823.0 pg/mL     Narrative:      This assay is used as an aid in the diagnosis of individuals suspected of having heart failure. It can be used as an aid in the diagnosis of acute decompensated heart failure (ADHF) in patients presenting with signs and symptoms of ADHF to the emergency department (ED). In addition, NT-proBNP of <300 pg/mL indicates ADHF is not likely.    Age Range Result Interpretation  NT-proBNP Concentration (pg/mL:      <50             Positive            >450                   Gray                 300-450                    Negative             <300    50-75           Positive            >900                  Gray                300-900                  Negative            <300      >75             Positive            >1800                  Gray                300-1800                  Negative            <300    Magnesium [[id number]]  (Normal) Collected: 02/04/24 1334    Specimen: Blood Updated: 02/04/24 1413     Magnesium 2.0 mg/dL     CBC Auto Differential [[id number]]  (Abnormal) Collected: 02/04/24 1334    Specimen: Blood Updated: 02/04/24 1348     WBC 14.33 10*3/mm3      RBC 4.03 10*6/mm3      Hemoglobin 12.1 g/dL      Hematocrit 36.6 %      MCV 90.8 fL      MCH 30.0 pg      MCHC 33.1 g/dL      RDW 13.2 %      RDW-SD 43.3 fl      MPV 10.6 fL      Platelets 196 10*3/mm3      Neutrophil % 85.3 %      Lymphocyte % 6.6 %      Monocyte % 7.5 %      Eosinophil % 0.0 %       Basophil % 0.1 %      Immature Grans % 0.5 %      Neutrophils, Absolute 12.23 10*3/mm3      Lymphocytes, Absolute 0.94 10*3/mm3      Monocytes, Absolute 1.07 10*3/mm3      Eosinophils, Absolute 0.00 10*3/mm3      Basophils, Absolute 0.02 10*3/mm3      Immature Grans, Absolute 0.07 10*3/mm3      nRBC 0.0 /100 WBC              Imaging:    XR Shoulder 2+ View Left    Result Date: 2/4/2024  PROCEDURE: XR SHOULDER 2+ VW LEFT  COMPARISON: None  INDICATIONS: LEFT GENERAL SHOULDER PAIN POST FALL  FINDINGS:  There is no acute fracture or dislocation noted.  No significant degenerative change appreciated at the AC joint or glenohumeral joint.  Limited imaging of the chest is grossly unremarkable in appearance.  Minimal induration of the soft tissues overlying the left shoulder may represent a small contusion given history        1. Mild soft tissue swelling with no definite acute osseous abnormality      QUENTIN GUSMAN MD       Electronically Signed and Approved By: QUENTIN GUSMAN MD on 2/04/2024 at 14:07             XR Hips Bilateral With or Without Pelvis 2 View    Result Date: 2/4/2024  PROCEDURE: XR HIPS BILATERAL W OR WO PELVIS 2 VIEW  COMPARISON: [facility], LEFT HIP/PELVIS, 4/15/2021, 10:33.  INDICATIONS: BILATERAL HIP PAIN POST FALL. LIMITED MOBILITY OF RIGHT LEG  FINDINGS:  There is a comminuted intratrochanteric fracture of the proximal right femur with extension into the lesser and greater trochanter.  The hip joint is maintained in articulation.  Mild degenerative changes are noted at the right hip.  The bony pelvis appears to be intact evaluation of the sacrum and coccyx is limited by osteopenia as well as overlying bowel gas and fecal material.  Left hip demonstrates mild degenerative changes with no definite acute fracture or dislocation on limited imaging.        1. Comminuted intratrochanteric fracture proximal right femur 2. No definite acute abnormality on limited imaging of the left hip       QUENTIN GUSMAN MD       Electronically Signed and Approved By: QUENTIN GUSMAN MD on 2/04/2024 at 14:03             XR Chest 1 View    Result Date: 2/4/2024  PROCEDURE: XR CHEST 1 VW  COMPARISON: [facility], CR, XR CHEST 1 VW, 7/29/2022, 9:27.  INDICATIONS: FALL  FINDINGS:  Study is limited by overlying support and monitoring apparatus.  The heart and mediastinal contours appear within normal limits.  Interfaces from overlying skin folds noted bilaterally.  The lungs appear to be grossly clear.  Osseous structures demonstrate no acute abnormality        1. Limited negative portable chest       QUENTIN GUSMAN MD       Electronically Signed and Approved By: QUENTIN GUSMAN MD on 2/04/2024 at 14:00                Differential Diagnosis and Discussion:    Trauma:  Differential diagnosis considered but not limited to were subarachnoid hemorrhage, intracranial bleeding, pneumothorax, cardiac contusion, lung contusion, intra-abdominal bleeding, and compartment syndrome of any extremity or other significant traumatic pathology    All labs were reviewed and interpreted by me.  All X-rays impressions were independently interpreted by me.  CT scan radiology impression was interpreted by me.    MDM     Amount and/or Complexity of Data Reviewed  Clinical lab tests: reviewed  Tests in the radiology section of CPT®: reviewed  Tests in the medicine section of CPT®: reviewed  Decide to obtain previous medical records or to obtain history from someone other than the patient: yes                 Patient Care Considerations:    SEPSIS was considered but is NOT present in the emergency department as SIRS criteria is not present.      Consultants/Shared Management Plan:    Hospitalist: I have discussed the case with Vladimir who agrees to accept the patient for admission.    Social Determinants of Health:    Patient lives alone      Disposition and Care Coordination:    Admit:   Through independent evaluation of the  patient's history, physical, and imperical data, the patient meets criteria for inpatient admission to the hospital.        Final diagnoses:   Fracture        ED Disposition       ED Disposition   Decision to Admit    Condition   --    Comment   Level of Care: Med/Surg [1]   Diagnosis: Fracture [[id number]]   Admitting Physician: TURNER LIGHT [[id number]]   Attending Physician: TURNER LIGHT [[id number]]   Certification: I Certify That Inpatient Hospital Services Are Medically Necessary For Greater Than 2 Midnights                 This medical record created using voice recognition software.             Rodney Silvestre MD  02/04/24 0981

## 2024-02-05 ENCOUNTER — ANESTHESIA EVENT (OUTPATIENT)
Dept: PERIOP | Facility: HOSPITAL | Age: 79
End: 2024-02-05
Payer: MEDICARE

## 2024-02-05 ENCOUNTER — APPOINTMENT (OUTPATIENT)
Dept: GENERAL RADIOLOGY | Facility: HOSPITAL | Age: 79
End: 2024-02-05
Payer: MEDICARE

## 2024-02-05 ENCOUNTER — APPOINTMENT (OUTPATIENT)
Dept: CARDIOLOGY | Facility: HOSPITAL | Age: 79
End: 2024-02-05
Payer: MEDICARE

## 2024-02-05 ENCOUNTER — ANESTHESIA (OUTPATIENT)
Dept: PERIOP | Facility: HOSPITAL | Age: 79
End: 2024-02-05
Payer: MEDICARE

## 2024-02-05 PROBLEM — S72.141A CLOSED 2-PART INTERTROCHANTERIC FRACTURE OF PROXIMAL END OF RIGHT FEMUR: Status: ACTIVE | Noted: 2024-02-04

## 2024-02-05 LAB
ANION GAP SERPL CALCULATED.3IONS-SCNC: 10.6 MMOL/L (ref 5–15)
BASOPHILS # BLD AUTO: 0.03 10*3/MM3 (ref 0–0.2)
BASOPHILS NFR BLD AUTO: 0.2 % (ref 0–1.5)
BH CV ECHO MEAS - AO ROOT DIAM: 2.6 CM
BH CV ECHO MEAS - EDV(CUBED): 115.8 ML
BH CV ECHO MEAS - EDV(MOD-SP4): 45.1 ML
BH CV ECHO MEAS - EF(MOD-SP4): 66.1 %
BH CV ECHO MEAS - ESV(CUBED): 38.3 ML
BH CV ECHO MEAS - ESV(MOD-SP4): 15.3 ML
BH CV ECHO MEAS - FS: 30.9 %
BH CV ECHO MEAS - IVS/LVPW: 0.64 CM
BH CV ECHO MEAS - IVSD: 0.79 CM
BH CV ECHO MEAS - LA DIMENSION: 4.2 CM
BH CV ECHO MEAS - LAT PEAK E' VEL: 12.3 CM/SEC
BH CV ECHO MEAS - LV DIASTOLIC VOL/BSA (35-75): 22.8 CM2
BH CV ECHO MEAS - LV MASS(C)D: 176.9 GRAMS
BH CV ECHO MEAS - LV SYSTOLIC VOL/BSA (12-30): 7.7 CM2
BH CV ECHO MEAS - LVIDD: 4.9 CM
BH CV ECHO MEAS - LVIDS: 3.4 CM
BH CV ECHO MEAS - LVPWD: 1.23 CM
BH CV ECHO MEAS - MED PEAK E' VEL: 5.9 CM/SEC
BH CV ECHO MEAS - MV A MAX VEL: 2.6 CM/SEC
BH CV ECHO MEAS - MV DEC SLOPE: 1028 CM/SEC2
BH CV ECHO MEAS - MV DEC TIME: 0.1 SEC
BH CV ECHO MEAS - MV E MAX VEL: 97.7 CM/SEC
BH CV ECHO MEAS - MV E/A: 38
BH CV ECHO MEAS - RAP SYSTOLE: 5 MMHG
BH CV ECHO MEAS - RVDD: 2.33 CM
BH CV ECHO MEAS - RVSP: 25.6 MMHG
BH CV ECHO MEAS - SI(MOD-SP4): 15.1 ML/M2
BH CV ECHO MEAS - SV(MOD-SP4): 29.8 ML
BH CV ECHO MEAS - TR MAX PG: 20.6 MMHG
BH CV ECHO MEAS - TR MAX VEL: 226.7 CM/SEC
BH CV ECHO MEASUREMENTS AVERAGE E/E' RATIO: 10.74
BUN SERPL-MCNC: 27 MG/DL (ref 8–23)
BUN/CREAT SERPL: 28.4 (ref 7–25)
CALCIUM SPEC-SCNC: 9 MG/DL (ref 8.6–10.5)
CHLORIDE SERPL-SCNC: 100 MMOL/L (ref 98–107)
CK MB SERPL-CCNC: 13.81 NG/ML
CK SERPL-CCNC: 1845 U/L (ref 20–180)
CO2 SERPL-SCNC: 23.4 MMOL/L (ref 22–29)
CREAT SERPL-MCNC: 0.95 MG/DL (ref 0.57–1)
DEPRECATED RDW RBC AUTO: 44.3 FL (ref 37–54)
EGFRCR SERPLBLD CKD-EPI 2021: 61.5 ML/MIN/1.73
EOSINOPHIL # BLD AUTO: 0 10*3/MM3 (ref 0–0.4)
EOSINOPHIL NFR BLD AUTO: 0 % (ref 0.3–6.2)
ERYTHROCYTE [DISTWIDTH] IN BLOOD BY AUTOMATED COUNT: 13.2 % (ref 12.3–15.4)
GLUCOSE BLDC GLUCOMTR-MCNC: 136 MG/DL (ref 70–99)
GLUCOSE BLDC GLUCOMTR-MCNC: 167 MG/DL (ref 70–99)
GLUCOSE BLDC GLUCOMTR-MCNC: 178 MG/DL (ref 70–99)
GLUCOSE SERPL-MCNC: 185 MG/DL (ref 65–99)
HCT VFR BLD AUTO: 35.3 % (ref 34–46.6)
HGB BLD-MCNC: 11.4 G/DL (ref 12–15.9)
IMM GRANULOCYTES # BLD AUTO: 0.09 10*3/MM3 (ref 0–0.05)
IMM GRANULOCYTES NFR BLD AUTO: 0.7 % (ref 0–0.5)
LYMPHOCYTES # BLD AUTO: 1.39 10*3/MM3 (ref 0.7–3.1)
LYMPHOCYTES NFR BLD AUTO: 11.6 % (ref 19.6–45.3)
MCH RBC QN AUTO: 29.5 PG (ref 26.6–33)
MCHC RBC AUTO-ENTMCNC: 32.3 G/DL (ref 31.5–35.7)
MCV RBC AUTO: 91.5 FL (ref 79–97)
MONOCYTES # BLD AUTO: 1.3 10*3/MM3 (ref 0.1–0.9)
MONOCYTES NFR BLD AUTO: 10.8 % (ref 5–12)
NEUTROPHILS NFR BLD AUTO: 76.7 % (ref 42.7–76)
NEUTROPHILS NFR BLD AUTO: 9.2 10*3/MM3 (ref 1.7–7)
NRBC BLD AUTO-RTO: 0 /100 WBC (ref 0–0.2)
PLATELET # BLD AUTO: 169 10*3/MM3 (ref 140–450)
PMV BLD AUTO: 10.8 FL (ref 6–12)
POTASSIUM SERPL-SCNC: 4.8 MMOL/L (ref 3.5–5.2)
RBC # BLD AUTO: 3.86 10*6/MM3 (ref 3.77–5.28)
SODIUM SERPL-SCNC: 134 MMOL/L (ref 136–145)
WBC NRBC COR # BLD AUTO: 12.01 10*3/MM3 (ref 3.4–10.8)

## 2024-02-05 PROCEDURE — 82550 ASSAY OF CK (CPK): CPT | Performed by: INTERNAL MEDICINE

## 2024-02-05 PROCEDURE — 80048 BASIC METABOLIC PNL TOTAL CA: CPT | Performed by: HOSPITALIST

## 2024-02-05 PROCEDURE — 82948 REAGENT STRIP/BLOOD GLUCOSE: CPT

## 2024-02-05 PROCEDURE — 85025 COMPLETE CBC W/AUTO DIFF WBC: CPT | Performed by: HOSPITALIST

## 2024-02-05 PROCEDURE — 25810000003 LACTATED RINGERS PER 1000 ML: Performed by: NURSE ANESTHETIST, CERTIFIED REGISTERED

## 2024-02-05 PROCEDURE — 99222 1ST HOSP IP/OBS MODERATE 55: CPT | Performed by: ORTHOPAEDIC SURGERY

## 2024-02-05 PROCEDURE — C1713 ANCHOR/SCREW BN/BN,TIS/BN: HCPCS | Performed by: ORTHOPAEDIC SURGERY

## 2024-02-05 PROCEDURE — 25010000002 PROPOFOL 10 MG/ML EMULSION: Performed by: NURSE ANESTHETIST, CERTIFIED REGISTERED

## 2024-02-05 PROCEDURE — 93306 TTE W/DOPPLER COMPLETE: CPT | Performed by: INTERNAL MEDICINE

## 2024-02-05 PROCEDURE — 99222 1ST HOSP IP/OBS MODERATE 55: CPT | Performed by: INTERNAL MEDICINE

## 2024-02-05 PROCEDURE — 25010000002 FENTANYL CITRATE (PF) 50 MCG/ML SOLUTION: Performed by: NURSE ANESTHETIST, CERTIFIED REGISTERED

## 2024-02-05 PROCEDURE — 27245 TREAT THIGH FRACTURE: CPT | Performed by: ORTHOPAEDIC SURGERY

## 2024-02-05 PROCEDURE — 25010000002 CEFAZOLIN IN DEXTROSE 2-4 GM/100ML-% SOLUTION: Performed by: ORTHOPAEDIC SURGERY

## 2024-02-05 PROCEDURE — 93306 TTE W/DOPPLER COMPLETE: CPT

## 2024-02-05 PROCEDURE — C1769 GUIDE WIRE: HCPCS | Performed by: ORTHOPAEDIC SURGERY

## 2024-02-05 PROCEDURE — 76000 FLUOROSCOPY <1 HR PHYS/QHP: CPT

## 2024-02-05 PROCEDURE — 25010000002 ONDANSETRON PER 1 MG: Performed by: NURSE ANESTHETIST, CERTIFIED REGISTERED

## 2024-02-05 PROCEDURE — 25010000002 SUGAMMADEX 200 MG/2ML SOLUTION: Performed by: NURSE ANESTHETIST, CERTIFIED REGISTERED

## 2024-02-05 PROCEDURE — 25010000002 CEFAZOLIN PER 500 MG: Performed by: NURSE ANESTHETIST, CERTIFIED REGISTERED

## 2024-02-05 PROCEDURE — 82553 CREATINE MB FRACTION: CPT | Performed by: INTERNAL MEDICINE

## 2024-02-05 PROCEDURE — 0QS636Z REPOSITION RIGHT UPPER FEMUR WITH INTRAMEDULLARY INTERNAL FIXATION DEVICE, PERCUTANEOUS APPROACH: ICD-10-PCS | Performed by: ORTHOPAEDIC SURGERY

## 2024-02-05 PROCEDURE — 25010000002 DEXAMETHASONE PER 1 MG: Performed by: NURSE ANESTHETIST, CERTIFIED REGISTERED

## 2024-02-05 PROCEDURE — 94799 UNLISTED PULMONARY SVC/PX: CPT

## 2024-02-05 PROCEDURE — 36415 COLL VENOUS BLD VENIPUNCTURE: CPT | Performed by: HOSPITALIST

## 2024-02-05 DEVICE — SCRW CORT FA FUL/THRD HEX3.5 TI 5X32.5MM: Type: IMPLANTABLE DEVICE | Site: HIP | Status: FUNCTIONAL

## 2024-02-05 DEVICE — IMPLANTABLE DEVICE: Type: IMPLANTABLE DEVICE | Site: HIP | Status: FUNCTIONAL

## 2024-02-05 RX ORDER — DEXAMETHASONE SODIUM PHOSPHATE 4 MG/ML
INJECTION, SOLUTION INTRA-ARTICULAR; INTRALESIONAL; INTRAMUSCULAR; INTRAVENOUS; SOFT TISSUE AS NEEDED
Status: DISCONTINUED | OUTPATIENT
Start: 2024-02-05 | End: 2024-02-05 | Stop reason: SURG

## 2024-02-05 RX ORDER — ONDANSETRON 2 MG/ML
INJECTION INTRAMUSCULAR; INTRAVENOUS AS NEEDED
Status: DISCONTINUED | OUTPATIENT
Start: 2024-02-05 | End: 2024-02-05 | Stop reason: SURG

## 2024-02-05 RX ORDER — LIDOCAINE HYDROCHLORIDE 20 MG/ML
INJECTION, SOLUTION EPIDURAL; INFILTRATION; INTRACAUDAL; PERINEURAL AS NEEDED
Status: DISCONTINUED | OUTPATIENT
Start: 2024-02-05 | End: 2024-02-05 | Stop reason: SURG

## 2024-02-05 RX ORDER — PROMETHAZINE HYDROCHLORIDE 25 MG/1
25 SUPPOSITORY RECTAL ONCE AS NEEDED
Status: DISCONTINUED | OUTPATIENT
Start: 2024-02-05 | End: 2024-02-05 | Stop reason: HOSPADM

## 2024-02-05 RX ORDER — ONDANSETRON 2 MG/ML
4 INJECTION INTRAMUSCULAR; INTRAVENOUS ONCE AS NEEDED
Status: DISCONTINUED | OUTPATIENT
Start: 2024-02-05 | End: 2024-02-05 | Stop reason: HOSPADM

## 2024-02-05 RX ORDER — PROPOFOL 10 MG/ML
VIAL (ML) INTRAVENOUS AS NEEDED
Status: DISCONTINUED | OUTPATIENT
Start: 2024-02-05 | End: 2024-02-05 | Stop reason: SURG

## 2024-02-05 RX ORDER — OXYCODONE HYDROCHLORIDE 5 MG/1
5 TABLET ORAL
Status: DISCONTINUED | OUTPATIENT
Start: 2024-02-05 | End: 2024-02-05 | Stop reason: HOSPADM

## 2024-02-05 RX ORDER — FENTANYL CITRATE 50 UG/ML
INJECTION, SOLUTION INTRAMUSCULAR; INTRAVENOUS AS NEEDED
Status: DISCONTINUED | OUTPATIENT
Start: 2024-02-05 | End: 2024-02-05 | Stop reason: SURG

## 2024-02-05 RX ORDER — CEFAZOLIN SODIUM 1 G/3ML
INJECTION, POWDER, FOR SOLUTION INTRAMUSCULAR; INTRAVENOUS AS NEEDED
Status: DISCONTINUED | OUTPATIENT
Start: 2024-02-05 | End: 2024-02-05 | Stop reason: SURG

## 2024-02-05 RX ORDER — HYDROCODONE BITARTRATE AND ACETAMINOPHEN 7.5; 325 MG/1; MG/1
2 TABLET ORAL EVERY 4 HOURS PRN
Status: DISCONTINUED | OUTPATIENT
Start: 2024-02-05 | End: 2024-02-08 | Stop reason: HOSPADM

## 2024-02-05 RX ORDER — ROCURONIUM BROMIDE 10 MG/ML
INJECTION, SOLUTION INTRAVENOUS AS NEEDED
Status: DISCONTINUED | OUTPATIENT
Start: 2024-02-05 | End: 2024-02-05 | Stop reason: SURG

## 2024-02-05 RX ORDER — PROMETHAZINE HYDROCHLORIDE 12.5 MG/1
25 TABLET ORAL ONCE AS NEEDED
Status: DISCONTINUED | OUTPATIENT
Start: 2024-02-05 | End: 2024-02-05 | Stop reason: HOSPADM

## 2024-02-05 RX ORDER — DILTIAZEM HCL IN NACL,ISO-OSM 125 MG/125
5-15 PLASTIC BAG, INJECTION (ML) INTRAVENOUS
Status: DISCONTINUED | OUTPATIENT
Start: 2024-02-05 | End: 2024-02-07

## 2024-02-05 RX ORDER — PHENYLEPHRINE HCL IN 0.9% NACL 1 MG/10 ML
SYRINGE (ML) INTRAVENOUS AS NEEDED
Status: DISCONTINUED | OUTPATIENT
Start: 2024-02-05 | End: 2024-02-05 | Stop reason: SURG

## 2024-02-05 RX ORDER — SODIUM CHLORIDE, SODIUM LACTATE, POTASSIUM CHLORIDE, CALCIUM CHLORIDE 600; 310; 30; 20 MG/100ML; MG/100ML; MG/100ML; MG/100ML
INJECTION, SOLUTION INTRAVENOUS CONTINUOUS PRN
Status: DISCONTINUED | OUTPATIENT
Start: 2024-02-05 | End: 2024-02-05 | Stop reason: SURG

## 2024-02-05 RX ORDER — CEFAZOLIN SODIUM 2 G/100ML
2000 INJECTION, SOLUTION INTRAVENOUS EVERY 8 HOURS
Qty: 200 ML | Refills: 0 | Status: COMPLETED | OUTPATIENT
Start: 2024-02-05 | End: 2024-02-06

## 2024-02-05 RX ORDER — HYDROCODONE BITARTRATE AND ACETAMINOPHEN 7.5; 325 MG/1; MG/1
1 TABLET ORAL EVERY 4 HOURS PRN
Status: DISCONTINUED | OUTPATIENT
Start: 2024-02-05 | End: 2024-02-08 | Stop reason: HOSPADM

## 2024-02-05 RX ADMIN — LIDOCAINE HYDROCHLORIDE 40 MG: 20 INJECTION, SOLUTION EPIDURAL; INFILTRATION; INTRACAUDAL; PERINEURAL at 15:35

## 2024-02-05 RX ADMIN — FENTANYL CITRATE 25 MCG: 50 INJECTION, SOLUTION INTRAMUSCULAR; INTRAVENOUS at 15:35

## 2024-02-05 RX ADMIN — METOPROLOL SUCCINATE 25 MG: 25 TABLET, EXTENDED RELEASE ORAL at 22:34

## 2024-02-05 RX ADMIN — SUGAMMADEX 200 MG: 100 INJECTION, SOLUTION INTRAVENOUS at 16:17

## 2024-02-05 RX ADMIN — FENTANYL CITRATE 25 MCG: 50 INJECTION, SOLUTION INTRAMUSCULAR; INTRAVENOUS at 15:59

## 2024-02-05 RX ADMIN — DEXAMETHASONE SODIUM PHOSPHATE 4 MG: 4 INJECTION, SOLUTION INTRAMUSCULAR; INTRAVENOUS at 15:35

## 2024-02-05 RX ADMIN — METOPROLOL SUCCINATE 25 MG: 25 TABLET, EXTENDED RELEASE ORAL at 08:30

## 2024-02-05 RX ADMIN — CEFAZOLIN 2 G: 1 INJECTION, POWDER, FOR SOLUTION INTRAMUSCULAR; INTRAVENOUS at 15:27

## 2024-02-05 RX ADMIN — Medication 100 MCG: at 15:37

## 2024-02-05 RX ADMIN — DILTIAZEM HYDROCHLORIDE 5 MG/HR: 5 INJECTION, SOLUTION INTRAVENOUS at 13:47

## 2024-02-05 RX ADMIN — DILTIAZEM HYDROCHLORIDE 10 MG: 5 INJECTION INTRAVENOUS at 00:09

## 2024-02-05 RX ADMIN — DOCUSATE SODIUM 50MG AND SENNOSIDES 8.6MG 2 TABLET: 8.6; 5 TABLET, FILM COATED ORAL at 22:33

## 2024-02-05 RX ADMIN — ROSUVASTATIN CALCIUM 20 MG: 20 TABLET, FILM COATED ORAL at 22:33

## 2024-02-05 RX ADMIN — HYDROCODONE BITARTRATE AND ACETAMINOPHEN 1 TABLET: 5; 325 TABLET ORAL at 08:30

## 2024-02-05 RX ADMIN — FENTANYL CITRATE 25 MCG: 50 INJECTION, SOLUTION INTRAMUSCULAR; INTRAVENOUS at 16:11

## 2024-02-05 RX ADMIN — SODIUM CHLORIDE, POTASSIUM CHLORIDE, SODIUM LACTATE AND CALCIUM CHLORIDE: 600; 310; 30; 20 INJECTION, SOLUTION INTRAVENOUS at 15:27

## 2024-02-05 RX ADMIN — FENTANYL CITRATE 25 MCG: 50 INJECTION, SOLUTION INTRAMUSCULAR; INTRAVENOUS at 15:48

## 2024-02-05 RX ADMIN — ONDANSETRON 4 MG: 2 INJECTION INTRAMUSCULAR; INTRAVENOUS at 15:35

## 2024-02-05 RX ADMIN — CEFAZOLIN SODIUM 2000 MG: 2 INJECTION, SOLUTION INTRAVENOUS at 22:34

## 2024-02-05 RX ADMIN — Medication 10 ML: at 08:31

## 2024-02-05 RX ADMIN — Medication 200 MCG: at 15:39

## 2024-02-05 RX ADMIN — ROCURONIUM BROMIDE 30 MG: 10 INJECTION, SOLUTION INTRAVENOUS at 15:35

## 2024-02-05 RX ADMIN — PROPOFOL 100 MG: 10 INJECTION, EMULSION INTRAVENOUS at 15:35

## 2024-02-05 NOTE — ANESTHESIA PREPROCEDURE EVALUATION
" Anesthesia Evaluation     Patient summary reviewed and Nursing notes reviewed   no history of anesthetic complications:   NPO Solid Status: > 8 hours  NPO Liquid Status: > 2 hours           Airway   Mallampati: II  TM distance: >3 FB  Neck ROM: full  No difficulty expected  Dental    (+) upper dentures and poor dentition        Pulmonary - normal exam    breath sounds clear to auscultation  (+) a smoker Former,  Cardiovascular   Exercise tolerance: poor (<4 METS)    PT is on anticoagulation therapy  Rhythm: irregular  Rate: abnormal    (+) hypertension, dysrhythmias Atrial Fib, CHF , hyperlipidemia    ROS comment: Last Eliquis 2/3    Neuro/Psych  (+) CVA (no residual)  GI/Hepatic/Renal/Endo    (+) thyroid problem     Musculoskeletal         ROS comment: Rhabdo this admission  Abdominal    Substance History      OB/GYN          Other        ROS/Med Hx Other: PAT Nursing Notes unavailable.             FROM CARDIOLOGY NOTE TODAY:  \"Paroxysmal atrial fibrillation : Now with rapid ventricular rates, likely triggered by pain.  She is on anticoagulation at home.  Denies any chest pain or palpitations or shortness of breath     Elevated troponin : High sensitive troponins mildly elevated.  Total CPK significantly elevated with a mild elevation in CK-MB.  This is consistent with rhabdomyolysis, unlikely to be related to acute coronary syndrome.  She is chest pain-free.     Mitral regurgitation : Per previous echocardiogram done in 2021     Right hip fracture        We will initiate Cardizem infusion for adequate control of heart rate in anticipation of surgery later today     Echocardiogram to reevaluate LV function and rule out significant valvular abnormalities\"         Anesthesia Plan    ASA 3     general     Reason for not using neuraxial anesthesia or peripheral nerve block: Anticoagulated  (Seed and cleared by cardiology.  Afib with RVR, to start cardizem ggt preop.  Elevated trop secondary to fall and rhabdo. " Transfering to 5 floor    [place], +/- [place]      )    Anesthetic plan, risks, benefits, and alternatives have been provided, discussed and informed consent has been obtained with: patient.        CODE STATUS:    Level Of Support Discussed With: Patient  Code Status (Patient has no pulse and is not breathing): CPR (Attempt to Resuscitate)  Medical Interventions (Patient has pulse or is breathing): Full Support

## 2024-02-05 NOTE — H&P (VIEW-ONLY)
[facility]   Consult Note    Patient Name: Maria Del Rosario Colmenares  : 1945  MRN: [id number]  Primary Care Physician:  Annmarie David APRN  Referring Physician: No ref. provider found  Date of admission: 2024    Subjective   Subjective     Reason for Consult/ Chief Complaint: Right hip pain    HPI:  Maria Del Rosario Colmenares is a 78 y.o. female who sustained a mechanical fall yesterday.  She was brought to the emergency department and found to have a right intertrochanteric proximal femur fracture.  She also had some left hip pain as well but x-rays were negative of the left hip.  She was admitted to the hospital for further evaluation and management.  She reports pain in the right hip with any movement.  She denies any other complaints.  She takes Eliquis for anticoagulation and last took this yesterday.    Review of Systems   All systems were reviewed and negative except for those mentioned in HPI    Personal History     Past Medical History:   Diagnosis Date    Arthritis     Chronic combined systolic (congestive) and diastolic (congestive) heart failure     Hypothyroidism     Mixed hyperlipidemia     Paroxysmal atrial fibrillation     SOB (shortness of breath)     Thyroid disorder        Past Surgical History:   Procedure Laterality Date    ABDOMINAL HYSTERECTOMY      APPENDECTOMY      COLONOSCOPY      HYSTERECTOMY         Family History: family history includes Lung cancer in her mother and paternal grandmother; No Known Problems in her father. Otherwise pertinent FHx was reviewed and not pertinent to current issue.    Social History:  reports that she has quit smoking. She has never used smokeless tobacco. She reports current alcohol use. She reports that she does not use drugs.    Home Medications:  apixaban, aspirin, furosemide, levothyroxine, metoprolol succinate XL, potassium chloride, and rosuvastatin    Allergies:  No Known Allergies    Objective    Objective     Vitals:   Temp:  [97.9 °F (36.6  °C)-99.3 °F (37.4 °C)] 98.6 °F (37 °C)  Heart Rate:  [] 86  Resp:  [18-28] 18  BP: (104-156)/() 134/82    Physical Exam:   Constitutional: Awake, alert   Eyes: PERRLA, sclerae anicteric, no conjunctival injection   HENT: NCAT, mucous membranes moist   Neck: Supple, no thyromegaly, no lymphadenopathy, trachea midline   Respiratory: Unlabored breathing   Cardiovascular: Regular heart rate   Gastrointestinal: Positive bowel sounds, soft, nontender, nondistended   Musculoskeletal: Right lower extremity is shortened and externally rotated.  Positive pulses.  Tender to palpation to the hip.  Pain with any movement of the hip.  Negative Cesario.   Psychiatric: Appropriate affect, cooperative   Neurologic: Oriented x 3, strength symmetric in all extremities, Cranial Nerves grossly intact to confrontation, speech clear   Skin: No rashes     Result Review    Result Review:  I have personally reviewed the results from the time of this admission to 2/5/2024 07:05 EST and agree with these findings:  [x]  Laboratory  []  Microbiology  [x]  Radiology  []  EKG/Telemetry   []  Cardiology/Vascular   []  Pathology  []  Old records  []  Other:    Most notable findings include:     Assessment & Plan   Assessment / Plan     Brief Patient Summary:  Maria Del Rosario Colmenares is a 78 y.o. female who has a right intertrochanteric proximal femur fracture    Active Hospital Problems:  Active Hospital Problems    Diagnosis     **Fracture     Closed 2-part intertrochanteric fracture of proximal end of right femur        Plan: I discussed treatment options with the patient and her daughter.  I recommended operative treatment with internal fixation of right proximal femur fracture.  Risks and benefits of surgery were discussed.  Informed consent was obtained and she wished to proceed.  Plan for n.p.o. and surgery later today.  Thank you for this consultation.        Electronically signed by Adrien Ordaz MD, 02/05/24, 7:05 AM EST.

## 2024-02-05 NOTE — ANESTHESIA POSTPROCEDURE EVALUATION
Patient: Maria Del Rosario Colmenares    Procedure Summary       Date: 02/05/24 Room / Location: [facility] OR 03 / [facility] MAIN OR    Anesthesia Start: 1527 Anesthesia Stop: 1635    Procedure: HIP TROCHANTERIC NAILING WITH INTRAMEDULLARY HIP SCREW (Right: Hip) Diagnosis:       Closed 2-part intertrochanteric fracture of right femur, initial encounter      (Closed 2-part intertrochanteric fracture of right femur, initial encounter [S72.141A])    Surgeons: Adrien Ordaz MD Provider: Kevin Contreras MD    Anesthesia Type: general ASA Status: 3            Anesthesia Type: general    Vitals  Vitals Value Taken Time   /92 02/05/24 1745   Temp 37.7 °C (99.9 °F) 02/05/24 1630   Pulse 103 02/05/24 1749   Resp 14 02/05/24 1720   SpO2 97 % 02/05/24 1749   Vitals shown include unfiled device data.      Post Anesthesia Care and Evaluation    Patient location during evaluation: bedside  Patient participation: complete - patient participated  Level of consciousness: awake  Pain management: adequate    Airway patency: patent  PONV Status: none  Cardiovascular status: acceptable  Respiratory status: acceptable  Hydration status: acceptable    Comments: An Anesthesiologist personally participated in the most demanding procedures (including induction and emergence if applicable) in the anesthesia plan, monitored the course of anesthesia administration at frequent intervals and remained physically present and available for immediate diagnosis and treatment of emergencies.

## 2024-02-05 NOTE — OP NOTE
HIP TROCHANTERIC NAILING WITH INTRAMEDULLARY HIP SCREW  Procedure Report    Patient Name:  Maria Del Rosario Colmenares  YOB: 1945    Date of Surgery:  2/5/2024     Indications: The patient is a 78-year-old female with right hip pain after mechanical fall.  X-rays revealed a right intertrochanteric proximal femur fracture.  The patient was admitted to the hospital and found to be stable for surgery today after evaluation by cardiology and the hospitalist.  Risks and benefits of surgery were discussed.  Risk of bleeding, infection, damage to neurovascular structures, heart attack, stroke, DVT/PE, anesthesia complications including death, need for additional procedures, among others were discussed.  Informed consent was obtained and she wished to proceed.    Pre-op Diagnosis:   Closed 2-part intertrochanteric fracture of right femur, initial encounter [S72.141A]       Post-Op Diagnosis Codes:     * Closed 2-part intertrochanteric fracture of right femur, initial encounter [S72.141A]    Procedure/CPT® Codes:      Procedure(s):  Right HIP TROCHANTERIC NAILING WITH INTRAMEDULLARY HIP SCREW    Staff:  Surgeon(s):  Adrien Ordaz MD    Assistant: Darrin Noble RN    Anesthesia: Choice    Estimated Blood Loss: 100 mL    Implants:    Implant Name Type Inv. Item Serial No.  Lot No. LRB No. Used Action   NAIL IM/FEM CEPH TI 21.5CM 11.5MM SHT RT STRL - [id number] Implant NAIL IM/FEM CEPH TI 21.5CM 11.5MM SHT RT STRL  [employer] [id number] Right 1 Implanted   SCRW LAG CEPH TI 10.5X95MM - [id number] Implant SCRW LAG CEPH TI 10.5X95MM  [employer] [id number] Right 1 Implanted   SCRW HELEN FA FUL/THRD HEX3.5 TI 5X32.5MM - [id number] Implant SCRW HELEN FA FUL/THRD HEX3.5 TI 5X32.5MM  [employer] [id number] Right 1 Implanted       Specimen:          None        Findings: Right hip intertrochanteric proximal femur fracture    Complications: None    Description of Procedure: The operative site was marked in  the preoperative holding area.  The patient was brought to the operating room.  Anesthesia was given.  The patient was placed supine on the Kid[facility] operative table. Well-padded traction boots were placed to the lower extremities. All bony prominences were padded.  We then placed a padded perineal post. Formal time out was held.  The patient received preoperative antibiotic. Traction and internal rotation was placed across the operative lower extremity. The nonoperative leg was extended and adducted.  Fluoroscopic imaging was taken AP and lateral plane to confirm anatomic reduction.    At this point, after prepping and draping, a longitudinal incision was made just proximal to the greater trochanter.  Subcutaneous tissues and fascia were divided.  A guide pin was placed at the tip of the greater trochanter, was driven into the intramedullary canal, centered on the canal on the lateral view.  The opening reamer and soft tissue protector were then used to create an opening hole with the greater trochanter and a ball-tipped guidewire was then inserted down into the distal femur.  This was confirmed to be at the center of the knee on both AP/lateral and fluoroscopic imaging.  The nail was measured.  We then began reaming sequentially the femur, reaming to 1.5 mm greater than the chosen nail size. The nail was inserted over the guide wire. The guide arm was placed onto the nail for aiming the lag screw.  A stab incision was made at the lateral thigh, and the fascia was divided.  The trocar was then inserted to the lateral femur.  The guide pin was drilled in the center of the femoral head on the AP and lateral view, and the lag screw length was measured.  The drill was used to drill for the lag screw and then an appropriately sized lag screw was inserted. At this point, the antirotation screw was placed in the proximal nail and was tightened.  A small stab incision was made to the proximal lateral thigh.  The trocars were  inserted and the distal interlocking screw was drilled, measured, and inserted.  The aiming arm was removed.  Final fluoroscopic images were taken showing the fracture well reduced and the hardware in good position.  The wounds were irrigated.  They were closed with #1 Vicryl in the fascial layer, 2-0 Vicryl subcutaneous layer, skin closed with staples.  A sterile dressing was placed.  The patient was awoken from anesthesia in stable condition.  There were no complications.  All counts were correct.  Stable to recovery.    Assistant: Darrin Noble RN  was responsible for performing the following activities: Retraction, Suction, Irrigation, and Placing Dressing and their skilled assistance was necessary for the success of this case.    Adrien Ordaz MD     Date: 2/5/2024  Time: 16:53 EST

## 2024-02-05 NOTE — CONSULTS
[facility]   Cardiology Consult Note    Patient Name: Maria Del Rosario Colmenares  : 1945  MRN: [id number]  Primary Care Physician:  Annmarie David APRN  Referring Physician: Elvin Bales MD    Date of admission: 2024    Subjective   Subjective     Reason for Consultation : Atrial fibrillation, preoperative cardiac risk stratification    Chief Complaint : Fall, right hip pain    HPI:  Maria Del Rosario Colmenares is a 78 y.o. female with paroxysmal atrial fibrillation, hypothyroidism.  She presented to the emergency room yesterday after sustaining a mechanical fall at home.  She was down for unknown period of time.  Subsequently noted to have bilateral hip pains and also shoulder pain.  In the emergency room, she was noted to have intertrochanteric fracture of the right femur.  There were no other bony fractures.  She was admitted, evaluated by orthopedic surgeons who recommended surgical correction of the fracture.    She was noted to be atrial fibrillation with rapid ventricular rates since admission.  She received IV bolus dose of Cardizem.  Heart rate is currently in 120s.  She denies any chest pain, shortness of breath or palpitations.    Review of Systems   All systems were reviewed and negative except for: Hip pain and shoulder pain    Personal History     Past Medical History:   Diagnosis Date    Arthritis     Chronic combined systolic (congestive) and diastolic (congestive) heart failure     Hypothyroidism     Mixed hyperlipidemia     Paroxysmal atrial fibrillation     SOB (shortness of breath)     Thyroid disorder         Family History: family history includes Lung cancer in her mother and paternal grandmother; No Known Problems in her father. Otherwise pertinent FHx was reviewed and not pertinent to current issue.    Social History:  reports that she has quit smoking. She has never used smokeless tobacco. She reports current alcohol use. She reports that she does not use drugs.    Home  Medications:  apixaban, aspirin, furosemide, levothyroxine, metoprolol succinate XL, potassium chloride, and rosuvastatin    Allergies:  No Known Allergies    Objective    Objective     Vitals:   Temp:  [97.9 °F (36.6 °C)-99.3 °F (37.4 °C)] 98.8 °F (37.1 °C)  Heart Rate:  [] 123  Resp:  [18-28] 18  BP: (104-156)/() 112/93      Physical Exam:   Constitutional: Awake, alert, No acute distress, pleasant   Eyes: PERRLA, sclerae anicteric, no conjunctival injection   HENT: NCAT, mucous membranes moist   Neck: Supple, no thyromegaly, no lymphadenopathy, trachea midline   Respiratory: Clear to auscultation bilaterally, nonlabored respirations    Cardiovascular: Tachycardic, irregular, systolic murmur heard , no gallops, palpable pedal pulses bilaterally   Gastrointestinal: Positive bowel sounds, soft, nontender, nondistended   Musculoskeletal: Right leg externally rotated, no edema, no clubbing or cyanosis to extremities   Psychiatric: Appropriate affect, cooperative   Neurologic: Oriented x 3, strength symmetric in all extremities, Cranial Nerves grossly intact to confrontation, speech clear   Skin: No rashes     Result Review    Result Review:  I have personally reviewed the results from the time of this admission to 2/5/2024 09:03 EST and agree with these findings:  [x]  Laboratory  []  Microbiology  [x]  Radiology  [x]  EKG/Telemetry   [x]  Cardiology/Vascular   []  Pathology  [x]  Old records  []  Other:  Most notable findings include:     CMP          2/4/2024    13:34 2/5/2024    04:18   CMP   Glucose 256  185    BUN 24  27    Creatinine 1.06  0.95    EGFR 53.9  61.5    Sodium 139  134    Potassium 3.8  4.8    Chloride 100  100    Calcium 9.3  9.0    Total Protein 6.7     Albumin 4.1     Globulin 2.6     Total Bilirubin 0.9     Alkaline Phosphatase 86     AST (SGOT) 61     ALT (SGPT) 34     Albumin/Globulin Ratio 1.6     BUN/Creatinine Ratio 22.6  28.4    Anion Gap 14.6  10.6       CBC          2/4/2024     13:34 2/5/2024    04:18   CBC   WBC 14.33  12.01    RBC 4.03  3.86    Hemoglobin 12.1  11.4    Hematocrit 36.6  35.3    MCV 90.8  91.5    MCH 30.0  29.5    MCHC 33.1  32.3    RDW 13.2  13.2    Platelets 196  169        Latest Reference Range & Units 02/04/24 13:34 02/04/24 16:20 02/05/24 04:18   Creatine Kinase 20 - 180 U/L 2,635 (H)  1,845 (H)   CKMB <=5.30 ng/mL   13.81 (H)   HS Troponin T <14 ng/L 82 (C) 73 (C)    Troponin T Delta >=-4 - <+4 ng/L  -9 (L)    proBNP 0.0 - 1,800.0 pg/mL 15,823.0 (H)           EKG done on admission showed atrial fibrillation with rapid ventricular rates, occasional PVCs.    Assessment & Plan   Assessment / Plan     Brief Patient Summary:  Maria Del Rosario Colmenares is a 78 y.o. female with paroxysmal atrial fibrillation, hypothyroidism.  She presented to the emergency room yesterday after sustaining a mechanical fall at home.  She is admitted for intertrochanteric fracture of the right femur.  Also noted to be in atrial fibrillation with RVR    Active Hospital Problems:  Active Hospital Problems    Diagnosis     **Fracture     Closed 2-part intertrochanteric fracture of proximal end of right femur      Paroxysmal atrial fibrillation : Now with rapid ventricular rates, likely triggered by pain.  She is on anticoagulation at home.  Denies any chest pain or palpitations or shortness of breath    Elevated troponin : High sensitive troponins mildly elevated.  Total CPK significantly elevated with a mild elevation in CK-MB.  This is consistent with rhabdomyolysis, unlikely to be related to acute coronary syndrome.  She is chest pain-free.    Mitral regurgitation : Per previous echocardiogram done in 2021    Right hip fracture    Plan:     We will initiate Cardizem infusion for adequate control of heart rate in anticipation of surgery later today    Echocardiogram to reevaluate LV function and rule out significant valvular abnormalities    Other management plans based on clinical course and test  results.  If echo shows normal LV function, no further preoperative cardiac workup required.  She will likely need to be on Cardizem drip during the procedure.    Will follow    Electronically signed by Jermaine Powell MD, 02/05/24, 9:03 AM EST.    ADDENDUM  ( 1220 PM)    Echocardiogram done    Results for orders placed during the hospital encounter of 02/04/24    Adult Transthoracic Echo Complete w/ Color, Spectral and Contrast if necessary per protocol    Interpretation Summary    Left ventricular systolic function is normal. Left ventricular ejection fraction appears to be 51 - 55%.    Left ventricular diastolic function was indeterminate due to atrial fibrillation.    The left atrial cavity is mildly dilated.    There is trace to mild mitral regurgitation.    Mild tricuspid valve regurgitation is present.  Estimated right ventricular systolic pressure from tricuspid regurgitation is normal (<35 mmHg).     No further preoperative cardiac workup required before orthopedic surgery.  She will be at moderate risk for perioperative cardiac events.  Continue Cardizem infusion as needed for adequate heart rate control during the perioperative period.

## 2024-02-05 NOTE — CONSULTS
[facility]   Consult Note    Patient Name: Maria Del Rosario Colmenares  : 1945  MRN: [id number]  Primary Care Physician:  Annmarie David APRN  Referring Physician: No ref. provider found  Date of admission: 2024    Subjective   Subjective     Reason for Consult/ Chief Complaint: Right hip pain    HPI:  Maria Del Rosario Colmenares is a 78 y.o. female who sustained a mechanical fall yesterday.  She was brought to the emergency department and found to have a right intertrochanteric proximal femur fracture.  She also had some left hip pain as well but x-rays were negative of the left hip.  She was admitted to the hospital for further evaluation and management.  She reports pain in the right hip with any movement.  She denies any other complaints.  She takes Eliquis for anticoagulation and last took this yesterday.    Review of Systems   All systems were reviewed and negative except for those mentioned in HPI    Personal History     Past Medical History:   Diagnosis Date    Arthritis     Chronic combined systolic (congestive) and diastolic (congestive) heart failure     Hypothyroidism     Mixed hyperlipidemia     Paroxysmal atrial fibrillation     SOB (shortness of breath)     Thyroid disorder        Past Surgical History:   Procedure Laterality Date    ABDOMINAL HYSTERECTOMY      APPENDECTOMY      COLONOSCOPY      HYSTERECTOMY         Family History: family history includes Lung cancer in her mother and paternal grandmother; No Known Problems in her father. Otherwise pertinent FHx was reviewed and not pertinent to current issue.    Social History:  reports that she has quit smoking. She has never used smokeless tobacco. She reports current alcohol use. She reports that she does not use drugs.    Home Medications:  apixaban, aspirin, furosemide, levothyroxine, metoprolol succinate XL, potassium chloride, and rosuvastatin    Allergies:  No Known Allergies    Objective    Objective     Vitals:   Temp:  [97.9 °F (36.6  °C)-99.3 °F (37.4 °C)] 98.6 °F (37 °C)  Heart Rate:  [] 86  Resp:  [18-28] 18  BP: (104-156)/() 134/82    Physical Exam:   Constitutional: Awake, alert   Eyes: PERRLA, sclerae anicteric, no conjunctival injection   HENT: NCAT, mucous membranes moist   Neck: Supple, no thyromegaly, no lymphadenopathy, trachea midline   Respiratory: Unlabored breathing   Cardiovascular: Regular heart rate   Gastrointestinal: Positive bowel sounds, soft, nontender, nondistended   Musculoskeletal: Right lower extremity is shortened and externally rotated.  Positive pulses.  Tender to palpation to the hip.  Pain with any movement of the hip.  Negative Cesario.   Psychiatric: Appropriate affect, cooperative   Neurologic: Oriented x 3, strength symmetric in all extremities, Cranial Nerves grossly intact to confrontation, speech clear   Skin: No rashes     Result Review    Result Review:  I have personally reviewed the results from the time of this admission to 2/5/2024 07:05 EST and agree with these findings:  [x]  Laboratory  []  Microbiology  [x]  Radiology  []  EKG/Telemetry   []  Cardiology/Vascular   []  Pathology  []  Old records  []  Other:    Most notable findings include:     Assessment & Plan   Assessment / Plan     Brief Patient Summary:  Maria Del Rosario Colmenares is a 78 y.o. female who has a right intertrochanteric proximal femur fracture    Active Hospital Problems:  Active Hospital Problems    Diagnosis     **Fracture     Closed 2-part intertrochanteric fracture of proximal end of right femur        Plan: I discussed treatment options with the patient and her daughter.  I recommended operative treatment with internal fixation of right proximal femur fracture.  Risks and benefits of surgery were discussed.  Informed consent was obtained and she wished to proceed.  Plan for n.p.o. and surgery later today.  Thank you for this consultation.        Electronically signed by Adrien Ordaz MD, 02/05/24, 7:05 AM EST.

## 2024-02-05 NOTE — PROGRESS NOTES
[facility]   Hospitalist Progress Note  Date: 2024  Patient Name: Maria Del Rosario Colmenares  : 1945  MRN: [id number]  Date of admission: 2024  Consultants:   -Cardiology: Dr. Jermaine Powell  -Orthopedic Surgery: Dr. Adrien Ordaz    Subjective   Subjective     Chief Complaint: Fall with right lower extremity and left shoulder pain    Summary:   Maria Del Rosario Colmenares is a 78 y.o. female with chronic paroxysmal atrial fibrillation on Eliquis (last dose 2024), hypothyroidism and hyperlipidemia that presented to the ED after mechanical fall.  Patient was found to have a right intertrochanteric proximal femur fracture.  Hospitalist service contacted for further evaluation and management.  Orthopedic surgery consulted.  Patient was noted to be in A-fib with RVR, she was given IV dose of Cardizem but heart rate remained elevated.  Cardiology consulted to assist in care.  Cardizem drip started    Interval Followup:   Patient remains in A-fib with RVR.  Heart rate has been maintained around 120 or higher.  She denies any active chest pain or shortness of breath.  Nursing with no additional acute issues to report.    Pain Medication:   -Norco  -IV Dilaudid    Objective   Objective     Vitals:   Temp:  [97.9 °F (36.6 °C)-99.3 °F (37.4 °C)] 98.8 °F (37.1 °C)  Heart Rate:  [] 123  Resp:  [18-28] 18  BP: (104-156)/() 112/93  Physical Exam   Gen: No acute distress, Conversant, Pleasant, sitting up in bed  Resp: CTAB, No w/r/r, No respiratory distress appreciated  Card: IRIR, systolic murmur appreciated  Abd: Soft, Nontender, Nondistended, + bowel sounds    Result Review    Result Review:  I have personally reviewed the results as below and agree with these findings:  []  Laboratory:   CMP          2024    13:34 2024    04:18   CMP   Glucose 256  185    BUN 24  27    Creatinine 1.06  0.95    EGFR 53.9  61.5    Sodium 139  134    Potassium 3.8  4.8    Chloride 100  100    Calcium 9.3  9.0    Total  Protein 6.7     Albumin 4.1     Globulin 2.6     Total Bilirubin 0.9     Alkaline Phosphatase 86     AST (SGOT) 61     ALT (SGPT) 34     Albumin/Globulin Ratio 1.6     BUN/Creatinine Ratio 22.6  28.4    Anion Gap 14.6  10.6      CBC          2/4/2024    13:34 2/5/2024    04:18   CBC   WBC 14.33  12.01    RBC 4.03  3.86    Hemoglobin 12.1  11.4    Hematocrit 36.6  35.3    MCV 90.8  91.5    MCH 30.0  29.5    MCHC 33.1  32.3    RDW 13.2  13.2    Platelets 196  169    Troponin downtrended.  CK: 1845.  []  Microbiology:   [x]  Radiology: CT head imaging personally reviewed.  Atrophy and chronic white matter changes appreciated.  [x]  EKG/Telemetry:    []  Cardiology/Vascular:    []  Pathology:  []  Old records:  []  Other:    Assessment & Plan   Assessment / Plan     Assessment:  Closed 2 part intertrochanteric fracture proximal end of right femur, initial encounter  Chronic paroxysmal atrial fibrillation on Eliquis as an outpatient, acutely in RVR  Elevated troponin  Hypothyroidism  Hyperlipidemia  Mitral regurgitation    Plan:  -Cardiology and Orthopedic Surgery consulted and following, appreciate assistance and recommendations in the care of this patient.  -Given difficulty controlling patient's rate which is likely triggered by her pain will start Cardizem drip  -Transfer patient to telemetry floor  -Echocardiogram ordered to reevaluate LV function and rule out significant valvular abnormalities  -Management discussed with cardiology.  If echo shows normal LV function no further preoperative cardiac workup required.  Patient will likely need to be on Cardizem drip during procedure.  -Hold home Eliquis  -Management discussed with Orthopedic Surgery.  Surgical intervention planned.  -Continue home levothyroxine metoprolol and statin therapy  -PT/OT consulted  -Pain management as needed Norco and as needed IV Dilaudid  -Will monitor electrolytes and renal function with BMP and magnesium level in the AM  -Will monitor  WBC and Hgb with CBC in the AM  -Clinical course will dictate further management     DVT Prophylaxis: SCDs  Diet: NPO  Dispo: PT/OT consulted  Code Status: Full code     Personally reviewed patients labs and imaging, discussed with patient and nurse at bedside. Discussed management with the following consultants: Cardiology and Orthopedic Surgery.     Part of this note may be an electronic transcription/translation of spoken language to printed text using the Dragon dictation system.    DVT prophylaxis:  Medical and mechanical DVT prophylaxis orders are present.        CODE STATUS:   Level Of Support Discussed With: Patient  Code Status (Patient has no pulse and is not breathing): CPR (Attempt to Resuscitate)  Medical Interventions (Patient has pulse or is breathing): Full Support        Electronically signed by Elvin Bales MD, 02/05/24, 10:10 AM EST.

## 2024-02-06 LAB
ANION GAP SERPL CALCULATED.3IONS-SCNC: 10.8 MMOL/L (ref 5–15)
BUN SERPL-MCNC: 26 MG/DL (ref 8–23)
BUN/CREAT SERPL: 31.7 (ref 7–25)
CALCIUM SPEC-SCNC: 8.6 MG/DL (ref 8.6–10.5)
CHLORIDE SERPL-SCNC: 102 MMOL/L (ref 98–107)
CO2 SERPL-SCNC: 23.2 MMOL/L (ref 22–29)
CREAT SERPL-MCNC: 0.82 MG/DL (ref 0.57–1)
DEPRECATED RDW RBC AUTO: 44 FL (ref 37–54)
EGFRCR SERPLBLD CKD-EPI 2021: 73.3 ML/MIN/1.73
ERYTHROCYTE [DISTWIDTH] IN BLOOD BY AUTOMATED COUNT: 13 % (ref 12.3–15.4)
GLUCOSE BLDC GLUCOMTR-MCNC: 140 MG/DL (ref 70–99)
GLUCOSE BLDC GLUCOMTR-MCNC: 147 MG/DL (ref 70–99)
GLUCOSE BLDC GLUCOMTR-MCNC: 168 MG/DL (ref 70–99)
GLUCOSE BLDC GLUCOMTR-MCNC: 183 MG/DL (ref 70–99)
GLUCOSE SERPL-MCNC: 165 MG/DL (ref 65–99)
HCT VFR BLD AUTO: 32.4 % (ref 34–46.6)
HGB BLD-MCNC: 10.4 G/DL (ref 12–15.9)
MAGNESIUM SERPL-MCNC: 2.2 MG/DL (ref 1.6–2.4)
MCH RBC QN AUTO: 29.9 PG (ref 26.6–33)
MCHC RBC AUTO-ENTMCNC: 32.1 G/DL (ref 31.5–35.7)
MCV RBC AUTO: 93.1 FL (ref 79–97)
PHOSPHATE SERPL-MCNC: 3.4 MG/DL (ref 2.5–4.5)
PLATELET # BLD AUTO: 145 10*3/MM3 (ref 140–450)
PMV BLD AUTO: 11 FL (ref 6–12)
POTASSIUM SERPL-SCNC: 4.8 MMOL/L (ref 3.5–5.2)
RBC # BLD AUTO: 3.48 10*6/MM3 (ref 3.77–5.28)
SODIUM SERPL-SCNC: 136 MMOL/L (ref 136–145)
WBC NRBC COR # BLD AUTO: 8.9 10*3/MM3 (ref 3.4–10.8)

## 2024-02-06 PROCEDURE — 85027 COMPLETE CBC AUTOMATED: CPT | Performed by: ORTHOPAEDIC SURGERY

## 2024-02-06 PROCEDURE — 99232 SBSQ HOSP IP/OBS MODERATE 35: CPT | Performed by: INTERNAL MEDICINE

## 2024-02-06 PROCEDURE — 97165 OT EVAL LOW COMPLEX 30 MIN: CPT

## 2024-02-06 PROCEDURE — 82948 REAGENT STRIP/BLOOD GLUCOSE: CPT

## 2024-02-06 PROCEDURE — 97161 PT EVAL LOW COMPLEX 20 MIN: CPT

## 2024-02-06 PROCEDURE — 94799 UNLISTED PULMONARY SVC/PX: CPT

## 2024-02-06 PROCEDURE — 80048 BASIC METABOLIC PNL TOTAL CA: CPT | Performed by: ORTHOPAEDIC SURGERY

## 2024-02-06 PROCEDURE — 84100 ASSAY OF PHOSPHORUS: CPT | Performed by: ORTHOPAEDIC SURGERY

## 2024-02-06 PROCEDURE — 83735 ASSAY OF MAGNESIUM: CPT | Performed by: ORTHOPAEDIC SURGERY

## 2024-02-06 PROCEDURE — 25010000002 CEFAZOLIN IN DEXTROSE 2-4 GM/100ML-% SOLUTION: Performed by: ORTHOPAEDIC SURGERY

## 2024-02-06 PROCEDURE — 63710000001 INSULIN REGULAR HUMAN PER 5 UNITS

## 2024-02-06 RX ORDER — METOPROLOL SUCCINATE 50 MG/1
50 TABLET, EXTENDED RELEASE ORAL EVERY 12 HOURS SCHEDULED
Status: DISCONTINUED | OUTPATIENT
Start: 2024-02-06 | End: 2024-02-08 | Stop reason: HOSPADM

## 2024-02-06 RX ORDER — ERGOCALCIFEROL 1.25 MG/1
50000 CAPSULE ORAL
COMMUNITY
Start: 2023-12-05

## 2024-02-06 RX ADMIN — LEVOTHYROXINE SODIUM 75 MCG: 75 TABLET ORAL at 06:34

## 2024-02-06 RX ADMIN — INSULIN HUMAN 2 UNITS: 100 INJECTION, SOLUTION PARENTERAL at 18:12

## 2024-02-06 RX ADMIN — APIXABAN 5 MG: 5 TABLET, FILM COATED ORAL at 10:18

## 2024-02-06 RX ADMIN — APIXABAN 5 MG: 5 TABLET, FILM COATED ORAL at 20:44

## 2024-02-06 RX ADMIN — DOCUSATE SODIUM 50MG AND SENNOSIDES 8.6MG 2 TABLET: 8.6; 5 TABLET, FILM COATED ORAL at 20:44

## 2024-02-06 RX ADMIN — Medication 10 ML: at 10:18

## 2024-02-06 RX ADMIN — METOPROLOL SUCCINATE 50 MG: 50 TABLET, EXTENDED RELEASE ORAL at 10:17

## 2024-02-06 RX ADMIN — CEFAZOLIN SODIUM 2000 MG: 2 INJECTION, SOLUTION INTRAVENOUS at 06:34

## 2024-02-06 RX ADMIN — METOPROLOL SUCCINATE 50 MG: 50 TABLET, EXTENDED RELEASE ORAL at 20:44

## 2024-02-06 RX ADMIN — DOCUSATE SODIUM 50MG AND SENNOSIDES 8.6MG 2 TABLET: 8.6; 5 TABLET, FILM COATED ORAL at 10:17

## 2024-02-06 RX ADMIN — DILTIAZEM HYDROCHLORIDE 5 MG/HR: 5 INJECTION, SOLUTION INTRAVENOUS at 06:35

## 2024-02-06 RX ADMIN — HYDROCODONE BITARTRATE AND ACETAMINOPHEN 2 TABLET: 7.5; 325 TABLET ORAL at 10:17

## 2024-02-06 RX ADMIN — ROSUVASTATIN CALCIUM 20 MG: 20 TABLET, FILM COATED ORAL at 20:44

## 2024-02-06 RX ADMIN — Medication 10 ML: at 20:45

## 2024-02-06 RX ADMIN — HYDROCODONE BITARTRATE AND ACETAMINOPHEN 2 TABLET: 7.5; 325 TABLET ORAL at 18:13

## 2024-02-06 RX ADMIN — INSULIN HUMAN 2 UNITS: 100 INJECTION, SOLUTION PARENTERAL at 20:44

## 2024-02-06 NOTE — THERAPY EVALUATION
Acute Care - Physical Therapy Initial Evaluation   Ralf     Patient Name: Maria Del Rosario Colmenares  : 1945  MRN: [id number]  Today's Date: 2024      Visit Dx:     ICD-10-CM ICD-9-CM   1. Fracture  T14.8XXA 829.0   2. Closed 2-part intertrochanteric fracture of right femur, initial encounter  S72.141A 820.21   3. Difficulty walking  R26.2 719.7   4. Decreased activities of daily living (ADL)  Z78.9 V49.89     Patient Active Problem List   Diagnosis    Nonrheumatic mitral valve regurgitation    Mixed hyperlipidemia    Chronic diastolic heart failure    Longstanding persistent atrial fibrillation    Stroke    Fracture    Closed 2-part intertrochanteric fracture of proximal end of right femur     Past Medical History:   Diagnosis Date    Arthritis     Chronic combined systolic (congestive) and diastolic (congestive) heart failure     Hypothyroidism     Mixed hyperlipidemia     Paroxysmal atrial fibrillation     SOB (shortness of breath)     Thyroid disorder      Past Surgical History:   Procedure Laterality Date    ABDOMINAL HYSTERECTOMY      APPENDECTOMY      COLONOSCOPY      HIP TROCHANTERIC NAILING WITH INTRAMEDULLARY HIP SCREW Right 2024    Procedure: HIP TROCHANTERIC NAILING WITH INTRAMEDULLARY HIP SCREW;  Surgeon: Adrien Ordaz MD;  Location: [facility] MAIN OR;  Service: Orthopedics;  Laterality: Right;    HYSTERECTOMY       PT Assessment (last 12 hours)       PT Evaluation and Treatment       Row Name 24 1000          Physical Therapy Time and Intention    Subjective Information complains of;weakness;pain  -AV     Document Type evaluation  -AV     Mode of Treatment individual therapy;physical therapy  -AV       Row Name 24 1000          General Information    Patient Profile Reviewed yes  -AV     Prior Level of Function independent:;all household mobility;gait;transfer;ADL's  Ambulated without an assistive device. No home O2.  -AV     Equipment Currently Used at Home none  -AV      Existing Precautions/Restrictions fall;weight bearing  WBAT RLE  -AV       Row Name 02/06/24 1000          Pain    Pretreatment Pain Rating 0/10 - no pain  -AV     Posttreatment Pain Rating 10/10  -AV     Pain Location - Side/Orientation Right  -AV     Pain Location - hip  -AV     Pain Intervention(s) --  Nurse in room at end of session, notified of right hip pain  -AV       [facility] Name 02/06/24 1000          Cognition    Orientation Status (Cognition) oriented to;person;place  -[facility] Name 02/06/24 1000          Range of Motion (ROM)    Range of Motion bilateral lower extremities;ROM is WFL  AAROM  -[facility] Name 02/06/24 1000          Strength (Manual Muscle Testing)    Strength (Manual Muscle Testing) right lower extremity strength;left lower extremity strength  -AV     Left Lower Extremity Strength hip;knee;ankle  -AV     Hip, Left (Strength) 3+/5  -AV     Knee, Left (Strength) 3+/5  -AV     Ankle, Left (Strength) 3/5  -AV     Right Lower Extremity Strength hip;knee;ankle  Limited due to increased pain  -AV     Hip, Right (Strength) 2-/5  -AV     Knee, Right (Strength) 2+/5  -AV     Ankle, Right (Strength) 3/5  -AV       Row Name 02/06/24 1000          Mobility    Extremity Weight-bearing Status right lower extremity  -AV     Right Lower Extremity (Weight-bearing Status) weight-bearing as tolerated (WBAT)  -[facility] Name 02/06/24 1000          Bed Mobility    Bed Mobility supine-sit;sit-supine  -AV     Supine-Sit [place] (Bed Mobility) maximum assist (25% patient effort)  -AV     Sit-Supine [place] (Bed Mobility) maximum assist (25% patient effort)  -AV     Bed Mobility, Safety Issues decreased use of legs for bridging/pushing;decreased use of arms for pushing/pulling  -[facility] Name 02/06/24 1000          Transfers    Transfers sit-stand transfer;stand-sit transfer  -AV     Comment, (Transfers) Patient completed x4 transfers with max assist and RW. Patient maintained standing for ~20  seconds each time but, was unable to stand with upright trunk posture or extend bilateral knees. Increased fatigue noted with each stand.  -AV       Row Name 02/06/24 1000          Sit-Stand Transfer    Sit-Stand [place] (Transfers) maximum assist (25% patient effort)  -AV     Assistive Device (Sit-Stand Transfers) walker, front-wheeled  -AV       Row Name 02/06/24 1000          Stand-Sit Transfer    Stand-Sit [place] (Transfers) maximum assist (25% patient effort)  -AV     Assistive Device (Stand-Sit Transfers) walker, front-wheeled  -AV       Row Name 02/06/24 1000          Gait/Stairs (Locomotion)    Patient was able to Ambulate no, other medical factors prevent ambulation  -AV     Reason Patient was unable to Ambulate Excessive Weakness  -AV       Row Name 02/06/24 1000          Safety Issues, Functional Mobility    Safety Issues Affecting Function (Mobility) insight into deficits/self-awareness;sequencing abilities  -AV     Impairments Affecting Function (Mobility) balance;cognition;endurance/activity tolerance;pain;strength  -AV       Row Name 02/06/24 1000          Balance    Balance Assessment standing static balance  -AV     Static Standing Balance maximum assist;1-person assist  -AV     Position/Device Used, Standing Balance supported;walker, front-wheeled  -AV       Row Name             Wound Right anterior greater trochanter Incision    Wound - Properties Group Side: Right  -EM Orientation: anterior  -EM Location: greater trochanter  -EM Primary Wound Type: Incision  -EM    Retired Wound - Properties Group Side: Right  -EM Orientation: anterior  -EM Location: greater trochanter  -EM Primary Wound Type: Incision  -EM    Retired Wound - Properties Group Side: Right  -EM Location: greater trochanter  -EM Primary Wound Type: Incision  -EM      Row Name 02/06/24 1000          Plan of Care Review    Plan of Care Reviewed With patient  -AV     Progress no change  -AV     Outcome Evaluation Patient  presents with deficits in balance, strength, transfers, and ambulation. Patient will benefit from skilled PT services to address these mobility deficits and decrease risk of falls.  -AV       Row Name 02/06/24 1000          Positioning and Restraints    Pre-Treatment Position in bed  -AV     Post Treatment Position bed  -AV     In Bed supine;call light within reach;encouraged to call for assist;exit alarm on;with family/caregiver;with nsg  -AV       Row Name 02/06/24 1000          Therapy Assessment/Plan (PT)    Rehab Potential (PT) good, to achieve stated therapy goals  -AV     Criteria for Skilled Interventions Met (PT) yes;meets criteria  -AV     Therapy Frequency (PT) daily  -AV     Predicted Duration of Therapy Intervention (PT) 10 days  -AV     Problem List (PT) problems related to;balance;cognition;mobility;strength;pain  -AV     Activity Limitations Related to Problem List (PT) unable to ambulate safely;unable to transfer safely  -AV       Row Name 02/06/24 1000          PT Evaluation Complexity    History, PT Evaluation Complexity 1-2 personal factors and/or comorbidities  -AV     Examination of Body Systems (PT Eval Complexity) total of 4 or more elements  -AV     Clinical Presentation (PT Evaluation Complexity) stable  -AV     Clinical Decision Making (PT Evaluation Complexity) low complexity  -AV     Overall Complexity (PT Evaluation Complexity) low complexity  -AV       Row Name 02/06/24 1000          Therapy Plan Review/Discharge Plan (PT)    Therapy Plan Review (PT) evaluation/treatment results reviewed;patient  -AV       Row Name 02/06/24 1000          Physical Therapy Goals    Bed Mobility Goal Selection (PT) bed mobility, PT goal 1  -AV     Transfer Goal Selection (PT) transfer, PT goal 1  -AV     Gait Training Goal Selection (PT) gait training, PT goal 1  -AV       Row Name 02/06/24 1000          Bed Mobility Goal 1 (PT)    Activity/Assistive Device (Bed Mobility Goal 1, PT) sit to supine/supine  to sit  -AV     [place] Level/Cues Needed (Bed Mobility Goal 1, PT) minimum assist (75% or more patient effort)  -AV     Time Frame (Bed Mobility Goal 1, PT) 10 days  -AV       Row Name 02/06/24 1000          Transfer Goal 1 (PT)    Activity/Assistive Device (Transfer Goal 1, PT) sit-to-stand/stand-to-sit;bed-to-chair/chair-to-bed;walker, rolling  -AV     [place] Level/Cues Needed (Transfer Goal 1, PT) minimum assist (75% or more patient effort)  -AV     Time Frame (Transfer Goal 1, PT) 10 days  -AV       Row Name 02/06/24 1000          Gait Training Goal 1 (PT)    Activity/Assistive Device (Gait Training Goal 1, PT) gait (walking locomotion);assistive device use;walker, rolling  -AV     [place] Level (Gait Training Goal 1, PT) minimum assist (75% or more patient effort)  -AV     Distance (Gait Training Goal 1, PT) 50  -AV     Time Frame (Gait Training Goal 1, PT) 10 days  -AV               User Key  (r) = Recorded By, (t) = Taken By, (c) = Cosigned By      Initials Name Provider Type    AV Simeon Salinas, PT Physical Therapist    Andrade Tinoco, RN Registered Nurse                    Physical Therapy Education       Title: PT OT SLP Therapies (In Progress)       Topic: Physical Therapy (In Progress)       Point: Mobility training (In Progress)       Learning Progress Summary             Patient Acceptance, E,D, NR by AV at 2/6/2024 1458                         Point: Home exercise program (Not Started)       Learner Progress:  Not documented in this visit.              Point: Body mechanics (In Progress)       Learning Progress Summary             Patient Acceptance, E,D, NR by AV at 2/6/2024 1458                         Point: Precautions (In Progress)       Learning Progress Summary             Patient Acceptance, E,D, NR by AV at 2/6/2024 1458                                         User Key       Initials Effective Dates Name Provider Type Discipline     06/11/21 -  Simeon Salinas, PT  Physical Therapist PT                  PT Recommendation and Plan  Anticipated Discharge Disposition (PT): inpatient rehabilitation facility  Planned Therapy Interventions (PT): balance training, bed mobility training, gait training, home exercise program, neuromuscular re-education, strengthening, transfer training  Therapy Frequency (PT): daily  Plan of Care Reviewed With: patient  Progress: no change  Outcome Evaluation: Patient presents with deficits in balance, strength, transfers, and ambulation. Patient will benefit from skilled PT services to address these mobility deficits and decrease risk of falls.   Outcome Measures       Row Name 02/06/24 1400             How much help from another person do you currently need...    Turning from your back to your side while in flat bed without using bedrails? 2  -AV      Moving from lying on back to sitting on the side of a flat bed without bedrails? 2  -AV      Moving to and from a bed to a chair (including a wheelchair)? 2  -AV      Standing up from a chair using your arms (e.g., wheelchair, bedside chair)? 2  -AV      Climbing 3-5 steps with a railing? 1  -AV      To walk in hospital room? 1  -AV      AM-PAC 6 Clicks Score (PT) 10  -AV      Highest Level of Mobility Goal 4 --> Transfer to chair/commode  -AV         Functional Assessment    Outcome Measure Options AM-PAC 6 Clicks Basic Mobility (PT)  -AV                User Key  (r) = Recorded By, (t) = Taken By, (c) = Cosigned By      Initials Name Provider Type    Simeon Christensen, PT Physical Therapist                     Time Calculation:    PT Charges       Row Name 02/06/24 1456             Time Calculation    PT Received On 02/06/24  -AV      PT Goal Re-Cert Due Date 02/15/24  -AV         Untimed Charges    PT Eval/Re-eval Minutes 50  -AV         Total Minutes    Untimed Charges Total Minutes 50  -AV       Total Minutes 50  -AV                User Key  (r) = Recorded By, (t) = Taken By, (c) = Cosigned By       Initials Name Provider Type    AV Simeon Salinas, PT Physical Therapist                  Therapy Charges for Today       Code Description Service Date Service Provider Modifiers Qty    [id number] HC PT EVAL LOW COMPLEXITY 4 2/6/2024 Simeon Salinas, PT GP 1            PT G-Codes  Outcome Measure Options: AM-PAC 6 Clicks Basic Mobility (PT)  AM-PAC 6 Clicks Score (PT): 10    Simeon Salinas, BIJU  2/6/2024

## 2024-02-06 NOTE — PROGRESS NOTES
[facility]     Progress Note    Patient Name: Maria Del Rosario Colmenares  : 1945  MRN: [id number]  Primary Care Physician:  Annmarie David APRN  Date of admission: 2024    Subjective   Subjective     HPI:  Patient Reports doing pretty well this morning.  Her pain is controlled.  She denies any chest pain or shortness of air.    Review of Systems   See HPI    Objective   Objective     Vitals:   Temp:  [97.9 °F (36.6 °C)-99.9 °F (37.7 °C)] 98.1 °F (36.7 °C)  Heart Rate:  [] 77  Resp:  [14-16] 14  BP: ()/() 133/99  Flow (L/min):  [3] 3  FiO2 (%):  [57 %-99 %] 99 %  Physical Exam    General: Alert, no acute distress   Chest: Unlabored breathing, cardiovascular: Regular heart rate   Musculoskeletal: Neurovascular active extremity.  Dressing intact.  Positive pulses.    Result Review      WBC   Date Value Ref Range Status   2024 8.90 3.40 - 10.80 10*3/mm3 Final     RBC   Date Value Ref Range Status   2024 3.48 (L) 3.77 - 5.28 10*6/mm3 Final     Hemoglobin   Date Value Ref Range Status   2024 10.4 (L) 12.0 - 15.9 g/dL Final     Hematocrit   Date Value Ref Range Status   2024 32.4 (L) 34.0 - 46.6 % Final     MCV   Date Value Ref Range Status   2024 93.1 79.0 - 97.0 fL Final     MCH   Date Value Ref Range Status   2024 29.9 26.6 - 33.0 pg Final     MCHC   Date Value Ref Range Status   2024 32.1 31.5 - 35.7 g/dL Final     RDW   Date Value Ref Range Status   2024 13.0 12.3 - 15.4 % Final     RDW-SD   Date Value Ref Range Status   2024 44.0 37.0 - 54.0 fl Final     MPV   Date Value Ref Range Status   2024 11.0 6.0 - 12.0 fL Final     Platelets   Date Value Ref Range Status   2024 145 140 - 450 10*3/mm3 Final     Neutrophil %   Date Value Ref Range Status   2024 76.7 (H) 42.7 - 76.0 % Final     Lymphocyte %   Date Value Ref Range Status   2024 11.6 (L) 19.6 - 45.3 % Final     Monocyte %   Date Value Ref Range Status    02/05/2024 10.8 5.0 - 12.0 % Final     Eosinophil %   Date Value Ref Range Status   02/05/2024 0.0 (L) 0.3 - 6.2 % Final     Basophil %   Date Value Ref Range Status   02/05/2024 0.2 0.0 - 1.5 % Final     Immature Grans %   Date Value Ref Range Status   02/05/2024 0.7 (H) 0.0 - 0.5 % Final     Neutrophils, Absolute   Date Value Ref Range Status   02/05/2024 9.20 (H) 1.70 - 7.00 10*3/mm3 Final     Lymphocytes, Absolute   Date Value Ref Range Status   02/05/2024 1.39 0.70 - 3.10 10*3/mm3 Final     Monocytes, Absolute   Date Value Ref Range Status   02/05/2024 1.30 (H) 0.10 - 0.90 10*3/mm3 Final     Eosinophils, Absolute   Date Value Ref Range Status   02/05/2024 0.00 0.00 - 0.40 10*3/mm3 Final     Basophils, Absolute   Date Value Ref Range Status   02/05/2024 0.03 0.00 - 0.20 10*3/mm3 Final     Immature Grans, Absolute   Date Value Ref Range Status   02/05/2024 0.09 (H) 0.00 - 0.05 10*3/mm3 Final     nRBC   Date Value Ref Range Status   02/05/2024 0.0 0.0 - 0.2 /100 WBC Final        Result Review:  I have personally reviewed the results from the time of this admission to 2/6/2024 12:36 EST and agree with these findings:  [x]  Laboratory  []  Microbiology  [x]  Radiology  []  EKG/Telemetry   []  Cardiology/Vascular   []  Pathology  []  Old records  []  Other:      Assessment & Plan   Assessment / Plan     Brief Patient Summary:  Maria Del Rosario Colmenares is a 78 y.o. female who is status post right femur cephalomedullary nailing    Active Hospital Problems:  Active Hospital Problems    Diagnosis     **Fracture     Closed 2-part intertrochanteric fracture of proximal end of right femur      Plan: Weightbearing as tolerated with walker  Physical and Occupational Therapy  Pain control  DVT prophylaxis: May resume home Eliquis  Discharge planning: Inpatient rehab when able       DVT prophylaxis:  Medical and mechanical DVT prophylaxis orders are present.        CODE STATUS:   Level Of Support Discussed With: Patient  Code Status  (Patient has no pulse and is not breathing): CPR (Attempt to Resuscitate)  Medical Interventions (Patient has pulse or is breathing): Full Support    Disposition:  I expect patient to be discharged to inpatient rehab when able.    Electronically signed by Adrien Ordaz MD, 02/06/24, 12:36 PM EST.

## 2024-02-06 NOTE — PROGRESS NOTES
[facility]     Cardiology Progress Note    Patient Name: Maria Del Rosario Colmenares  : 1945  MRN: [id number]  Primary Care Physician:  Annmarie David APRN  Date of admission: 2024    Subjective   Subjective     Chief Complaint: Follow-up atrial fibrillation, right hip fracture    Interval HPI:    Patient underwent surgical repair of right hip fracture yesterday without complications.  This morning, she is reporting some hip pain.  She remains in A-fib with moderately controlled ventricular rates, on Cardizem infusion.  Denies any chest pain or palpitations.    Review of Systems   All systems were reviewed and negative except for: Right hip pain.  Negative for chest pain palpitations shortness of breath    Objective   Objective     Vitals:   Temp:  [97.9 °F (36.6 °C)-99.9 °F (37.7 °C)] 98.1 °F (36.7 °C)  Heart Rate:  [] 77  Resp:  [14-16] 14  BP: ()/() 133/99  Flow (L/min):  [3] 3  FiO2 (%):  [57 %-99 %] 99 %  Physical Exam      General : Alert, awake, no acute distress  CVS : Tachycardic, irregular no murmur, rubs or gallops  Lungs: Clear to auscultation bilaterally, no crackles or rhonchi  Abdomen: Soft, nontender, bowel sounds heard in all 4 quadrants  Extremities: Warm, well-perfused, no pedal edema    Scheduled Meds:apixaban, 5 mg, Oral, Q12H  [Held by provider] furosemide, 20 mg, Oral, Daily  insulin regular, 2-7 Units, Subcutaneous, 4x Daily AC & at Bedtime  levothyroxine, 75 mcg, Oral, Q AM  metoprolol succinate XL, 50 mg, Oral, Q12H  rosuvastatin, 20 mg, Oral, Nightly  senna-docusate sodium, 2 tablet, Oral, BID  sodium chloride, 10 mL, Intravenous, Q12H      Continuous Infusions:dilTIAZem, 5-15 mg/hr, Last Rate: 5 mg/hr (24 0635)           Result Review    Result Review:  I have personally reviewed the results from the time of this admission to 2024 12:50 EST and agree with these findings:  [x]  Laboratory  []  Microbiology  [x]  Radiology  [x]  EKG/Telemetry   [x]   Cardiology/Vascular   []  Pathology  []  Old records  []  Other:  Most notable findings include:     CBC          2/4/2024    13:34 2/5/2024    04:18 2/6/2024    04:06   CBC   WBC 14.33  12.01  8.90    RBC 4.03  3.86  3.48    Hemoglobin 12.1  11.4  10.4    Hematocrit 36.6  35.3  32.4    MCV 90.8  91.5  93.1    MCH 30.0  29.5  29.9    MCHC 33.1  32.3  32.1    RDW 13.2  13.2  13.0    Platelets 196  169  145      CMP          2/4/2024    13:34 2/5/2024    04:18 2/6/2024    04:06   CMP   Glucose 256  185  165    BUN 24  27  26    Creatinine 1.06  0.95  0.82    EGFR 53.9  61.5  73.3    Sodium 139  134  136    Potassium 3.8  4.8  4.8    Chloride 100  100  102    Calcium 9.3  9.0  8.6    Total Protein 6.7      Albumin 4.1      Globulin 2.6      Total Bilirubin 0.9      Alkaline Phosphatase 86      AST (SGOT) 61      ALT (SGPT) 34      Albumin/Globulin Ratio 1.6      BUN/Creatinine Ratio 22.6  28.4  31.7    Anion Gap 14.6  10.6  10.8       CARDIAC LABS:      Lab 02/04/24  1620 02/04/24  1334   PROBNP  --  15,823.0*   HSTROP T 73* 82*        Assessment & Plan   Assessment / Plan     Brief Patient Summary:  Maria Del Rosario Colmenares is a 78 y.o. female with  paroxysmal atrial fibrillation, hypothyroidism.  She presented to the emergency room yesterday after sustaining a mechanical fall at home.  She is admitted for intertrochanteric fracture of the right femur.  Also noted to be in atrial fibrillation with RVR.    Active Hospital Problems:  Active Hospital Problems    Diagnosis     **Fracture     Closed 2-part intertrochanteric fracture of proximal end of right femur      Paroxysmal atrial fibrillation : Remains in atrial fibrillation with moderately controlled ventricular rates, on Cardizem infusion     Elevated troponin : High sensitive troponins mildly elevated.  Total CPK significantly elevated with a mild elevation in CK-MB.  This is consistent with rhabdomyolysis, unlikely to be related to acute coronary syndrome.  She is chest  pain-free.     Mitral regurgitation : Per previous echocardiogram done in 2021     Right hip fracture ; status post surgery      Plan:     Increase metoprolol succinate to 50 mg twice daily  Wean off Cardizem drip as tolerated today  Restart Eliquis 5 mg twice daily today : Discussed with Dr. Ordaz       CODE STATUS:   Level Of Support Discussed With: Patient  Code Status (Patient has no pulse and is not breathing): CPR (Attempt to Resuscitate)  Medical Interventions (Patient has pulse or is breathing): Full Support      Electronically signed by Jermaine Powell MD, 02/06/24, 12:50 PM EST.

## 2024-02-06 NOTE — PLAN OF CARE
Goal Outcome Evaluation:  Plan of Care Reviewed With: patient        Progress: no change  Outcome Evaluation: Patient presents with deficits in balance, strength, transfers, and ambulation. Patient will benefit from skilled PT services to address these mobility deficits and decrease risk of falls.      Anticipated Discharge Disposition (PT): inpatient rehabilitation facility

## 2024-02-06 NOTE — SIGNIFICANT NOTE
02/06/24 1000   Plan   Plan ALPHONSE RN met with friend Мария and patient at bedside.  Patient lives alone and is able to provide own IADL's.  Мария is good support for patient when needed.  Daughter lives in [place]. Reports no financial needs.  Facesheet verified.  Patient is agreeable to [facility].  Referral sent.  Will continue to monitor for possible discharge needs.

## 2024-02-06 NOTE — PROGRESS NOTES
[facility]   Hospitalist Progress Note  Date: 2024  Patient Name: Maria Del Rosario Colmenares  : 1945  MRN: [id number]  Date of admission: 2024  Consultants:   -Cardiology: Dr. Jermaine Powell  -Orthopedic Surgery: Dr. Adrien Ordaz    Subjective   Subjective     Chief Complaint: Fall with right lower extremity and left shoulder pain    Summary:   Maria Del Rosario Colmenares is a 78 y.o. female with chronic paroxysmal atrial fibrillation on Eliquis (last dose 2024), hypothyroidism and hyperlipidemia that presented to the ED after mechanical fall.  Patient was found to have a right intertrochanteric proximal femur fracture.  Hospitalist service contacted for further evaluation and management.  Orthopedic surgery consulted.  Patient was noted to be in A-fib with RVR, she was given IV dose of Cardizem but heart rate remained elevated.  Cardiology consulted to assist in care.  Cardizem drip started.    Interval Followup:   Patient had right hip trochanteric nailing with intramedullary hip screw done yesterday and tolerated well.  Continues on Cardizem drip this morning.  Patient has removed received as needed Norco for pain management.  She denies any chest pain or shortness of breath.  Nursing with no additional acute issues to report.    Procedures:  -Right hip trochanteric nailing with intramedullary hip screw (2024)    Pain Medication:   -Norco  -IV Dilaudid    Objective   Objective     Vitals:   Temp:  [97.9 °F (36.6 °C)-99.9 °F (37.7 °C)] 98.1 °F (36.7 °C)  Heart Rate:  [] 77  Resp:  [14-16] 14  BP: ()/() 133/99  Flow (L/min):  [3] 3  FiO2 (%):  [57 %-99 %] 99 %  Physical Exam   Gen: No acute distress, sitting up in bed, pleasant, conversant  Resp: CTAB, No w/r/r, good aeration, equal chest rise bilaterally  Card: IRIR, systolic murmur appreciated  Abd: Soft, Nontender, Nondistended, + bowel sounds    Result Review    Result Review:  I have personally reviewed the results as below and  agree with these findings:  []  Laboratory:   CMP          2/4/2024    13:34 2/5/2024    04:18 2/6/2024    04:06   CMP   Glucose 256  185  165    BUN 24  27  26    Creatinine 1.06  0.95  0.82    EGFR 53.9  61.5  73.3    Sodium 139  134  136    Potassium 3.8  4.8  4.8    Chloride 100  100  102    Calcium 9.3  9.0  8.6    Total Protein 6.7      Albumin 4.1      Globulin 2.6      Total Bilirubin 0.9      Alkaline Phosphatase 86      AST (SGOT) 61      ALT (SGPT) 34      Albumin/Globulin Ratio 1.6      BUN/Creatinine Ratio 22.6  28.4  31.7    Anion Gap 14.6  10.6  10.8      CBC          2/4/2024    13:34 2/5/2024    04:18 2/6/2024    04:06   CBC   WBC 14.33  12.01  8.90    RBC 4.03  3.86  3.48    Hemoglobin 12.1  11.4  10.4    Hematocrit 36.6  35.3  32.4    MCV 90.8  91.5  93.1    MCH 30.0  29.5  29.9    MCHC 33.1  32.3  32.1    RDW 13.2  13.2  13.0    Platelets 196  169  145    Magnesium and phosphorus within normal limits  []  Microbiology:   []  Radiology:   [x]  EKG/Telemetry:    []  Cardiology/Vascular:    []  Pathology:  []  Old records:  []  Other:    Assessment & Plan   Assessment / Plan     Assessment:  Closed 2 part intertrochanteric fracture proximal end of right femur, initial encounter s/p right hip trochanteric nailing with intramedullary hip screw  Chronic paroxysmal atrial fibrillation on Eliquis as an outpatient, acutely in RVR  Elevated troponin  Hypothyroidism  Hyperlipidemia  Mitral regurgitation    Plan:  -Cardiology and Orthopedic Surgery consulted and following, appreciate assistance and recommendations in the care of this patient.  -Continue Cardizem drip.  Wean as tolerated.  Patient started on oral metoprolol.  -Restart home apixaban  -PT/OT consulted  -Pain management with as needed Norco  -Management discussed with Orthopedic Surgery.  Okay with resuming patient's home apixaban.  -Continue levothyroxine  -Monitor electrolytes and renal function with BMP and magnesium level in the AM  -Monitor  WBC and Hgb with CBC in the AM  -Clinical course will dictate further management     DVT Prophylaxis: Apixaban  Diet: Regular  Dispo: PT/OT consulted  Code Status: Full code     Personally reviewed patients labs and imaging, discussed with patient and nurse at bedside. Discussed management with the following consultants: Cardiology and Orthopedic Surgery.     Part of this note may be an electronic transcription/translation of spoken language to printed text using the Dragon dictation system.    DVT prophylaxis:  Medical and mechanical DVT prophylaxis orders are present.        CODE STATUS:   Level Of Support Discussed With: Patient  Code Status (Patient has no pulse and is not breathing): CPR (Attempt to Resuscitate)  Medical Interventions (Patient has pulse or is breathing): Full Support      Electronically signed by Elvin Bales MD, 02/06/24, 12:58 PM EST.

## 2024-02-06 NOTE — PLAN OF CARE
Goal Outcome Evaluation:  Plan of Care Reviewed With: patient        Progress: no change  Outcome Evaluation: Vitals stable during shift. No complalints of pain. Maintained cardizem drip at 5 mLs/hr. Patient refused staff-assisted turns during shift, but shifted her weight independently. Patient educated on importance of shifting weight to prevent pressure injuries. Genaro Arechiga RN

## 2024-02-06 NOTE — PLAN OF CARE
Goal Outcome Evaluate    Barr removed at 1830, patient assisted in chair, pain managed throughout shift, patient on cardizem drip, patient 97 room air, BP pulse and resp, monitored throughout the shift

## 2024-02-06 NOTE — THERAPY EVALUATION
Patient Name: Maria Del Rosario Colmenares  : 1945    MRN: [id number]                              Today's Date: 2024       Admit Date: 2024    Visit Dx:     ICD-10-CM ICD-9-CM   1. Fracture  T14.8XXA 829.0   2. Closed 2-part intertrochanteric fracture of right femur, initial encounter  S72.141A 820.21   3. Difficulty walking  R26.2 719.7   4. Decreased activities of daily living (ADL)  Z78.9 V49.89     Patient Active Problem List   Diagnosis    Nonrheumatic mitral valve regurgitation    Mixed hyperlipidemia    Chronic diastolic heart failure    Longstanding persistent atrial fibrillation    Stroke    Fracture    Closed 2-part intertrochanteric fracture of proximal end of right femur     Past Medical History:   Diagnosis Date    Arthritis     Chronic combined systolic (congestive) and diastolic (congestive) heart failure     Hypothyroidism     Mixed hyperlipidemia     Paroxysmal atrial fibrillation     SOB (shortness of breath)     Thyroid disorder      Past Surgical History:   Procedure Laterality Date    ABDOMINAL HYSTERECTOMY      APPENDECTOMY      COLONOSCOPY      HIP TROCHANTERIC NAILING WITH INTRAMEDULLARY HIP SCREW Right 2024    Procedure: HIP TROCHANTERIC NAILING WITH INTRAMEDULLARY HIP SCREW;  Surgeon: Adrien Ordaz MD;  Location: [facility] OR;  Service: Orthopedics;  Laterality: Right;    HYSTERECTOMY        General Information       Row Name 24 1451          OT Time and Intention    Document Type evaluation  -LF     Mode of Treatment individual therapy;occupational therapy  -LF       Row Name 24 1451          General Information    Patient Profile Reviewed yes  -LF     Prior Level of Function --  (I) with ADLs, ambulated w/o a device, has a step over tub where she stands to shower, and no home O2.  -LF     Existing Precautions/Restrictions fall;weight bearing  WBAT RLE  -LF     Barriers to Rehab none identified  -LF       Row Name 24 1451          Occupational Profile     Reason for Services/Referral (Occupational Profile) Patient is a 78 year old male who is currently status post right hip trochanteric nailing with intramedullary hip screw on February 5th, 2024. Occupational therapy consulted due to recent decline in ADLs/functional transfers. No previous occupational therapy services for current condition.  -       Row Name 02/06/24 1451          Living Environment    People in Home alone  Daughter and friend able to assist at d/c  -[facility] Name 02/06/24 1451          Cognition    Orientation Status (Cognition) oriented to;person  pleasantly confused  -       Row Name 02/06/24 1451          Safety Issues, Functional Mobility    Safety Issues Affecting Function (Mobility) insight into deficits/self-awareness  -     Impairments Affecting Function (Mobility) balance;cognition;endurance/activity tolerance;pain;strength  -               User Key  (r) = Recorded By, (t) = Taken By, (c) = Cosigned By      Initials Name Provider Type     Kathia Valdovinos OT Occupational Therapist                     Mobility/ADL's       [facility] Name 02/06/24 1453          Bed Mobility    Comment, (Bed Mobility) Patient declined bed mobility and functional transfers, completing them with PT prior OT's arrival, requiring max assist/dep per PT.  -       Row Name 02/06/24 1453          Activities of Daily Living    BADL Assessment/Intervention bathing;upper body dressing;lower body dressing;grooming;feeding;toileting  -[facility] Name 02/06/24 1453          Mobility    Extremity Weight-bearing Status right lower extremity  WBAT  -[facility] Name 02/06/24 1453          Bathing Assessment/Intervention    [place] Level (Bathing) bathing skills;upper body;standby assist;lower body;maximum assist (25% patient effort);dependent (less than 25% patient effort)  -       Row Name 02/06/24 1453          Upper Body Dressing Assessment/Training    [place] Level (Upper Body Dressing) upper body  dressing skills;standby assist  -[facility] Name 02/06/24 1453          Lower Body Dressing Assessment/Training    [place] Level (Lower Body Dressing) lower body dressing skills;maximum assist (25% patient effort);dependent (less than 25% patient effort)  -       Row Name 02/06/24 1453          Grooming Assessment/Training    [place] Level (Grooming) grooming skills;standby assist  -[facility] Name 02/06/24 1453          Self-Feeding Assessment/Training    [place] Level (Feeding) feeding skills;set up  -[facility] Name 02/06/24 1453          Toileting Assessment/Training    [place] Level (Toileting) toileting skills;dependent (less than 25% patient effort)  -     Comment, (Toileting) Barr catheter currently in place.  -               User Key  (r) = Recorded By, (t) = Taken By, (c) = Cosigned By      Initials Name Provider Type     Kathia Valdovinos OT Occupational Therapist                   Obj/Interventions       [facility] Name 02/06/24 1455          Sensory Assessment (Somatosensory)    Sensory Assessment (Somatosensory) UE sensation intact  -LF       Row Name 02/06/24 1455          Vision Assessment/Intervention    Visual Impairment/Limitations WFL  -[facility] Name 02/06/24 1455          Range of Motion Comprehensive    General Range of Motion bilateral upper extremity ROM WFL  -[facility] Name 02/06/24 1455          Strength Comprehensive (MMT)    Comment, General Manual Muscle Testing (MMT) Assessment 4-/5 BUEs  -[facility] Name 02/06/24 1455          Motor Skills    Motor Skills coordination;functional endurance  -LF     Coordination bilateral;upper extremity;WFL  -LF     Functional Endurance Fair-  -[facility] Name 02/06/24 1455          Balance    Comment, Balance Not tested, likely impaired.  -               User Key  (r) = Recorded By, (t) = Taken By, (c) = Cosigned By      Initials Name Provider Type    Kathia Blue OT Occupational Therapist                    Goals/Plan       Row Name 02/06/24 1457          Bed Mobility Goal 1 (OT)    Activity/Assistive Device (Bed Mobility Goal 1, OT) bed mobility activities, all  -LF     [place] Level/Cues Needed (Bed Mobility Goal 1, OT) minimum assist (75% or more patient effort)  -LF     Time Frame (Bed Mobility Goal 1, OT) long term goal (LTG);10 days  -       Row Name 02/06/24 1457          Transfer Goal 1 (OT)    Activity/Assistive Device (Transfer Goal 1, OT) transfers, all  -LF     [place] Level/Cues Needed (Transfer Goal 1, OT) minimum assist (75% or more patient effort)  -LF     Time Frame (Transfer Goal 1, OT) long term goal (LTG);10 days  -       Row Name 02/06/24 1457          Bathing Goal 1 (OT)    Activity/Device (Bathing Goal 1, OT) bathing skills, all  -LF     [place] Level/Cues Needed (Bathing Goal 1, OT) minimum assist (75% or more patient effort)  -LF     Time Frame (Bathing Goal 1, OT) long term goal (LTG);10 days  -       Row Name 02/06/24 1457          Dressing Goal 1 (OT)    Activity/Device (Dressing Goal 1, OT) dressing skills, all  -LF     [place]/Cues Needed (Dressing Goal 1, OT) minimum assist (75% or more patient effort)  -LF     Time Frame (Dressing Goal 1, OT) long term goal (LTG);10 days  -       Row Name 02/06/24 1457          Toileting Goal 1 (OT)    Activity/Device (Toileting Goal 1, OT) toileting skills, all  -LF     [place] Level/Cues Needed (Toileting Goal 1, OT) minimum assist (75% or more patient effort)  -LF     Time Frame (Toileting Goal 1, OT) long term goal (LTG);10 days  -       Row Name 02/06/24 1457          Grooming Goal 1 (OT)    Activity/Device (Grooming Goal 1, OT) grooming skills, all  -LF     [place] (Grooming Goal 1, OT) set-up required  -LF     Time Frame (Grooming Goal 1, OT) long term goal (LTG);10 days  -       Row Name 02/06/24 1457          Strength Goal 1 (OT)    Strength Goal 1 (OT) Patient will increase BUE strength by 1/2  grade to support ADLs/functional transfers.  -LF     Time Frame (Strength Goal 1, OT) long term goal (LTG);10 days  -       Row Name 02/06/24 1459          Problem Specific Goal 1 (OT)    Problem Specific Goal 1 (OT) Patient will demonstrate fair+ endurance to support ADLs/functional transfers.  -LF     Time Frame (Problem Specific Goal 1, OT) long term goal (LTG);10 days  -       Row Name 02/06/24 1459          Therapy Assessment/Plan (OT)    Planned Therapy Interventions (OT) activity tolerance training;BADL retraining;functional balance retraining;occupation/activity based interventions;patient/caregiver education/training;strengthening exercise;transfer/mobility retraining  -               User Key  (r) = Recorded By, (t) = Taken By, (c) = Cosigned By      Initials Name Provider Type     Kathia Valdovinos, OT Occupational Therapist                   Clinical Impression       Row Name 02/06/24 1453          Pain Assessment    Additional Documentation Pain Scale: FACES Pre/Post-Treatment (Group)  -       Row Name 02/06/24 1458          Pain Scale: FACES Pre/Post-Treatment    Pain: FACES Scale, Pretreatment 6-->hurts even more  -     Posttreatment Pain Rating 6-->hurts even more  -       Row Name 02/06/24 1453          Plan of Care Review    Plan of Care Reviewed With patient  -     Progress no change  -     Outcome Evaluation Patient presents with limitations in self-care, functional transfers, balance, endurance, and BUE strength. She would benefit from continued skilled occupational therapy services to maximize independence with ADLs/functional transfers.  -       Row Name 02/06/24 145          Therapy Assessment/Plan (OT)    Patient/Family Therapy Goal Statement (OT) To maximize independence.  -LF     Rehab Potential (OT) good, to achieve stated therapy goals  -     Criteria for Skilled Therapeutic Interventions Met (OT) yes;meets criteria;skilled treatment is necessary  -     Therapy  Frequency (OT) 5 times/wk  -LF       Row Name 02/06/24 1456          Therapy Plan Review/Discharge Plan (OT)    Equipment Needs Upon Discharge (OT) walker, rolling;tub bench;commode chair  -LF     Anticipated Discharge Disposition (OT) skilled nursing facility  -       Row Name 02/06/24 1456          Vital Signs    O2 Delivery Pre Treatment nasal cannula  -LF     O2 Delivery Intra Treatment nasal cannula  -LF     O2 Delivery Post Treatment nasal cannula  -LF       Row Name 02/06/24 1456          Positioning and Restraints    Pre-Treatment Position in bed  -LF     Post Treatment Position bed  -LF     In Bed fowlers;call light within reach;encouraged to call for assist;exit alarm on  -               User Key  (r) = Recorded By, (t) = Taken By, (c) = Cosigned By      Initials Name Provider Type    LF Kathia Valdovinos, MARIA INES Occupational Therapist                   Outcome Measures       Row Name 02/06/24 1458          How much help from another is currently needed...    Putting on and taking off regular lower body clothing? 1  -LF     Bathing (including washing, rinsing, and drying) 2  -LF     Toileting (which includes using toilet bed pan or urinal) 1  -LF     Putting on and taking off regular upper body clothing 3  -LF     Taking care of personal grooming (such as brushing teeth) 3  -LF     Eating meals 4  -LF     AM-PAC 6 Clicks Score (OT) 14  -       Row Name 02/06/24 1400 02/06/24 0825       How much help from another person do you currently need...    Turning from your back to your side while in flat bed without using bedrails? 2  -AV 2  -TD    Moving from lying on back to sitting on the side of a flat bed without bedrails? 2  -AV 2  -TD    Moving to and from a bed to a chair (including a wheelchair)? 2  -AV 2  -TD    Standing up from a chair using your arms (e.g., wheelchair, bedside chair)? 2  -AV 2  -TD    Climbing 3-5 steps with a railing? 1  -AV 1  -TD    To walk in hospital room? 1  -AV 1  -TD    AM-PAC  6 Clicks Score (PT) 10  -AV 10  -TD    Highest Level of Mobility Goal 4 --> Transfer to chair/commode  -AV 4 --> Transfer to chair/commode  -TD      Row Name 02/06/24 1458 02/06/24 1400       Functional Assessment    Outcome Measure Options AM-PAC 6 Clicks Daily Activity (OT);Optimal Instrument  -LF AM-PAC 6 Clicks Basic Mobility (PT)  -AV      Row Name 02/06/24 1458          Optimal Instrument    Optimal Instrument Optimal - 3  -LF     Bending/Stooping 4  -LF     Standing 4  -LF     Reaching 1  -LF     From the list, choose the 3 activities you would most like to be able to do without any difficulty Bending/stooping;Standing;Reaching  -LF     Total Score Optimal - 3 9  -LF               User Key  (r) = Recorded By, (t) = Taken By, (c) = Cosigned By      Initials Name Provider Type    LF Kathia Valdovinos, OT Occupational Therapist    Simeon Christensen, PT Physical Therapist    TD Mirela Pond, RN Registered Nurse                    Occupational Therapy Education       Title: PT OT SLP Therapies (In Progress)       Topic: Occupational Therapy (In Progress)       Point: ADL training (In Progress)       Description:   Instruct learner(s) on proper safety adaptation and remediation techniques during self care or transfers.   Instruct in proper use of assistive devices.                  Learning Progress Summary             Patient Acceptance, E,TB, NR by  at 2/6/2024 6622                         Point: Home exercise program (Not Started)       Description:   Instruct learner(s) on appropriate technique for monitoring, assisting and/or progressing therapeutic exercises/activities.                  Learner Progress:  Not documented in this visit.              Point: Precautions (In Progress)       Description:   Instruct learner(s) on prescribed precautions during self-care and functional transfers.                  Learning Progress Summary             Patient Acceptance, E,TB, NR by  at 2/6/2024 4962                          Point: Body mechanics (In Progress)       Description:   Instruct learner(s) on proper positioning and spine alignment during self-care, functional mobility activities and/or exercises.                  Learning Progress Summary             Patient Acceptance, E,TB, NR by  at 2/6/2024 1458                                         User Key       Initials Effective Dates Name Provider Type Discipline     06/16/21 -  Kathia Valdovinos, OT Occupational Therapist OT                  OT Recommendation and Plan  Planned Therapy Interventions (OT): activity tolerance training, BADL retraining, functional balance retraining, occupation/activity based interventions, patient/caregiver education/training, strengthening exercise, transfer/mobility retraining  Therapy Frequency (OT): 5 times/wk  Plan of Care Review  Plan of Care Reviewed With: patient  Progress: no change  Outcome Evaluation: Patient presents with limitations in self-care, functional transfers, balance, endurance, and BUE strength. She would benefit from continued skilled occupational therapy services to maximize independence with ADLs/functional transfers.     Time Calculation:   Evaluation Complexity (OT)  Review Occupational Profile/Medical/Therapy History Complexity: brief/low complexity  Assessment, Occupational Performance/Identification of Deficit Complexity: 3-5 performance deficits  Clinical Decision Making Complexity (OT): problem focused assessment/low complexity  Overall Complexity of Evaluation (OT): low complexity     Time Calculation- OT       Row Name 02/06/24 1458             Time Calculation- OT    OT Received On 02/06/24  -LF      OT Goal Re-Cert Due Date 02/15/24  -LF         Untimed Charges    OT Eval/Re-eval Minutes 34  -LF         Total Minutes    Untimed Charges Total Minutes 34  -LF       Total Minutes 34  -LF                User Key  (r) = Recorded By, (t) = Taken By, (c) = Cosigned By      Initials Name Provider Type      Kathia Valdovinos OT Occupational Therapist                  Therapy Charges for Today       Code Description Service Date Service Provider Modifiers Qty    [id number] HC OT EVAL LOW COMPLEXITY 3 2/6/2024 Kathia Valdovinos OT GO 1                 Kathia Valdovinos OT  2/6/2024

## 2024-02-06 NOTE — PLAN OF CARE
Goal Outcome Evaluation:  Plan of Care Reviewed With: patient        Progress: no change  Outcome Evaluation: Patient presents with limitations in self-care, functional transfers, balance, endurance, and BUE strength. She would benefit from continued skilled occupational therapy services to maximize independence with ADLs/functional transfers.      Anticipated Discharge Disposition (OT): skilled nursing facility

## 2024-02-07 LAB
ANION GAP SERPL CALCULATED.3IONS-SCNC: 11.4 MMOL/L (ref 5–15)
BUN SERPL-MCNC: 35 MG/DL (ref 8–23)
BUN/CREAT SERPL: 31.3 (ref 7–25)
CALCIUM SPEC-SCNC: 8.8 MG/DL (ref 8.6–10.5)
CHLORIDE SERPL-SCNC: 99 MMOL/L (ref 98–107)
CO2 SERPL-SCNC: 23.6 MMOL/L (ref 22–29)
CREAT SERPL-MCNC: 1.12 MG/DL (ref 0.57–1)
DEPRECATED RDW RBC AUTO: 43.8 FL (ref 37–54)
EGFRCR SERPLBLD CKD-EPI 2021: 50.4 ML/MIN/1.73
ERYTHROCYTE [DISTWIDTH] IN BLOOD BY AUTOMATED COUNT: 12.7 % (ref 12.3–15.4)
GLUCOSE BLDC GLUCOMTR-MCNC: 124 MG/DL (ref 70–99)
GLUCOSE BLDC GLUCOMTR-MCNC: 127 MG/DL (ref 70–99)
GLUCOSE BLDC GLUCOMTR-MCNC: 172 MG/DL (ref 70–99)
GLUCOSE SERPL-MCNC: 140 MG/DL (ref 65–99)
HCT VFR BLD AUTO: 31.6 % (ref 34–46.6)
HGB BLD-MCNC: 9.9 G/DL (ref 12–15.9)
MAGNESIUM SERPL-MCNC: 2.2 MG/DL (ref 1.6–2.4)
MCH RBC QN AUTO: 29.8 PG (ref 26.6–33)
MCHC RBC AUTO-ENTMCNC: 31.3 G/DL (ref 31.5–35.7)
MCV RBC AUTO: 95.2 FL (ref 79–97)
PLATELET # BLD AUTO: 190 10*3/MM3 (ref 140–450)
PMV BLD AUTO: 11.1 FL (ref 6–12)
POTASSIUM SERPL-SCNC: 4.3 MMOL/L (ref 3.5–5.2)
RBC # BLD AUTO: 3.32 10*6/MM3 (ref 3.77–5.28)
SODIUM SERPL-SCNC: 134 MMOL/L (ref 136–145)
WBC NRBC COR # BLD AUTO: 9.21 10*3/MM3 (ref 3.4–10.8)

## 2024-02-07 PROCEDURE — 97530 THERAPEUTIC ACTIVITIES: CPT

## 2024-02-07 PROCEDURE — 25810000003 SODIUM CHLORIDE 0.9 % SOLUTION

## 2024-02-07 PROCEDURE — 82948 REAGENT STRIP/BLOOD GLUCOSE: CPT

## 2024-02-07 PROCEDURE — 63710000001 INSULIN REGULAR HUMAN PER 5 UNITS

## 2024-02-07 PROCEDURE — 80048 BASIC METABOLIC PNL TOTAL CA: CPT | Performed by: INTERNAL MEDICINE

## 2024-02-07 PROCEDURE — 83735 ASSAY OF MAGNESIUM: CPT | Performed by: INTERNAL MEDICINE

## 2024-02-07 PROCEDURE — 99232 SBSQ HOSP IP/OBS MODERATE 35: CPT | Performed by: INTERNAL MEDICINE

## 2024-02-07 PROCEDURE — 85027 COMPLETE CBC AUTOMATED: CPT | Performed by: INTERNAL MEDICINE

## 2024-02-07 PROCEDURE — 97110 THERAPEUTIC EXERCISES: CPT

## 2024-02-07 RX ADMIN — DOCUSATE SODIUM 50MG AND SENNOSIDES 8.6MG 2 TABLET: 8.6; 5 TABLET, FILM COATED ORAL at 08:32

## 2024-02-07 RX ADMIN — ROSUVASTATIN CALCIUM 20 MG: 20 TABLET, FILM COATED ORAL at 20:35

## 2024-02-07 RX ADMIN — HYDROCODONE BITARTRATE AND ACETAMINOPHEN 1 TABLET: 7.5; 325 TABLET ORAL at 14:25

## 2024-02-07 RX ADMIN — APIXABAN 5 MG: 5 TABLET, FILM COATED ORAL at 08:32

## 2024-02-07 RX ADMIN — HYDROCODONE BITARTRATE AND ACETAMINOPHEN 2 TABLET: 7.5; 325 TABLET ORAL at 02:21

## 2024-02-07 RX ADMIN — METOPROLOL SUCCINATE 50 MG: 50 TABLET, EXTENDED RELEASE ORAL at 08:32

## 2024-02-07 RX ADMIN — METOPROLOL SUCCINATE 50 MG: 50 TABLET, EXTENDED RELEASE ORAL at 20:35

## 2024-02-07 RX ADMIN — SODIUM CHLORIDE 250 ML: 9 INJECTION, SOLUTION INTRAVENOUS at 03:24

## 2024-02-07 RX ADMIN — POLYETHYLENE GLYCOL 3350 17 G: 17 POWDER, FOR SOLUTION ORAL at 20:35

## 2024-02-07 RX ADMIN — LEVOTHYROXINE SODIUM 75 MCG: 75 TABLET ORAL at 05:15

## 2024-02-07 RX ADMIN — Medication 10 ML: at 20:36

## 2024-02-07 RX ADMIN — INSULIN HUMAN 2 UNITS: 100 INJECTION, SOLUTION PARENTERAL at 17:16

## 2024-02-07 RX ADMIN — APIXABAN 5 MG: 5 TABLET, FILM COATED ORAL at 20:35

## 2024-02-07 NOTE — PROGRESS NOTES
[facility]     Cardiology Progress Note    Patient Name: Maria Del Rosario Colmenares  : 1945  MRN: [id number]  Primary Care Physician:  Annmarie David APRN  Date of admission: 2024    Subjective   Subjective     Chief Complaint: Follow-up atrial fibrillation, right hip fracture    Interval HPI:    Patient reports feeling foggy this morning.  Hip pain is improving.  She remains in atrial fibrillation with reasonably controlled ventricular rates.  Isolated PVCs noted on telemetry.  She is off Cardizem infusion.    Review of Systems   All systems were reviewed and negative except for: Right hip pain.  Negative for chest pain palpitations shortness of breath    Objective   Objective     Vitals:   Temp:  [97.3 °F (36.3 °C)-99.1 °F (37.3 °C)] 97.3 °F (36.3 °C)  Heart Rate:  [] 106  Resp:  [14-18] 18  BP: ()/(32-99) 119/92  Physical Exam      General : Alert, awake, no acute distress  CVS : Irregular, no murmur, rubs or gallops  Lungs: Clear to auscultation bilaterally, no crackles or rhonchi  Abdomen: Soft, nontender, bowel sounds heard in all 4 quadrants  Extremities: Warm, well-perfused, no pedal edema    Scheduled Meds:apixaban, 5 mg, Oral, Q12H  [Held by provider] furosemide, 20 mg, Oral, Daily  insulin regular, 2-7 Units, Subcutaneous, 4x Daily AC & at Bedtime  levothyroxine, 75 mcg, Oral, Q AM  metoprolol succinate XL, 50 mg, Oral, Q12H  rosuvastatin, 20 mg, Oral, Nightly  senna-docusate sodium, 2 tablet, Oral, BID  sodium chloride, 10 mL, Intravenous, Q12H             Result Review    Result Review:  I have personally reviewed the results from the time of this admission to 2024 08:39 EST and agree with these findings:  [x]  Laboratory  []  Microbiology  [x]  Radiology  [x]  EKG/Telemetry   [x]  Cardiology/Vascular   []  Pathology  []  Old records  []  Other:  Most notable findings include:     CBC          2024    04:18 2024    04:06 2024    04:16   CBC   WBC 12.01  8.90   9.21    RBC 3.86  3.48  3.32    Hemoglobin 11.4  10.4  9.9    Hematocrit 35.3  32.4  31.6    MCV 91.5  93.1  95.2    MCH 29.5  29.9  29.8    MCHC 32.3  32.1  31.3    RDW 13.2  13.0  12.7    Platelets 169  145  190      CMP          2/5/2024    04:18 2/6/2024    04:06 2/7/2024    04:16   CMP   Glucose 185  165  140    BUN 27  26  35    Creatinine 0.95  0.82  1.12    EGFR 61.5  73.3  50.4    Sodium 134  136  134    Potassium 4.8  4.8  4.3    Chloride 100  102  99    Calcium 9.0  8.6  8.8    BUN/Creatinine Ratio 28.4  31.7  31.3    Anion Gap 10.6  10.8  11.4       CARDIAC LABS:      Assessment & Plan   Assessment / Plan     Brief Patient Summary:  Maria Del Rosario Colmenares is a 78 y.o. female with  paroxysmal atrial fibrillation, hypothyroidism.  She presented to the emergency room yesterday after sustaining a mechanical fall at home.  She is admitted for intertrochanteric fracture of the right femur.  Also noted to be in atrial fibrillation with RVR.    Active Hospital Problems:  Active Hospital Problems    Diagnosis     **Fracture     Closed 2-part intertrochanteric fracture of proximal end of right femur      Paroxysmal atrial fibrillation : Remains in atrial fibrillation with moderately controlled ventricular rates, on Cardizem infusion     Elevated troponin : High sensitive troponins mildly elevated.  Total CPK significantly elevated with a mild elevation in CK-MB.  This is consistent with rhabdomyolysis, unlikely to be related to acute coronary syndrome.  She is chest pain-free.     Mitral regurgitation : Per previous echocardiogram done in 2021     Right hip fracture ; status post surgery      Plan:   Continue metoprolol succinate 50 mg twice daily  Continue Eliquis 5 mg twice daily for anticoagulation  Home diuretics on hold, may be restarted in a day or 2 once renal functions are back to baseline    PT/OT  We will see as needed while inpatient       CODE STATUS:   Level Of Support Discussed With: Patient  Code Status  (Patient has no pulse and is not breathing): CPR (Attempt to Resuscitate)  Medical Interventions (Patient has pulse or is breathing): Full Support      Electronically signed by Jermaine Powell MD, 02/06/24, 12:50 PM EST.

## 2024-02-07 NOTE — THERAPY TREATMENT NOTE
Patient Name: Maria Del Rosario Colmenares  : 1945    MRN: [id number]                              Today's Date: 2024       Admit Date: 2024    Visit Dx:     ICD-10-CM ICD-9-CM   1. Fracture  T14.8XXA 829.0   2. Closed 2-part intertrochanteric fracture of right femur, initial encounter  S72.141A 820.21   3. Difficulty walking  R26.2 719.7   4. Decreased activities of daily living (ADL)  Z78.9 V49.89     Patient Active Problem List   Diagnosis    Nonrheumatic mitral valve regurgitation    Mixed hyperlipidemia    Chronic diastolic heart failure    Longstanding persistent atrial fibrillation    Stroke    Fracture    Closed 2-part intertrochanteric fracture of proximal end of right femur     Past Medical History:   Diagnosis Date    Arthritis     Chronic combined systolic (congestive) and diastolic (congestive) heart failure     Hypothyroidism     Mixed hyperlipidemia     Paroxysmal atrial fibrillation     SOB (shortness of breath)     Thyroid disorder      Past Surgical History:   Procedure Laterality Date    ABDOMINAL HYSTERECTOMY      APPENDECTOMY      COLONOSCOPY      HIP TROCHANTERIC NAILING WITH INTRAMEDULLARY HIP SCREW Right 2024    Procedure: HIP TROCHANTERIC NAILING WITH INTRAMEDULLARY HIP SCREW;  Surgeon: Adrien Ordaz MD;  Location: [facility] MAIN OR;  Service: Orthopedics;  Laterality: Right;    HYSTERECTOMY        General Information       Row Name 24 1428          OT Time and Intention    Document Type therapy note (daily note)  -     Mode of Treatment individual therapy;occupational therapy  -       Row Name 24 1428          General Information    Existing Precautions/Restrictions fall;weight bearing  WBAT RLE  -       Row Name 24 1428          Safety Issues, Functional Mobility    Safety Issues Affecting Function (Mobility) ability to follow commands;awareness of need for assistance;insight into deficits/self-awareness;impulsivity;judgment;safety precaution awareness   -LF               User Key  (r) = Recorded By, (t) = Taken By, (c) = Cosigned By      Initials Name Provider Type     Kathia Valdovinos OT Occupational Therapist                     Mobility/ADL's       Row Name 02/07/24 1429          Bed Mobility    Bed Mobility supine-sit;sit-supine  -LF     Supine-Sit [place] (Bed Mobility) maximum assist (25% patient effort);1 person assist  -LF     Sit-Supine [place] (Bed Mobility) maximum assist (25% patient effort);1 person assist  -LF     Bed Mobility, Safety Issues decreased use of legs for bridging/pushing;cognitive deficits limit understanding  -     Assistive Device (Bed Mobility) bed rails;draw sheet  -     Comment, (Bed Mobility) Intitally required min/mod assist sitting EOB, progressing to SBA after max cues for use of bedrail and scooting to  firmly place BLEs on floor. Adamantly declined attempts for sit to stand.  -       Row Name 02/07/24 1429          Mobility    Extremity Weight-bearing Status right lower extremity  -LF     Right Lower Extremity (Weight-bearing Status) weight-bearing as tolerated (WBAT)  -               User Key  (r) = Recorded By, (t) = Taken By, (c) = Cosigned By      Initials Name Provider Type     Kathia Valdovinos OT Occupational Therapist                   Obj/Interventions       Row Name 02/07/24 1430          Motor Skills    Motor Skills functional endurance  -LF     Functional Endurance Fair-  -LF       Row Name 02/07/24 1430          Balance    Balance Assessment sitting dynamic balance  -LF     Static Sitting Balance standby assist  -LF     Balance Interventions sitting;static;occupation based/functional task  -LF               User Key  (r) = Recorded By, (t) = Taken By, (c) = Cosigned By      Initials Name Provider Type     Kathia Valdovinos OT Occupational Therapist                   Goals/Plan    No documentation.                  Clinical Impression       Row Name 02/07/24 1431          Pain Assessment     Pain Intervention(s) Nursing Notified  -       Row Name 02/07/24 1431          Pain Scale: FACES Pre/Post-Treatment    Pain: FACES Scale, Pretreatment 2-->hurts little bit  -     Posttreatment Pain Rating 8-->hurts whole lot  -       Row Name 02/07/24 1431          Plan of Care Review    Plan of Care Reviewed With patient  -     Progress improving  -     Outcome Evaluation Patient initially agreeable for functional transfer training but grew resistive d/t pain, nursing notified. Once seated on EOB she initially required min/mod assist maintaining static sitting balance but progressed to SBA after cues for proper base of support and use of bedrail. She would benefit from continued OT services to maximize independence.  -       Row Name 02/07/24 1431          Vital Signs    O2 Delivery Pre Treatment room air  -     O2 Delivery Intra Treatment room air  -     O2 Delivery Post Treatment room air  -       Row Name 02/07/24 1431          Positioning and Restraints    Pre-Treatment Position in bed  -LF     Post Treatment Position bed  -LF     In Bed fowlers;call light within reach;encouraged to call for assist;exit alarm on  -               User Key  (r) = Recorded By, (t) = Taken By, (c) = Cosigned By      Initials Name Provider Type     Kathia Valdovinos, OT Occupational Therapist                   Outcome Measures       Row Name 02/07/24 1433          How much help from another is currently needed...    Putting on and taking off regular lower body clothing? 1  -LF     Bathing (including washing, rinsing, and drying) 2  -LF     Toileting (which includes using toilet bed pan or urinal) 1  -LF     Putting on and taking off regular upper body clothing 3  -LF     Taking care of personal grooming (such as brushing teeth) 3  -LF     Eating meals 4  -LF     AM-PAC 6 Clicks Score (OT) 14  -LF       Row Name 02/07/24 1205          How much help from another person do you currently need...    Turning from your  back to your side while in flat bed without using bedrails? 3  -DK     Moving from lying on back to sitting on the side of a flat bed without bedrails? 2  -DK     Moving to and from a bed to a chair (including a wheelchair)? 1  -DK     Standing up from a chair using your arms (e.g., wheelchair, bedside chair)? 2  -DK     Climbing 3-5 steps with a railing? 1  -DK     To walk in hospital room? 2  -DK     AM-PAC 6 Clicks Score (PT) 11  -DK     Highest Level of Mobility Goal 4 --> Transfer to chair/commode  -DK       Row Name 02/07/24 1433 02/07/24 1205       Functional Assessment    Outcome Measure Options AM-PAC 6 Clicks Daily Activity (OT);Optimal Instrument  -LF AM-PAC 6 Clicks Basic Mobility (PT)  -      Row Name 02/07/24 1433          Optimal Instrument    Optimal Instrument Optimal - 3  -LF     Bending/Stooping 4  -LF     Standing 4  -LF     Reaching 1  -LF     From the list, choose the 3 activities you would most like to be able to do without any difficulty Bending/stooping;Standing;Reaching  -LF     Total Score Optimal - 3 9  -LF               User Key  (r) = Recorded By, (t) = Taken By, (c) = Cosigned By      Initials Name Provider Type    Pearl Kennedy PTA Physical Therapist Assistant     Kathia Valdovinos OT Occupational Therapist                    Occupational Therapy Education       Title: PT OT SLP Therapies (In Progress)       Topic: Occupational Therapy (In Progress)       Point: ADL training (In Progress)       Description:   Instruct learner(s) on proper safety adaptation and remediation techniques during self care or transfers.   Instruct in proper use of assistive devices.                  Learning Progress Summary             Patient Acceptance, E,TB, NR by  at 2/6/2024 2299                         Point: Home exercise program (Not Started)       Description:   Instruct learner(s) on appropriate technique for monitoring, assisting and/or progressing therapeutic exercises/activities.                   Learner Progress:  Not documented in this visit.              Point: Precautions (In Progress)       Description:   Instruct learner(s) on prescribed precautions during self-care and functional transfers.                  Learning Progress Summary             Patient Acceptance, E,TB, NR by  at 2/6/2024 1458                         Point: Body mechanics (In Progress)       Description:   Instruct learner(s) on proper positioning and spine alignment during self-care, functional mobility activities and/or exercises.                  Learning Progress Summary             Patient Acceptance, E,TB, NR by  at 2/6/2024 1458                                         User Key       Initials Effective Dates Name Provider Type Discipline     06/16/21 -  Kathia Valdovinos OT Occupational Therapist OT                  OT Recommendation and Plan  Planned Therapy Interventions (OT): activity tolerance training, BADL retraining, functional balance retraining, occupation/activity based interventions, patient/caregiver education/training, strengthening exercise, transfer/mobility retraining  Therapy Frequency (OT): 5 times/wk  Plan of Care Review  Plan of Care Reviewed With: patient  Progress: improving  Outcome Evaluation: Patient initially agreeable for functional transfer training but grew resistive d/t pain, nursing notified. Once seated on EOB she initially required min/mod assist maintaining static sitting balance but progressed to SBA after cues for proper base of support and use of bedrail. She would benefit from continued OT services to maximize independence.     Time Calculation:   Evaluation Complexity (OT)  Review Occupational Profile/Medical/Therapy History Complexity: brief/low complexity  Assessment, Occupational Performance/Identification of Deficit Complexity: 3-5 performance deficits  Clinical Decision Making Complexity (OT): problem focused assessment/low complexity  Overall Complexity of  Evaluation (OT): low complexity     Time Calculation- OT       Row Name 02/07/24 1433             Time Calculation- OT    OT Received On 02/07/24  -LF      OT Goal Re-Cert Due Date 02/15/24  -LF         Timed Charges    20318 - OT Therapeutic Activity Minutes 15  -LF         Total Minutes    Timed Charges Total Minutes 15  -LF       Total Minutes 15  -LF                User Key  (r) = Recorded By, (t) = Taken By, (c) = Cosigned By      Initials Name Provider Type     Kathia Valdovinos OT Occupational Therapist                  Therapy Charges for Today       Code Description Service Date Service Provider Modifiers Qty    [id number]  OT EVAL LOW COMPLEXITY 3 2/6/2024 Kathia Valdovinos OT GO 1    [id number]  OT THERAPEUTIC ACT EA 15 MIN 2/7/2024 Kathia Valdovinos OT GO 1                 Kathia Valdovinos OT  2/7/2024

## 2024-02-07 NOTE — SIGNIFICANT NOTE
02/07/24 1300   Coping/Psychosocial   Observed Emotional State calm;cooperative   Verbalized Emotional State hopefulness   Trust Relationship/Rapport empathic listening provided   Involvement in Care interacting with patient   Additional Documentation Spiritual Care (Group)   Spiritual Care   Use of Spiritual Resources non-Anabaptist use of spiritual care   Spiritual Care Source  initiative   Spiritual Care Follow-Up follow-up, none required as presently assessed   Response to Spiritual Care receptive of support;engaged in conversation;thanks expressed   Spiritual Care Interventions supportive conversation provided   Spiritual Care Visit Type initial   Receptivity to Spiritual Care visit welcomed

## 2024-02-07 NOTE — PLAN OF CARE
Goal Outcome Evaluation:   Pt resting in bed. Up in chair most of shift. Ambulated to BSC via 2 max assist, did not void, helped pt back to bed. Did not tolerate moving well. Bladder scanned pt with 105 in bladder. Pain relieved with medication, bolused 250mL per provider to help urine output. VSS, turned cardizem drip off, tolerating well. A&O. No other issues noted.   Laura Carmen RN

## 2024-02-07 NOTE — PLAN OF CARE
Goal Outcome Evaluation:           Progress: improving  Outcome Evaluation: VSS, tolerating being off cardizem drip, worked with PT today, will most likely be discharged to rehab tomorrow

## 2024-02-07 NOTE — PROGRESS NOTES
[facility]   Hospitalist Progress Note  Date: 2024  Patient Name: Maria Del Rosario Colmenares  : 1945  MRN: [id number]  Date of admission: 2024  Consultants:   -Cardiology: Dr. Jermaine Powell  -Orthopedic Surgery: Dr. Adrien Ordaz    Subjective   Subjective     Chief Complaint: Fall with right lower extremity and left shoulder pain    Summary:   Maria Del Rosario Colmenares is a 78 y.o. female with chronic paroxysmal atrial fibrillation on Eliquis (last dose 2024), hypothyroidism and hyperlipidemia that presented to the ED after mechanical fall.  Patient was found to have a right intertrochanteric proximal femur fracture.  Hospitalist service contacted for further evaluation and management.  Orthopedic surgery consulted.  Patient was noted to be in A-fib with RVR, she was given IV dose of Cardizem but heart rate remained elevated.  Cardiology consulted to assist in care.  Cardizem drip started.  Patient had right hip trochanteric nailing with intramedullary hip screw done on 2024 and tolerated procedure well without any acute postprocedural complications.  Cardizem drip able to be weaned.    Interval Followup:   No acute events overnight.  Patient denies any chest pain or shortness of breath.  Pain being well-controlled with as needed Ken[place].  Nursing with no additional acute issues to report.    Procedures:  -Right hip trochanteric nailing with intramedullary hip screw (2024)    Pain Medication:   -Norco  -IV Dilaudid    Objective   Objective     Vitals:   Temp:  [97.3 °F (36.3 °C)-99.1 °F (37.3 °C)] 98.6 °F (37 °C)  Heart Rate:  [] 101  Resp:  [16-18] 18  BP: ()/(32-92) 119/92  Physical Exam   Gen: No acute distress, conversant, pleasant, lying in bed  Resp: CTAB, No w/r/r, normal respiratory effort  Card: IRIR, systolic murmur appreciated  Abd: Soft, Nontender, Nondistended, + bowel sounds    Result Review    Result Review:  I have personally reviewed the results as below and agree  with these findings:  []  Laboratory:   CMP          2/5/2024    04:18 2/6/2024    04:06 2/7/2024    04:16   CMP   Glucose 185  165  140    BUN 27  26  35    Creatinine 0.95  0.82  1.12    EGFR 61.5  73.3  50.4    Sodium 134  136  134    Potassium 4.8  4.8  4.3    Chloride 100  102  99    Calcium 9.0  8.6  8.8    BUN/Creatinine Ratio 28.4  31.7  31.3    Anion Gap 10.6  10.8  11.4      CBC          2/5/2024    04:18 2/6/2024    04:06 2/7/2024    04:16   CBC   WBC 12.01  8.90  9.21    RBC 3.86  3.48  3.32    Hemoglobin 11.4  10.4  9.9    Hematocrit 35.3  32.4  31.6    MCV 91.5  93.1  95.2    MCH 29.5  29.9  29.8    MCHC 32.3  32.1  31.3    RDW 13.2  13.0  12.7    Platelets 169  145  190    Magnesium and phosphorus within normal limits  []  Microbiology:   []  Radiology:   [x]  EKG/Telemetry:    []  Cardiology/Vascular:    []  Pathology:  []  Old records:  []  Other:    Assessment & Plan   Assessment / Plan     Assessment:  Closed 2 part intertrochanteric fracture proximal end of right femur, initial encounter s/p right hip trochanteric nailing with intramedullary hip screw  Chronic paroxysmal atrial fibrillation on Eliquis as an outpatient, acutely in RVR  Elevated troponin  Hypothyroidism  Hyperlipidemia  Mitral regurgitation    Plan:  -Cardiology and Orthopedic Surgery consulted and following, appreciate assistance and recommendations in the care of this patient.  -Continue metoprolol succinate to 50 mg twice daily  -Continue home Eliquis  -Plan to restart home diuretics with renal function slightly improved  -PT/OT consulted  -Pain management with as needed Norco  -Will monitor electrolytes and renal function with BMP and magnesium level in the AM  -Will monitor WBC and Hgb with CBC in the AM  -Clinical course will dictate further management     DVT Prophylaxis: Apixaban  Diet: Regular  Dispo: Patient has accepted bed offer at [facility]  Code Status: Full code     Personally reviewed patients labs and imaging,  discussed with patient and nurse at bedside. Discussed management with the following consultants: Cardiology and Orthopedic Surgery.     Part of this note may be an electronic transcription/translation of spoken language to printed text using the Dragon dictation system.    DVT prophylaxis:  Medical and mechanical DVT prophylaxis orders are present.        CODE STATUS:   Level Of Support Discussed With: Patient  Code Status (Patient has no pulse and is not breathing): CPR (Attempt to Resuscitate)  Medical Interventions (Patient has pulse or is breathing): Full Support      Electronically signed by Elvin Bales MD, 02/07/24, 3:15 PM EST.

## 2024-02-07 NOTE — THERAPY TREATMENT NOTE
Acute Care - Physical Therapy Treatment Note   Ralf     Patient Name: Maria Del Rosario Colmenares  : 1945  MRN: [id number]  Today's Date: 2024      Visit Dx:     ICD-10-CM ICD-9-CM   1. Fracture  T14.8XXA 829.0   2. Closed 2-part intertrochanteric fracture of right femur, initial encounter  S72.141A 820.21   3. Difficulty walking  R26.2 719.7   4. Decreased activities of daily living (ADL)  Z78.9 V49.89     Patient Active Problem List   Diagnosis    Nonrheumatic mitral valve regurgitation    Mixed hyperlipidemia    Chronic diastolic heart failure    Longstanding persistent atrial fibrillation    Stroke    Fracture    Closed 2-part intertrochanteric fracture of proximal end of right femur     Past Medical History:   Diagnosis Date    Arthritis     Chronic combined systolic (congestive) and diastolic (congestive) heart failure     Hypothyroidism     Mixed hyperlipidemia     Paroxysmal atrial fibrillation     SOB (shortness of breath)     Thyroid disorder      Past Surgical History:   Procedure Laterality Date    ABDOMINAL HYSTERECTOMY      APPENDECTOMY      COLONOSCOPY      HIP TROCHANTERIC NAILING WITH INTRAMEDULLARY HIP SCREW Right 2024    Procedure: HIP TROCHANTERIC NAILING WITH INTRAMEDULLARY HIP SCREW;  Surgeon: Adrien Ordaz MD;  Location: [facility] OR;  Service: Orthopedics;  Laterality: Right;    HYSTERECTOMY       PT Assessment (last 12 hours)       PT Evaluation and Treatment       Row Name 24 1209          Physical Therapy Time and Intention    Subjective Information complains of;weakness;fatigue;pain  -DK     Document Type therapy note (daily note)  -DK     Mode of Treatment individual therapy;physical therapy  -DK     Patient Effort fair  -DK     Symptoms Noted During/After Treatment increased pain  -DK     Comment Pt is very anxious, fear of pain-so stiffens in preparation.  She was able to tolerate exercises and transfers to sit EOB, but was unable to stand this session.  -DK        Row Name 02/07/24 1207          Pain    Pretreatment Pain Rating 0/10 - no pain  -DK     Posttreatment Pain Rating 7/10  -DK     Pain Location - Side/Orientation Right  -DK     Pain Location generalized  -DK     Pain Location - hip  -DK     Pain Intervention(s) Repositioned;Ambulation/increased activity;Elevated;Therapeutic presence  -DK       Row Name 02/07/24 1207          Cognition    Affect/Mental Status (Cognition) confused;anxious  -DK     Behavioral Issues (Cognition) overwhelmed easily;difficulty managing stress  -DK     Orientation Status (Cognition) oriented to;person;situation  -DK     Follows Commands (Cognition) physical/tactile prompts required;repetition of directions required;verbal cues/prompting required  -DK     Cognitive Function attention deficit  -DK     Attention Deficit (Cognition) minimal deficit;concentration;focused/sustained attention  -DK     Personal Safety Interventions gait belt;nonskid shoes/slippers when out of bed;supervised activity  -DK       Row Name 02/07/24 1207          Mobility    Extremity Weight-bearing Status right lower extremity  -DK     Right Lower Extremity (Weight-bearing Status) weight-bearing as tolerated (WBAT)  -       Row Name 02/07/24 1207          Bed Mobility    Bed Mobility supine-sit-supine;scooting/bridging  -DK     Scooting/Bridging [place] (Bed Mobility) moderate assist (50% patient effort);2 person assist  -DK     Supine-Sit [place] (Bed Mobility) maximum assist (25% patient effort);1 person assist  -DK     Sit-Supine [place] (Bed Mobility) maximum assist (25% patient effort);1 person assist  -DK     Supine-Sit-Supine [place] (Bed Mobility) maximum assist (25% patient effort);1 person assist  -DK     Bed Mobility, Safety Issues decreased use of legs for bridging/pushing;cognitive deficits limit understanding  -DK     Assistive Device (Bed Mobility) bed rails;draw sheet  -DK     Comment, (Bed Mobility) Pt sat EOB x 2-3 minutes  with SBA.  -       Row Name 02/07/24 1207          Transfers    Transfers sit-stand transfer;stand-sit transfer  -     Comment, (Transfers) 2 attempts to stand with max x 1 and a rolling walker, without success.  Pt was returned to bed on alert post treatment.  -       Row Name 02/07/24 1207          Sit-Stand Transfer    Sit-Stand [place] (Transfers) maximum assist (25% patient effort);1 person assist  -     Assistive Device (Sit-Stand Transfers) walker, front-wheeled  -       Row Name 02/07/24 1207          Stand-Sit Transfer    Stand-Sit [place] (Transfers) maximum assist (25% patient effort);1 person assist  -     Assistive Device (Stand-Sit Transfers) walker, front-wheeled  -       Row Name 02/07/24 1207          Safety Issues, Functional Mobility    Safety Issues Affecting Function (Mobility) ability to follow commands;awareness of need for assistance;impulsivity;judgment;safety precaution awareness  -     Impairments Affecting Function (Mobility) balance;cognition;endurance/activity tolerance;pain;range of motion (ROM);strength  -DK     Cognitive Impairments, Mobility Safety/Performance attention;awareness, need for assistance;impulsivity;judgment  -       Row Name 02/07/24 1207          Balance    Balance Assessment sitting static balance;sitting dynamic balance;standing static balance;standing dynamic balance  -DK     Static Sitting Balance standby assist  -     Dynamic Sitting Balance standby assist  -DK     Position, Sitting Balance unsupported;sitting edge of bed  -DK     Static Standing Balance maximum assist;1-person assist  -DK     Position/Device Used, Standing Balance walker, front-wheeled  -     Balance Interventions sitting;standing;supported;static;tandem standing  -       Row Name 02/07/24 1207          Motor Skills    Motor Skills --  therapeutic exercises  -DK     Coordination WFL  -     Therapeutic Exercise hip;knee;ankle  -DK     Additional Documentation  --  Pt was given cues to relax during movement of the right leg, as she tends to stiffen in anticipation of pain.  -       Row Name 02/07/24 1207          Hip (Therapeutic Exercise)    Hip (Therapeutic Exercise) AAROM (active assistive range of motion)  -     Hip AAROM (Therapeutic Exercise) bilateral;flexion;extension;aBduction;aDduction;supine;10 repetitions;2 sets  -       Row Name 02/07/24 1207          Knee (Therapeutic Exercise)    Knee (Therapeutic Exercise) AAROM (active assistive range of motion)  -     Knee AAROM (Therapeutic Exercise) bilateral;flexion;extension;supine;10 repetitions;2 sets  -DK       Row Name 02/07/24 1207          Ankle (Therapeutic Exercise)    Ankle (Therapeutic Exercise) AAROM (active assistive range of motion)  -     Ankle AAROM (Therapeutic Exercise) bilateral;dorsiflexion;plantarflexion;supine;10 repetitions;2 sets  -       Row Name             Wound Right anterior greater trochanter Incision    Wound - Properties Group Side: Right  -EM Orientation: anterior  -EM Location: greater trochanter  -EM Primary Wound Type: Incision  -EM    Retired Wound - Properties Group Side: Right  -EM Orientation: anterior  -EM Location: greater trochanter  -EM Primary Wound Type: Incision  -EM    Retired Wound - Properties Group Side: Right  -EM Location: greater trochanter  -EM Primary Wound Type: Incision  -EM      Row Name 02/07/24 1207          Plan of Care Review    Plan of Care Reviewed With patient  -     Progress no change  -       Row Name 02/07/24 1207          Positioning and Restraints    Pre-Treatment Position in bed  -DK     Post Treatment Position bed  -DK     In Bed supine;call light within reach;encouraged to call for assist;exit alarm on;side rails up x2;with other staff;legs elevated;R heel elevated  -       Row Name 02/07/24 1207          Therapy Assessment/Plan (PT)    Rehab Potential (PT) fair, will monitor progress closely  -     Criteria for Skilled  Interventions Met (PT) skilled treatment is necessary  -DK     Therapy Frequency (PT) daily  -DK     Problem List (PT) problems related to;balance;mobility;strength;range of motion (ROM);pain;hearing  -DK     Activity Limitations Related to Problem List (PT) unable to ambulate safely;unable to transfer safely  -DK       Row Name 02/07/24 1204          Progress Summary (PT)    Progress Toward Functional Goals (PT) progress toward functional goals is fair  -               User Key  (r) = Recorded By, (t) = Taken By, (c) = Cosigned By      Initials Name Provider Type    Pearl Kennedy PTA Physical Therapist Assistant    Andrade Tinoco RN Registered Nurse                    Physical Therapy Education       Title: PT OT SLP Therapies (In Progress)       Topic: Physical Therapy (In Progress)       Point: Mobility training (In Progress)       Learning Progress Summary             Patient Acceptance, E,D, NR by  at 2/6/2024 1458                         Point: Home exercise program (Not Started)       Learner Progress:  Not documented in this visit.              Point: Body mechanics (In Progress)       Learning Progress Summary             Patient Acceptance, E,D, NR by AV at 2/6/2024 1458                         Point: Precautions (In Progress)       Learning Progress Summary             Patient Acceptance, E,D, NR by AV at 2/6/2024 1458                                         User Key       Initials Effective Dates Name Provider Type Discipline     06/11/21 -  Simeon Salinas, PT Physical Therapist PT                  PT Recommendation and Plan  Planned Therapy Interventions (PT): balance training, bed mobility training, gait training, strengthening, transfer training  Therapy Frequency (PT): daily  Progress Summary (PT)  Progress Toward Functional Goals (PT): progress toward functional goals is fair  Plan of Care Reviewed With: patient  Progress: no change   Outcome Measures       Row Name 02/07/24 2961  02/06/24 1400          How much help from another person do you currently need...    Turning from your back to your side while in flat bed without using bedrails? 3  -DK 2  -AV     Moving from lying on back to sitting on the side of a flat bed without bedrails? 2  -DK 2  -AV     Moving to and from a bed to a chair (including a wheelchair)? 1  -DK 2  -AV     Standing up from a chair using your arms (e.g., wheelchair, bedside chair)? 2  -DK 2  -AV     Climbing 3-5 steps with a railing? 1  -DK 1  -AV     To walk in hospital room? 2  -DK 1  -AV     AM-PAC 6 Clicks Score (PT) 11  -DK 10  -AV     Highest Level of Mobility Goal 4 --> Transfer to chair/commode  -DK 4 --> Transfer to chair/commode  -AV        Functional Assessment    Outcome Measure Options AM-PAC 6 Clicks Basic Mobility (PT)  -DK AM-PAC 6 Clicks Basic Mobility (PT)  -AV               User Key  (r) = Recorded By, (t) = Taken By, (c) = Cosigned By      Initials Name Provider Type    Pearl Kennedy PTA Physical Therapist Assistant    AV Simeon Salinas, PT Physical Therapist                     Time Calculation:    PT Charges       Row Name 02/07/24 1216             Time Calculation    PT Received On 02/07/24  -DK      PT Goal Re-Cert Due Date 02/15/24  -DK         Timed Charges    70019 - PT Therapeutic Exercise Minutes 16  -DK      30514 - PT Therapeutic Activity Minutes 12  -DK         Total Minutes    Timed Charges Total Minutes 28  -DK       Total Minutes 28  -DK                User Key  (r) = Recorded By, (t) = Taken By, (c) = Cosigned By      Initials Name Provider Type    Pearl Kennedy PTA Physical Therapist Assistant                  Therapy Charges for Today       Code Description Service Date Service Provider Modifiers Qty    55330236040 HC PT THER PROC EA 15 MIN 2/7/2024 Pearl Kunz PTA GP 1    58431505339 HC PT THERAPEUTIC ACT EA 15 MIN 2/7/2024 Pearl Kunz PTA GP 1            PT G-Codes  Outcome Measure Options: AM-PAC 6 Clicks  Basic Mobility (PT)  AM-PAC 6 Clicks Score (PT): 11  AM-PAC 6 Clicks Score (OT): 14    Pearl Kunz, PTA  2/7/2024

## 2024-02-08 ENCOUNTER — TELEPHONE (OUTPATIENT)
Dept: CARDIOLOGY | Facility: CLINIC | Age: 79
End: 2024-02-08
Payer: MEDICARE

## 2024-02-08 VITALS
HEART RATE: 95 BPM | WEIGHT: 152.34 LBS | SYSTOLIC BLOOD PRESSURE: 151 MMHG | TEMPERATURE: 98.8 F | OXYGEN SATURATION: 92 % | DIASTOLIC BLOOD PRESSURE: 75 MMHG | BODY MASS INDEX: 23.91 KG/M2 | RESPIRATION RATE: 18 BRPM | HEIGHT: 67 IN

## 2024-02-08 LAB
ANION GAP SERPL CALCULATED.3IONS-SCNC: 7.5 MMOL/L (ref 5–15)
BUN SERPL-MCNC: 25 MG/DL (ref 8–23)
BUN/CREAT SERPL: 28.4 (ref 7–25)
CALCIUM SPEC-SCNC: 8.9 MG/DL (ref 8.6–10.5)
CHLORIDE SERPL-SCNC: 97 MMOL/L (ref 98–107)
CO2 SERPL-SCNC: 27.5 MMOL/L (ref 22–29)
CREAT SERPL-MCNC: 0.88 MG/DL (ref 0.57–1)
DEPRECATED RDW RBC AUTO: 43.4 FL (ref 37–54)
EGFRCR SERPLBLD CKD-EPI 2021: 67.4 ML/MIN/1.73
ERYTHROCYTE [DISTWIDTH] IN BLOOD BY AUTOMATED COUNT: 12.6 % (ref 12.3–15.4)
GLUCOSE BLDC GLUCOMTR-MCNC: 126 MG/DL (ref 70–99)
GLUCOSE BLDC GLUCOMTR-MCNC: 136 MG/DL (ref 70–99)
GLUCOSE BLDC GLUCOMTR-MCNC: 205 MG/DL (ref 70–99)
GLUCOSE SERPL-MCNC: 121 MG/DL (ref 65–99)
HCT VFR BLD AUTO: 30.7 % (ref 34–46.6)
HGB BLD-MCNC: 9.8 G/DL (ref 12–15.9)
MAGNESIUM SERPL-MCNC: 2 MG/DL (ref 1.6–2.4)
MCH RBC QN AUTO: 29.9 PG (ref 26.6–33)
MCHC RBC AUTO-ENTMCNC: 31.9 G/DL (ref 31.5–35.7)
MCV RBC AUTO: 93.6 FL (ref 79–97)
PLATELET # BLD AUTO: 202 10*3/MM3 (ref 140–450)
PMV BLD AUTO: 10.7 FL (ref 6–12)
POTASSIUM SERPL-SCNC: 4.5 MMOL/L (ref 3.5–5.2)
RBC # BLD AUTO: 3.28 10*6/MM3 (ref 3.77–5.28)
SODIUM SERPL-SCNC: 132 MMOL/L (ref 136–145)
WBC NRBC COR # BLD AUTO: 8.1 10*3/MM3 (ref 3.4–10.8)

## 2024-02-08 PROCEDURE — 82948 REAGENT STRIP/BLOOD GLUCOSE: CPT

## 2024-02-08 PROCEDURE — 85027 COMPLETE CBC AUTOMATED: CPT | Performed by: INTERNAL MEDICINE

## 2024-02-08 PROCEDURE — 80048 BASIC METABOLIC PNL TOTAL CA: CPT | Performed by: INTERNAL MEDICINE

## 2024-02-08 PROCEDURE — 83735 ASSAY OF MAGNESIUM: CPT | Performed by: INTERNAL MEDICINE

## 2024-02-08 PROCEDURE — 25010000002 MORPHINE PER 10 MG: Performed by: ORTHOPAEDIC SURGERY

## 2024-02-08 PROCEDURE — 63710000001 INSULIN REGULAR HUMAN PER 5 UNITS

## 2024-02-08 RX ORDER — ACETAMINOPHEN 325 MG/1
650 TABLET ORAL EVERY 4 HOURS PRN
Start: 2024-02-08

## 2024-02-08 RX ORDER — AMOXICILLIN 250 MG
2 CAPSULE ORAL 2 TIMES DAILY PRN
Start: 2024-02-08

## 2024-02-08 RX ORDER — HYDROCODONE BITARTRATE AND ACETAMINOPHEN 7.5; 325 MG/1; MG/1
1 TABLET ORAL EVERY 6 HOURS PRN
Start: 2024-02-08 | End: 2024-02-11

## 2024-02-08 RX ADMIN — MORPHINE SULFATE 1 MG: 2 INJECTION, SOLUTION INTRAMUSCULAR; INTRAVENOUS at 04:45

## 2024-02-08 RX ADMIN — Medication 10 ML: at 08:54

## 2024-02-08 RX ADMIN — DOCUSATE SODIUM 50MG AND SENNOSIDES 8.6MG 2 TABLET: 8.6; 5 TABLET, FILM COATED ORAL at 08:54

## 2024-02-08 RX ADMIN — LEVOTHYROXINE SODIUM 75 MCG: 75 TABLET ORAL at 06:46

## 2024-02-08 RX ADMIN — HYDROCODONE BITARTRATE AND ACETAMINOPHEN 1 TABLET: 7.5; 325 TABLET ORAL at 07:43

## 2024-02-08 RX ADMIN — INSULIN HUMAN 3 UNITS: 100 INJECTION, SOLUTION PARENTERAL at 12:26

## 2024-02-08 RX ADMIN — APIXABAN 5 MG: 5 TABLET, FILM COATED ORAL at 08:54

## 2024-02-08 RX ADMIN — METOPROLOL SUCCINATE 50 MG: 50 TABLET, EXTENDED RELEASE ORAL at 08:54

## 2024-02-08 NOTE — TELEPHONE ENCOUNTER
Caller: AMY    Relationship to patient: Provider    Best call back number: [phone]    Chief complaint: NO AVAILABILITY     Type of visit: HOSPITAL FOLLOW UP    Requested date: 2 WEEK S    Additional notes: PATIENT IS GOING TO [facility] AFTER HER DISCHARGE TODAY, SHE IS NEEDING A 2 WEEK FOLLOW UP. PLEASE CALL HER OR [facility] TO SCHEDULE, THANK YOU!

## 2024-02-08 NOTE — PROGRESS NOTES
[facility]     Progress Note    Patient Name: Maria Del Rosario Colmenares  : 1945  MRN: [id number]  Primary Care Physician:  Annmarie David APRN  Date of admission: 2024    Subjective   Subjective     HPI:  Patient Reports still having some pain in the hip.  She denies any chest pain or shortness of air.  Her daughter visited her today from out of town.    Review of Systems   See HPI    Objective   Objective     Vitals:   Temp:  [97.3 °F (36.3 °C)-98.6 °F (37 °C)] 97.3 °F (36.3 °C)  Heart Rate:  [] 95  Resp:  [16-18] 18  BP: ()/(32-92) 128/78  Flow (L/min):  [2] 2  Physical Exam    General: Alert, no acute distress   Chest: Unlabored breathing, cardiovascular: Regular heart rate   Musculoskeletal: Neurovascular intact extremity.  Positive pulses.  Dressing intact to the hip.  Negative Cesario.  Sensation intact to light touch.    Result Review      WBC   Date Value Ref Range Status   2024 9.21 3.40 - 10.80 10*3/mm3 Final     RBC   Date Value Ref Range Status   2024 3.32 (L) 3.77 - 5.28 10*6/mm3 Final     Hemoglobin   Date Value Ref Range Status   2024 9.9 (L) 12.0 - 15.9 g/dL Final     Hematocrit   Date Value Ref Range Status   2024 31.6 (L) 34.0 - 46.6 % Final     MCV   Date Value Ref Range Status   2024 95.2 79.0 - 97.0 fL Final     MCH   Date Value Ref Range Status   2024 29.8 26.6 - 33.0 pg Final     MCHC   Date Value Ref Range Status   2024 31.3 (L) 31.5 - 35.7 g/dL Final     RDW   Date Value Ref Range Status   2024 12.7 12.3 - 15.4 % Final     RDW-SD   Date Value Ref Range Status   2024 43.8 37.0 - 54.0 fl Final     MPV   Date Value Ref Range Status   2024 11.1 6.0 - 12.0 fL Final     Platelets   Date Value Ref Range Status   2024 190 140 - 450 10*3/mm3 Final     Neutrophil %   Date Value Ref Range Status   2024 76.7 (H) 42.7 - 76.0 % Final     Lymphocyte %   Date Value Ref Range Status   2024 11.6 (L) 19.6 -  45.3 % Final     Monocyte %   Date Value Ref Range Status   02/05/2024 10.8 5.0 - 12.0 % Final     Eosinophil %   Date Value Ref Range Status   02/05/2024 0.0 (L) 0.3 - 6.2 % Final     Basophil %   Date Value Ref Range Status   02/05/2024 0.2 0.0 - 1.5 % Final     Immature Grans %   Date Value Ref Range Status   02/05/2024 0.7 (H) 0.0 - 0.5 % Final     Neutrophils, Absolute   Date Value Ref Range Status   02/05/2024 9.20 (H) 1.70 - 7.00 10*3/mm3 Final     Lymphocytes, Absolute   Date Value Ref Range Status   02/05/2024 1.39 0.70 - 3.10 10*3/mm3 Final     Monocytes, Absolute   Date Value Ref Range Status   02/05/2024 1.30 (H) 0.10 - 0.90 10*3/mm3 Final     Eosinophils, Absolute   Date Value Ref Range Status   02/05/2024 0.00 0.00 - 0.40 10*3/mm3 Final     Basophils, Absolute   Date Value Ref Range Status   02/05/2024 0.03 0.00 - 0.20 10*3/mm3 Final     Immature Grans, Absolute   Date Value Ref Range Status   02/05/2024 0.09 (H) 0.00 - 0.05 10*3/mm3 Final     nRBC   Date Value Ref Range Status   02/05/2024 0.0 0.0 - 0.2 /100 WBC Final        Result Review:  I have personally reviewed the results from the time of this admission to 2/7/2024 21:39 EST and agree with these findings:  [x]  Laboratory  []  Microbiology  []  Radiology  []  EKG/Telemetry   []  Cardiology/Vascular   []  Pathology  []  Old records  []  Other:      Assessment & Plan   Assessment / Plan     Brief Patient Summary:  Maria Del Rosario Colmenares is a 78 y.o. female who is status post intramedullary nailing right proximal femur fracture    Active Hospital Problems:  Active Hospital Problems    Diagnosis     **Fracture     Closed 2-part intertrochanteric fracture of proximal end of right femur      Plan: Weightbearing as tolerated with walker  Physical and Occupational Therapy  Pain control  DVT prophylaxis  Discharge planning: Inpatient rehab when able       DVT prophylaxis:  Medical and mechanical DVT prophylaxis orders are present.        CODE STATUS:   Level  Of Support Discussed With: Patient  Code Status (Patient has no pulse and is not breathing): CPR (Attempt to Resuscitate)  Medical Interventions (Patient has pulse or is breathing): Full Support    Disposition:  I expect patient to be discharged to inpatient rehab when able.    Electronically signed by Adrien Ordaz MD, 02/07/24, 9:39 PM EST.

## 2024-02-08 NOTE — DISCHARGE SUMMARY
[facility]         HOSPITALIST  DISCHARGE SUMMARY    Patient Name: Maria Del Rosario Colmenares  : 1945  MRN: [id number]    Date of Admission: 2024  Date of Discharge:  24  Primary Care Physician: Annmarie David APRN    Consultants:  -Cardiology: Dr. Jermaine Powell  -Orthopedic Surgery: Dr. Adrien Ordaz    [facility] Problems:  Closed 2 part intertrochanteric fracture proximal end of right femur, initial encounter s/p right hip trochanteric nailing with intramedullary hip screw  Chronic paroxysmal atrial fibrillation on Eliquis as an outpatient, acutely in RVR  Elevated troponin  Acute urinary retention  Hypothyroidism  Hyperlipidemia  Mitral regurgitation    Hospital Course     Hospital Course:  Maria Del Rosario Colmenares is a 78 y.o. female  with chronic paroxysmal atrial fibrillation on Eliquis (last dose 2024), hypothyroidism and hyperlipidemia that presented to the ED after mechanical fall.  Patient was found to have a right intertrochanteric proximal femur fracture.  Hospitalist service contacted for further evaluation and management.  Orthopedic surgery consulted.  Patient was noted to be in A-fib with RVR, she was given IV dose of Cardizem but heart rate remained elevated.  Cardiology consulted to assist in care.  Cardizem drip started.  Patient had right hip trochanteric nailing with intramedullary hip screw done on 2024 and tolerated procedure well without any acute postprocedural complications.  Cardizem drip able to be weaned.  Patient did have issues with urinary retention during hospitalization requiring Barr catheter insertion, ambulatory referral to for urology outpatient evaluation for urinary retention placed at time of discharge.  On day of discharge patient hemodynamically stable and no additional inpatient evaluation or workup necessary at this time, patient will discharge to [facility] manage to follow-up with PCP, cardiology, Orthopedic  Surgery and urology as an outpatient.    DISCHARGE Follow Up Recommendations for labs and diagnostics:   -Follow-up with PCP after discharge from renal facility  -Follow-up with cardiology in 2 weeks  -Follow-up with Orthopedic Surgery in 2 weeks  -Amatory referral to urology for urinary retention made    Day of Discharge     Vital Signs:  Temp:  [97.3 °F (36.3 °C)-98.6 °F (37 °C)] 98.2 °F (36.8 °C)  Heart Rate:  [] 92  Resp:  [18] 18  BP: (111-128)/(55-92) 122/71  Flow (L/min):  [2] 2  Physical Exam:   Gen: No acute distress, lying bed, pleasant, conversant  Resp: CTAB, No w/r/r, good aeration, equal chest rise bilaterally  Card: IRIR, systolic murmur appreciated  Abd: Soft, Nontender, Nondistended, + bowel sounds     Discharge Details        Discharge Medications        New Medications        Instructions Start Date   acetaminophen 325 MG tablet  Commonly known as: TYLENOL   650 mg, Oral, Every 4 Hours PRN      HYDROcodone-acetaminophen 7.5-325 MG per tablet  Commonly known as: NORCO   1 tablet, Oral, Every 6 Hours PRN      sennosides-docusate 8.6-50 MG per tablet  Commonly known as: PERICOLACE   2 tablets, Oral, 2 Times Daily PRN             Continue These Medications        Instructions Start Date   Aspirin Adult Low Dose 81 MG EC tablet  Generic drug: aspirin   No dose, route, or frequency recorded.      Eliquis 5 MG tablet tablet  Generic drug: apixaban   5 mg, Oral, 2 times daily      furosemide 20 MG tablet  Commonly known as: LASIX   20 mg, Oral, Daily      metoprolol succinate XL 25 MG 24 hr tablet  Commonly known as: TOPROL-XL   25 mg, Oral, 2 times daily      potassium chloride 10 MEQ CR tablet   10 mEq, Oral, Daily      rosuvastatin 20 MG tablet  Commonly known as: CRESTOR   Take 1 tablet by mouth Every Night.      Synthroid 75 MCG tablet  Generic drug: levothyroxine   75 mcg, Oral, Every Early Morning      vitamin D 1.25 MG (34175 UT) capsule capsule  Commonly known as: ERGOCALCIFEROL   50,000  Units, Oral, Every 7 Days, TUESDAYS               No Known Allergies    Discharge Disposition:  Rehab Facility or Unit (DC - External)    Diet:  Hospital:  Diet Order   Procedures    Diet: Regular/House Diet; Texture: Regular Texture (IDDSI 7); Fluid Consistency: Thin (IDDSI 0)       Discharge Activity:   Activity Instructions       Other Activity Instructions      Activity Instructions: Weightbearing as tolerated with walker            CODE STATUS:  Code Status and Medical Interventions:   Ordered at: 02/04/24 1630     Level Of Support Discussed With:    Patient     Code Status (Patient has no pulse and is not breathing):    CPR (Attempt to Resuscitate)     Medical Interventions (Patient has pulse or is breathing):    Full Support       Future Appointments   Date Time Provider Department Center   2/19/2024  2:45 PM Mick Abdalla PA [facility]   7/1/2024 11:45 AM Jermaine Powell MD [facility]       Additional Instructions for the Follow-ups that You Need to Schedule       Discharge Follow-up with PCP   As directed       Currently Documented PCP:    Annmarie David APRN    PCP Phone Number:    [phone]     Follow Up Details: Follow-up in 3-5 days        Discharge Follow-up with Specified Provider: Dr. Adrien Ordaz; 2 Weeks   As directed      To: Dr. Adrien Ordaz   Follow Up: 2 Weeks        Discharge Follow-up with Specified Provider: Dr. Jermaine Powell; 2 Weeks   As directed      To: Dr. Jermaine Powell   Follow Up: 2 Weeks                Pertinent  and/or Most Recent Results     PROCEDURES:   -Right hip trochanteric nailing with intramedullary hip screw (02/05/2024)     RADIOLOGY:  CT Head Without Contrast [[id number]] Ethan as Reviewed   Order Status: Completed Collected: 02/04/24 1633    Updated: 02/04/24 1636   Narrative:     PROCEDURE: CT HEAD WO CONTRAST     COMPARISON: 07/29/2022 and prior     INDICATIONS: fall/HEAD TRAUMA/AMS     PROTOCOL:   Standard imaging protocol  performed     RADIATION:   DLP: 1169.2 mGy*cm    MA and/or KV was adjusted to minimize radiation dose.        TECHNIQUE: After obtaining the patient's consent, CT images were obtained without non-ionic  intravenous contrast material.     FINDINGS:  Brain:  There is mild diffuse atrophy.  No suspicious extra-axial fluid collections are noted.  No  acute intracranial hemorrhage or mass effect.  Patchy and confluent white matter changes are  similar to the prior study.  Evolution of previously noted left MCA infarct within the left  parietal lobe.  Gray and white matter differentiation is maintained.     Orbits:  Orbits are grossly unremarkable and periorbital soft tissues appear grossly unremarkable.     Sinuses:  Paranasal sinuses are clear.  Mastoid air cells are clear.     Calvarium and soft tissues:  Bony calvarium is intact.  Soft tissues are grossly unremarkable in  appearance.         Impression:       1. Atrophy and chronic white matter changes which appear stable from prior exam.  Evolution of the  previously described left MCA infarct on prior MRI  2. No acute intracranial abnormality noted            QUENTIN GUSMAN MD        Electronically Signed and Approved By: QUENTIN GUSMAN MD on 2/04/2024 at 16:32                   XR Chest 1 View [[id number]] Ethan as Reviewed   Order Status: Completed Collected: 02/04/24 1400    Updated: 02/04/24 1403   Narrative:     PROCEDURE: XR CHEST 1 VW     COMPARISON: [facility], , XR CHEST 1 VW, 7/29/2022, 9:27.     INDICATIONS: FALL     FINDINGS:  Study is limited by overlying support and monitoring apparatus.  The heart and mediastinal contours  appear within normal limits.  Interfaces from overlying skin folds noted bilaterally.  The lungs  appear to be grossly clear.  Osseous structures demonstrate no acute abnormality      Impression:       1. Limited negative portable chest                  QUENTIN GUSMAN MD        Electronically Signed and Approved  By: QUENTIN GUSMAN MD on 2/04/2024 at 14:00                   XR Shoulder 2+ View Left [[id number]] Ethan as Reviewed   Order Status: Completed Collected: 02/04/24 1407    Updated: 02/04/24 1410   Narrative:     PROCEDURE: XR SHOULDER 2+ VW LEFT     COMPARISON: None     INDICATIONS: LEFT GENERAL SHOULDER PAIN POST FALL     FINDINGS:  There is no acute fracture or dislocation noted.  No significant degenerative change appreciated at  the AC joint or glenohumeral joint.  Limited imaging of the chest is grossly unremarkable in  appearance.  Minimal induration of the soft tissues overlying the left shoulder may represent a  small contusion given history      Impression:       1. Mild soft tissue swelling with no definite acute osseous abnormality               QUENTIN GUSMAN MD        Electronically Signed and Approved By: QUENTIN GUSMAN MD on 2/04/2024 at 14:07                   XR Hips Bilateral With or Without Pelvis 2 View [[id number]] Ethan as Reviewed   Order Status: Completed Collected: 02/04/24 1403    Updated: 02/04/24 1406   Narrative:     PROCEDURE: XR HIPS BILATERAL W OR WO PELVIS 2 VIEW     COMPARISON: [facility]  , LEFT HIP/PELVIS, 4/15/2021, 10:33.     INDICATIONS: BILATERAL HIP PAIN POST FALL. LIMITED MOBILITY OF RIGHT LEG     FINDINGS:  There is a comminuted intratrochanteric fracture of the proximal right femur with extension into  the lesser and greater trochanter.  The hip joint is maintained in articulation.  Mild degenerative  changes are noted at the right hip.  The bony pelvis appears to be intact evaluation of the sacrum  and coccyx is limited by osteopenia as well as overlying bowel gas and fecal material.  Left hip  demonstrates mild degenerative changes with no definite acute fracture or dislocation on limited  imaging.      Impression:       1. Comminuted intratrochanteric fracture proximal right femur  2. No definite acute abnormality on limited imaging of the left hip                QUENTIN GUSMAN MD        Electronically Signed and Approved By: QUENTIN GUSMAN MD on 2/04/2024 at 14:03       LAB RESULTS:      Lab 02/08/24 0416 02/07/24 0416 02/06/24 0406 02/05/24 0418 02/04/24  1334   WBC 8.10 9.21 8.90 12.01* 14.33*   HEMOGLOBIN 9.8* 9.9* 10.4* 11.4* 12.1   HEMATOCRIT 30.7* 31.6* 32.4* 35.3 36.6   PLATELETS 202 190 145 169 196   NEUTROS ABS  --   --   --  9.20* 12.23*   IMMATURE GRANS (ABS)  --   --   --  0.09* 0.07*   LYMPHS ABS  --   --   --  1.39 0.94   MONOS ABS  --   --   --  1.30* 1.07*   EOS ABS  --   --   --  0.00 0.00   MCV 93.6 95.2 93.1 91.5 90.8         Lab 02/08/24 0416 02/07/24 0416 02/06/24 0406 02/05/24 0418 02/04/24  1334   SODIUM 132* 134* 136 134* 139   POTASSIUM 4.5 4.3 4.8 4.8 3.8   CHLORIDE 97* 99 102 100 100   CO2 27.5 23.6 23.2 23.4 24.4   ANION GAP 7.5 11.4 10.8 10.6 14.6   BUN 25* 35* 26* 27* 24*   CREATININE 0.88 1.12* 0.82 0.95 1.06*   EGFR 67.4 50.4* 73.3 61.5 53.9*   GLUCOSE 121* 140* 165* 185* 256*   CALCIUM 8.9 8.8 8.6 9.0 9.3   MAGNESIUM 2.0 2.2 2.2  --  2.0   PHOSPHORUS  --   --  3.4  --   --          Lab 02/04/24  1334   TOTAL PROTEIN 6.7   ALBUMIN 4.1   GLOBULIN 2.6   ALT (SGPT) 34*   AST (SGOT) 61*   BILIRUBIN 0.9   ALK PHOS 86   LIPASE 9*         Lab 02/04/24  1620 02/04/24  1334   PROBNP  --  15,823.0*   HSTROP T 73* 82*                 Brief Urine Lab Results       None          Microbiology Results (last 10 days)       ** No results found for the last 240 hours. **            Results for orders placed during the hospital encounter of 02/04/24    Adult Transthoracic Echo Complete w/ Color, Spectral and Contrast if necessary per protocol    Interpretation Summary    Left ventricular systolic function is normal. Left ventricular ejection fraction appears to be 51 - 55%.    Left ventricular diastolic function was indeterminate due to atrial fibrillation.    The left atrial cavity is mildly dilated.    There is trace to mild mitral  regurgitation.    Mild tricuspid valve regurgitation is present.  Estimated right ventricular systolic pressure from tricuspid regurgitation is normal (<35 mmHg).        Time spent on Discharge including face to face service:  35 minutes    Electronically signed by Elvin Bales MD, 02/08/24, 7:39 AM EST.

## 2024-02-11 LAB
QT INTERVAL: 293 MS
QTC INTERVAL: 440 MS

## 2024-02-19 ENCOUNTER — OFFICE VISIT (OUTPATIENT)
Dept: ORTHOPEDIC SURGERY | Facility: CLINIC | Age: 79
End: 2024-02-19
Payer: MEDICARE

## 2024-02-19 VITALS
DIASTOLIC BLOOD PRESSURE: 76 MMHG | HEART RATE: 88 BPM | OXYGEN SATURATION: 92 % | WEIGHT: 152.34 LBS | SYSTOLIC BLOOD PRESSURE: 118 MMHG | BODY MASS INDEX: 23.91 KG/M2 | HEIGHT: 67 IN

## 2024-02-19 DIAGNOSIS — M25.551 RIGHT HIP PAIN: ICD-10-CM

## 2024-02-19 DIAGNOSIS — Z47.89 AFTERCARE FOLLOWING SURGERY OF THE MUSCULOSKELETAL SYSTEM: Primary | ICD-10-CM

## 2024-02-19 PROCEDURE — 1160F RVW MEDS BY RX/DR IN RCRD: CPT | Performed by: PHYSICIAN ASSISTANT

## 2024-02-19 PROCEDURE — 1159F MED LIST DOCD IN RCRD: CPT | Performed by: PHYSICIAN ASSISTANT

## 2024-02-19 PROCEDURE — 99024 POSTOP FOLLOW-UP VISIT: CPT | Performed by: PHYSICIAN ASSISTANT

## 2024-02-19 NOTE — PROGRESS NOTES
"Chief Complaint  Pain and Follow-up of the Right Hip and Suture / Staple Removal    Subjective          History of Present Illness      Maria Del Rosario Colmenares is a 78 y.o. female  presents to [facility] for     Patient presents for 2-week postop evaluation of right hip trochanteric nailing with intramedullary hip screw, 2/5/2024 she is here with her daughter Ailyn.  Patient is staying at [facility].  Patient and daughter state her pain is being treated with pain medicine patient states her pain is about a 7 out of 10.  She states they have been ambulating with her with a walker at her care facility.  She states she has not been receiving care for range of motion of her hip.  She had staples removed today and Steri-Strips placed they deny redness drainage or swelling.  She presents in a wheelchair.  No Known Allergies     Social History     Socioeconomic History    Marital status:    Tobacco Use    Smoking status: Former    Smokeless tobacco: Never   Vaping Use    Vaping Use: Never used   Substance and Sexual Activity    Alcohol use: Yes     Comment: CURRENT SOME DAY    Drug use: Never    Sexual activity: Defer        REVIEW OF SYSTEMS    Constitutional: Awake alert and oriented x3, no acute distress, denies fevers, chills, weight loss  Respiratory: No respiratory distress  Vascular: Brisk cap refill, Intact distal pulses, No cyanosis, compartments soft with no signs or symptoms of compartment syndrome or DVT.   Cardiovascular: Denies chest pain, shortness of breath  Skin: Denies rashes, acute skin changes  Neurologic: Denies headache, loss of consciousness  MSK: Right hip pain      Objective   Vital Signs:   /76   Pulse 88   Ht 170.2 cm (67\")   Wt 69.1 kg (152 lb 5.4 oz)   SpO2 92%   BMI 23.86 kg/m²     Body mass index is 23.86 kg/m².    Physical Exam       Right hip: Incisions are healing well, no erythema, no ecchymosis or swelling, no drainage or signs of infection.  " Active flexion 90, mild pain with rotation, negative Cesario testing, patient unable to hold straight leg raise due to weakness, knee range of motion appropriate, nontender calf, negative Cesario testing      Procedures    Imaging Results (Most Recent)       Procedure Component Value Units Date/Time    XR Hip With or Without Pelvis 2 - 3 View Right [[id number]] Resulted: 02/19/24 1528     Updated: 02/19/24 1529    Narrative:      View:AP/Lateral view(s)  Site: Right hip  Indication: Hip pain  Study: X-rays ordered, taken in the office, and reviewed today  X-ray: Healing proximal femur fracture with intact kylah and screws, no   signs of hardware failure or loosening, no subsidence or periprosthetic   fracture, good alignment  Comparative data: Intraoperative studies             Result Review :   The following data was reviewed by: TUSHAR Cash on 02/19/2024:  Data reviewed : Radiologic studies reviewed by me with the patient              Assessment and Plan    Diagnoses and all orders for this visit:    1. Aftercare following surgery of Right HIP TROCHANTERIC NAILING WITH INTRAMEDULLARY HIP SCREW 2/5/24 (Primary)    2. Right hip pain  -     XR Hip With or Without Pelvis 2 - 3 View Right        X-rays with the patient and her daughter discussed diagnosis and treatment options with them.  Patient was given an order to have an x-ray performed prior to next visit from [facility].  Patient was also advised weightbearing as tolerated she was given order to continue therapy for weightbearing and range of motion as tolerated.  Continue pain medication, sutures/staples removed in office today. Steri-strips applied. Discussed incision care/hygiene. May shower, but do not submerge in water until fully healed.  Advised patient that if any concerning symptoms regarding incision appearance occur that they should call us right away, patient expressed understanding.  Follow-up in 4 weeks with x-rays prior to next  visit    Call or return if worsening symptoms.    Follow Up   Return in about 4 weeks (around 3/18/2024) for Recheck.  Patient was given instructions and counseling regarding her condition or for health maintenance advice. Please see specific information pulled into the AVS if appropriate.       EMR Dragon/Transcription disclaimer:  Much of this encounter note is an electronic transcription/translation of spoken language to printed text, aka voice recognition.  The electronic translation of spoken language may permit erroneous or at times nonsensical words or phrases to be inadvertently transcribed; although I have reviewed the note for such errors, some may still exist so please interpret based on surrounding text content.

## 2024-02-25 ENCOUNTER — LAB REQUISITION (OUTPATIENT)
Dept: LAB | Facility: HOSPITAL | Age: 79
End: 2024-02-25
Payer: MEDICARE

## 2024-02-25 DIAGNOSIS — N39.0 URINARY TRACT INFECTION, SITE NOT SPECIFIED: ICD-10-CM

## 2024-02-25 LAB
BACTERIA UR QL AUTO: ABNORMAL /HPF
BILIRUB UR QL STRIP: ABNORMAL
CLARITY UR: ABNORMAL
COLOR UR: YELLOW
GLUCOSE UR STRIP-MCNC: NEGATIVE MG/DL
HGB UR QL STRIP.AUTO: ABNORMAL
KETONES UR QL STRIP: ABNORMAL
LEUKOCYTE ESTERASE UR QL STRIP.AUTO: ABNORMAL
NITRITE UR QL STRIP: POSITIVE
PH UR STRIP.AUTO: 8.5 [PH] (ref 5–8)
PROT UR QL STRIP: ABNORMAL
RBC # UR STRIP: ABNORMAL /HPF
REF LAB TEST METHOD: ABNORMAL
SP GR UR STRIP: 1.01 (ref 1–1.03)
SQUAMOUS #/AREA URNS HPF: ABNORMAL /HPF
TRI-PHOS CRY URNS QL MICRO: ABNORMAL /HPF
UROBILINOGEN UR QL STRIP: ABNORMAL
WBC # UR STRIP: ABNORMAL /HPF

## 2024-02-25 PROCEDURE — 81001 URINALYSIS AUTO W/SCOPE: CPT | Performed by: NURSE PRACTITIONER

## 2024-02-25 PROCEDURE — 87086 URINE CULTURE/COLONY COUNT: CPT | Performed by: NURSE PRACTITIONER

## 2024-02-25 PROCEDURE — 87186 SC STD MICRODIL/AGAR DIL: CPT | Performed by: NURSE PRACTITIONER

## 2024-02-25 PROCEDURE — 87077 CULTURE AEROBIC IDENTIFY: CPT | Performed by: NURSE PRACTITIONER

## 2024-02-27 LAB — BACTERIA SPEC AEROBE CULT: ABNORMAL

## 2024-02-28 ENCOUNTER — OFFICE VISIT (OUTPATIENT)
Dept: UROLOGY | Facility: CLINIC | Age: 79
End: 2024-02-28
Payer: MEDICARE

## 2024-02-28 VITALS
HEART RATE: 100 BPM | WEIGHT: 152 LBS | BODY MASS INDEX: 23.86 KG/M2 | DIASTOLIC BLOOD PRESSURE: 64 MMHG | SYSTOLIC BLOOD PRESSURE: 118 MMHG | TEMPERATURE: 98.2 F | HEIGHT: 67 IN

## 2024-02-28 DIAGNOSIS — Z97.8 PRESENCE OF INDWELLING FOLEY CATHETER: ICD-10-CM

## 2024-02-28 DIAGNOSIS — R26.89 IMPAIRED GAIT AND MOBILITY: ICD-10-CM

## 2024-02-28 DIAGNOSIS — R33.9 URINARY RETENTION: Primary | ICD-10-CM

## 2024-02-28 PROBLEM — T83.89XA: Status: ACTIVE | Noted: 2024-02-28

## 2024-02-28 LAB
BILIRUB BLD-MCNC: NEGATIVE MG/DL
CLARITY, POC: CLEAR
COLOR UR: YELLOW
EXPIRATION DATE: ABNORMAL
GLUCOSE UR STRIP-MCNC: NEGATIVE MG/DL
KETONES UR QL: NEGATIVE
LEUKOCYTE EST, POC: ABNORMAL
Lab: ABNORMAL
NITRITE UR-MCNC: NEGATIVE MG/ML
PH UR: 7 [PH] (ref 5–8)
PROT UR STRIP-MCNC: NEGATIVE MG/DL
RBC # UR STRIP: ABNORMAL /UL
SP GR UR: 1.02 (ref 1–1.03)
UROBILINOGEN UR QL: ABNORMAL

## 2024-02-28 RX ORDER — POTASSIUM CHLORIDE 750 MG/1
10 TABLET, EXTENDED RELEASE ORAL DAILY
COMMUNITY
Start: 2024-02-23

## 2024-02-28 RX ORDER — BETHANECHOL CHLORIDE 25 MG/1
25 TABLET ORAL 3 TIMES DAILY
COMMUNITY
Start: 2024-02-26

## 2024-02-28 RX ORDER — HYDROCODONE BITARTRATE AND ACETAMINOPHEN 7.5; 325 MG/1; MG/1
1 TABLET ORAL EVERY 6 HOURS PRN
COMMUNITY
Start: 2024-02-22

## 2024-02-28 RX ORDER — NITROFURANTOIN 25; 75 MG/1; MG/1
100 CAPSULE ORAL 2 TIMES DAILY
COMMUNITY
Start: 2024-02-26

## 2024-02-28 RX ORDER — SULFAMETHOXAZOLE AND TRIMETHOPRIM 800; 160 MG/1; MG/1
1 TABLET ORAL 2 TIMES DAILY
COMMUNITY
Start: 2024-02-27

## 2024-02-28 NOTE — PROGRESS NOTES
"Chief Complaint  Urinary Retention (PT STATES UNABLE TO URINATE/PT WAS RELEASED FROM REHAB AFTER DISCHARGE SHE WAS TOLD TO FOLLOW/UP WITH UROLOGIST /)    Subjective          Maria Del Rosario Colmenares is a 78 y.o. female who presents to [facility]  History of Present Illness  Referral per Elvin Bales MD [facility] ER visit for diagnosis of urinary retention. Referral placed as urgent referral initially to Dr Owens on 2/8/2024 for acute urinary retention.  Patient brought to visit per Daughter. According to daughter patient discharged home from Rehab facility near \"[place] \"2-3\" weeks ago. Patient lives alone and cares for herself and using walker.  In town and plans of staying with patient a few days to help her. Reports that patient fell and was found down at her home \"had been laying there for hours\". Catheter placed while in hospital and still had problem with urinating after surgery. Patient receives home care visits per nurse and guaman catheter just changed today.  Daughter relayed primary reason for visit  today was to find out cause of why kidney not working and why patient unable to urinate.  When mentioning possibility of removing catheter, Daughter reporting several attempts per rehab facility to remove catheter unsuccessful. Denies knowledge of any problems with urinary retention prior to recent acute onset while hospitalized s/p fall and surgery for femur fracture.  Daughter answering questions and unsure of patient cognitive status. Smiles and minimally verbal except for yes and no answers during conversation with daughter.  Daughter states patient was treated for urinary infection several weeks ago and told another uti recently. Urine collection per home care nurse visit and guaman catheter just changed. antibiotic started \"couple of days ago\" Did not know name of medication.  Daughter not wanting to consider catheter removal today stating if taken out today would be \"back in ER " "tomorrow.\" I was also in agreement now knowing attempts failed and need for further evaluation. Discussed cystoscopy which would be performed per Dr Thorpe and needs scheduled for when returns. Could evaluate if problem with bladder and attempt removal.  Could look into bladder and fill bladder along with uroflow/usr to see if able to urinate.      Copied from notes referral date ER 2024 below:                                                                        HOSPITALIST  DISCHARGE SUMMARY     Patient Name: Maria Del Rosario Colmenares  : 1945  MRN: [id number]     Date of Admission: 2024  Date of Discharge:  24  Primary Care Physician: Annmarie David, AIDA     Consultants:  -Cardiology: Dr. Jermaine Powell  -Orthopedic Surgery: Dr. Adrien Ordaz     [facility] Problems:  Closed 2 part intertrochanteric fracture proximal end of right femur, initial encounter s/p right hip trochanteric nailing with intramedullary hip screw  Chronic paroxysmal atrial fibrillation on Eliquis as an outpatient, acutely in RVR  Elevated troponin  Acute urinary retention  Hypothyroidism  Hyperlipidemia  Mitral regurgitation     Hospital Course     Hospital Course:  Maria Del Rosario Colmenares is a 78 y.o. female  with chronic paroxysmal atrial fibrillation on Eliquis (last dose 2024), hypothyroidism and hyperlipidemia that presented to the ED after mechanical fall.  Patient was found to have a right intertrochanteric proximal femur fracture.  Hospitalist service contacted for further evaluation and management.  Orthopedic surgery consulted.  Patient was noted to be in A-fib with RVR, she was given IV dose of Cardizem but heart rate remained elevated.  Cardiology consulted to assist in care.  Cardizem drip started.  Patient had right hip trochanteric nailing with intramedullary hip screw done on 2024 and tolerated procedure well without any acute postprocedural complications.  Cardizem drip able to be weaned.  Patient " "did have issues with urinary retention during hospitalization requiring Barr catheter insertion, ambulatory referral to for urology outpatient evaluation for urinary retention placed at time of discharge.  On day of discharge patient hemodynamically stable and no additional inpatient evaluation or workup necessary at this time, patient will discharge to [facility] rehab facility manage to follow-up with PCP, cardiology, Orthopedic Surgery and urology as an outpatient.     DISCHARGE Follow Up Recommendations for labs and diagnostics:   -Follow-up with PCP after discharge from renal facility  -Follow-up with cardiology in 2 weeks  -Follow-up with Orthopedic Surgery in 2 weeks  -Amatory referral to urology for urinary retention made         Progress Notes by Elvin Bales MD (02/06/2024 12:52)       Objective   Vital Signs:   /64 (BP Location: Right arm, Patient Position: Sitting, Cuff Size: Adult)   Pulse 100   Temp 98.2 °F (36.8 °C) (Oral)   Ht 170.2 cm (67\")   Wt 68.9 kg (152 lb)   BMI 23.81 kg/m²     No Known Allergies   Past medical history:  has a past medical history of Arthritis, Chronic combined systolic (congestive) and diastolic (congestive) heart failure, Hypothyroidism, Mixed hyperlipidemia, Paroxysmal atrial fibrillation, SOB (shortness of breath), and Thyroid disorder.   Past surgical history:  has a past surgical history that includes Appendectomy; Colonoscopy; Hysterectomy; Abdominal hysterectomy; and Hip Trochanteric Nailing (Right, 2/5/2024).  Personal history: family history includes Lung cancer in her mother and paternal grandmother; No Known Problems in her father.  Social history:  reports that she has quit smoking. She has been exposed to tobacco smoke. She has never used smokeless tobacco. She reports current alcohol use. She reports that she does not use drugs.    Review of Systems   Constitutional:  Negative for chills and fever.        Physical Exam  Vitals and nursing note " reviewed.   Constitutional:       General: She is not in acute distress.     Appearance: Normal appearance. She is well-developed. She is not ill-appearing.      Comments: W/C   Cardiovascular:      Rate and Rhythm: Normal rate.   Pulmonary:      Effort: Pulmonary effort is normal.      Breath sounds: Normal air entry.   Musculoskeletal:      Right lower leg: Edema present.      Left lower leg: Edema present.   Skin:     General: Skin is warm and dry.   Neurological:      General: No focal deficit present.      Motor: Motor function is intact.      Comments: Oriented    Psychiatric:         Mood and Affect: Mood normal.         Behavior: Behavior normal. Behavior is cooperative.        Result Review :          Results for orders placed or performed in visit on 02/28/24   POC Urinalysis Dipstick, Automated    Specimen: Urine   Result Value Ref Range    Color Yellow Yellow, Straw, Dark Yellow, Carmen    Clarity, UA Clear Clear    Specific Gravity  1.025 1.005 - 1.030    pH, Urine 7.0 5.0 - 8.0    Leukocytes Trace (A) Negative    Nitrite, UA Negative Negative    Protein, POC Negative Negative mg/dL    Glucose, UA Negative Negative mg/dL    Ketones, UA Negative Negative    Urobilinogen, UA 0.2 E.U./dL Normal, 0.2 E.U./dL    Bilirubin Negative Negative    Blood, UA Small (A) Negative    Lot Number [id number]     Expiration Date 11-        MRI Brain Without Contrast (07/29/2022 20:35)    CT Head Without Contrast (02/04/2024 15:25)   Assessment and Plan    Diagnoses and all orders for this visit:    1. Urinary retention ( acute)  -     POC Urinalysis Dipstick, Automated    2. Presence of indwelling Barr catheter    3.  Impaired gait and mobility    4, History of abnormal imaging of brain 7/2022 indicating left MCA infarct.         Unable to complete physical assessment and past urological history. Daughter needing to leave and aware visit not complete. Stated will call to schedule visit when doctor back. Information  regarding stay and discharge from rehab facility not included as part of initial ER urgent referral or scheduling and chart history. Appointment rescheduled per patient convenience to today per Daughter availability. Information regarding rehab stay per daughter today.  Recommendation of scheduling cystoscopy in office with Dr Thorpe to evaluate bladder and attempt removal guaman catheter at that time.     Patient receiving home care nursing visits and guaman changes currently as reported per daughter.     Reviewing of scheduling/referral history. Multiple attempts to reach for appointment. Schedule 4pm today per daughter only available time.          Follow Up   No follow-ups on file.  Patient was given instructions and counseling regarding her condition or for health maintenance advice. Please see specific information pulled into the AVS if appropriate.     Libby Guerrero, AIDA

## 2024-03-18 ENCOUNTER — OFFICE VISIT (OUTPATIENT)
Dept: ORTHOPEDIC SURGERY | Facility: CLINIC | Age: 79
End: 2024-03-18
Payer: MEDICARE

## 2024-03-18 VITALS
DIASTOLIC BLOOD PRESSURE: 65 MMHG | HEART RATE: 88 BPM | OXYGEN SATURATION: 93 % | HEIGHT: 67 IN | BODY MASS INDEX: 23.84 KG/M2 | WEIGHT: 151.9 LBS | SYSTOLIC BLOOD PRESSURE: 141 MMHG

## 2024-03-18 DIAGNOSIS — Z47.89 AFTERCARE FOLLOWING SURGERY OF THE MUSCULOSKELETAL SYSTEM: Primary | ICD-10-CM

## 2024-03-18 DIAGNOSIS — M25.551 RIGHT HIP PAIN: ICD-10-CM

## 2024-03-18 PROCEDURE — 1160F RVW MEDS BY RX/DR IN RCRD: CPT | Performed by: PHYSICIAN ASSISTANT

## 2024-03-18 PROCEDURE — 1159F MED LIST DOCD IN RCRD: CPT | Performed by: PHYSICIAN ASSISTANT

## 2024-03-18 PROCEDURE — 99024 POSTOP FOLLOW-UP VISIT: CPT | Performed by: PHYSICIAN ASSISTANT

## 2024-03-18 RX ORDER — PHENAZOPYRIDINE HYDROCHLORIDE 100 MG/1
TABLET, FILM COATED ORAL
COMMUNITY
Start: 2024-03-05

## 2024-03-18 RX ORDER — BUMETANIDE 2 MG/1
1 TABLET ORAL DAILY
COMMUNITY
Start: 2024-03-04

## 2024-03-18 NOTE — PROGRESS NOTES
"Chief Complaint  Pain and Follow-up of the Right Hip    Subjective          History of Present Illness      Maria Del Rosario Colmenares is a 78 y.o. female  presents to [facility] for     Patient presents for follow-up evaluation of right hip trochanteric nailing with intramedullary hip screw, 2/5/2024.  She states she has been home for about 2 weeks.  She is using a walker for safety she presents with that today.  She has home health physical therapy coming twice a week.  If she has pain she points to the lateral hip she states she has been getting around well with a walker.  She states she is getting stronger, she states the incisions have healed well.  She denies need for pain medicine or NSAIDs      No Known Allergies     Social History     Socioeconomic History    Marital status:    Tobacco Use    Smoking status: Former     Passive exposure: Past    Smokeless tobacco: Never   Vaping Use    Vaping status: Never Used   Substance and Sexual Activity    Alcohol use: Yes     Comment: CURRENT SOME DAY    Drug use: Never    Sexual activity: Defer        REVIEW OF SYSTEMS    Constitutional: Awake alert and oriented x3, no acute distress, denies fevers, chills, weight loss  Respiratory: No respiratory distress  Vascular: Brisk cap refill, Intact distal pulses, No cyanosis, compartments soft with no signs or symptoms of compartment syndrome or DVT.   Cardiovascular: Denies chest pain, shortness of breath  Skin: Denies rashes, acute skin changes  Neurologic: Denies headache, loss of consciousness  MSK: Right hip pain      Objective   Vital Signs:   /65   Pulse 88   Ht 170.2 cm (67\")   Wt 68.9 kg (151 lb 14.4 oz)   SpO2 93%   BMI 23.79 kg/m²     Body mass index is 23.79 kg/m².    Physical Exam       Right hip: Well-healed incisions, no erythema, no drainage or dehiscence, no signs of infection, active flexion 120, no pain with rotation, negative straight leg raise, 5 out of 5 strength, " no pain with resisted range of motion, nontender calf, leg lengths are equal      Procedures    Imaging Results (Most Recent)       Procedure Component Value Units Date/Time    XR Hip With or Without Pelvis 2 - 3 View Right [[id number]] Resulted: 03/18/24 1154     Updated: 03/18/24 1155    Narrative:      View:AP/Lateral view(s)  Site: Right hip  Indication: Right hip pain  Study: X-rays ordered, taken in the office, and reviewed today  X-ray: Good healing of proximal femur fracture with intact kylah and screws,   no signs of hardware failure or loosening, no subsidence or periprosthetic   fracture, good alignment  Comparative data: Previous studies             Result Review :   The following data was reviewed by: TUSHAR Cash on 03/18/2024:  Data reviewed : Radiologic studies reviewed by me with the patient              Assessment and Plan    Diagnoses and all orders for this visit:    1. Aftercare following surgery of Right HIP TROCHANTERIC NAILING WITH INTRAMEDULLARY HIP SCREW 2/5/24 (Primary)    2. Right hip pain  -     XR Hip With or Without Pelvis 2 - 3 View Right        Reviewed x-rays with the patient discussed diagnosis and treatment options with her she was advised to continue home health physical therapy for strength and range of motion, continue weightbearing and activity as tolerated follow-up and 6 weeks for recheck with x-rays    Call or return if worsening symptoms.    Follow Up   Return in about 6 weeks (around 4/29/2024) for Recheck.  Patient was given instructions and counseling regarding her condition or for health maintenance advice. Please see specific information pulled into the AVS if appropriate.       EMR Dragon/Transcription disclaimer:  Much of this encounter note is an electronic transcription/translation of spoken language to printed text, aka voice recognition.  The electronic translation of spoken language may permit erroneous or at times nonsensical words or phrases to  be inadvertently transcribed; although I have reviewed the note for such errors, some may still exist so please interpret based on surrounding text content.

## 2024-05-02 ENCOUNTER — OFFICE VISIT (OUTPATIENT)
Dept: ORTHOPEDIC SURGERY | Facility: CLINIC | Age: 79
End: 2024-05-02
Payer: MEDICARE

## 2024-05-02 VITALS
HEIGHT: 67 IN | BODY MASS INDEX: 23.84 KG/M2 | SYSTOLIC BLOOD PRESSURE: 151 MMHG | DIASTOLIC BLOOD PRESSURE: 83 MMHG | HEART RATE: 47 BPM | WEIGHT: 151.9 LBS | OXYGEN SATURATION: 90 %

## 2024-05-02 DIAGNOSIS — M25.551 RIGHT HIP PAIN: ICD-10-CM

## 2024-05-02 DIAGNOSIS — Z47.89 AFTERCARE FOLLOWING SURGERY OF THE MUSCULOSKELETAL SYSTEM: Primary | ICD-10-CM

## 2024-05-02 DIAGNOSIS — M25.551 RIGHT HIP PAIN: Primary | ICD-10-CM

## 2024-05-02 RX ORDER — POTASSIUM CHLORIDE 20 MEQ/1
20 TABLET, EXTENDED RELEASE ORAL DAILY
COMMUNITY
Start: 2024-03-28

## 2024-05-02 RX ORDER — FLUTICASONE PROPIONATE AND SALMETEROL 250; 50 UG/1; UG/1
POWDER RESPIRATORY (INHALATION)
COMMUNITY
Start: 2024-03-20

## 2024-05-02 RX ORDER — BUMETANIDE 1 MG/1
TABLET ORAL
COMMUNITY
Start: 2024-03-20

## 2024-05-02 NOTE — PROGRESS NOTES
"Chief Complaint  Pain and Follow-up of the Right Hip    Subjective          History of Present Illness      Maria Del Rosario Colmenares is a 78 y.o. female  presents to [facility] for     Patient presents for follow-up evaluation of right hip trochanteric nailing with intramedullary hip screw, 2/5/2024.  Patient states that she was doing well but states that 3 weeks ago her pain in her hip began to worsen she admits to pain in the lateral hip when she stands or lays or rolls around in her bed and when she walks for too long.  She finished home health physical therapy about a month ago.  She has tried some over-the-counter creams with no relief.  She denies redness drainage or dehiscence of the incision sites she denies injury or trauma she is using her walker for balance and safety.      No Known Allergies     Social History     Socioeconomic History    Marital status:    Tobacco Use    Smoking status: Former     Passive exposure: Past    Smokeless tobacco: Never   Vaping Use    Vaping status: Never Used   Substance and Sexual Activity    Alcohol use: Yes     Comment: CURRENT SOME DAY    Drug use: Never    Sexual activity: Defer        REVIEW OF SYSTEMS    Constitutional: Awake alert and oriented x3, no acute distress, denies fevers, chills, weight loss  Respiratory: No respiratory distress  Vascular: Brisk cap refill, Intact distal pulses, No cyanosis, compartments soft with no signs or symptoms of compartment syndrome or DVT.   Cardiovascular: Denies chest pain, shortness of breath  Skin: Denies rashes, acute skin changes  Neurologic: Denies headache, loss of consciousness  MSK: Right hip pain      Objective   Vital Signs:   /83   Pulse (!) 47   Ht 170.2 cm (67\")   Wt 68.9 kg (151 lb 14.4 oz)   SpO2 90%   BMI 23.79 kg/m²     Body mass index is 23.79 kg/m².    Physical Exam       Right hip: Incisions are well-healed, no erythema, no ecchymosis or swelling, no effusion or " fluctuance, no signs of infection, active flexion 120, no pain with rotation, negative straight leg raise, patient able to straight leg raise, no pain with abduction, adduction, 5 out of 5 strength, no pain with resisted range of motion, patient ambulates with mild antalgic gait with the walker, leg lengths are equal      Procedures    Imaging Results (Most Recent)       Procedure Component Value Units Date/Time    XR Hip With or Without Pelvis 2 - 3 View Right [[id number]] Resulted: 05/02/24 1356     Updated: 05/02/24 1356    Narrative:      View:AP/Lateral view(s)  Site: Right hip  Indication: Right hip pain  Study: X-rays ordered, taken in the office, and reviewed today  X-ray: Good healing of proximal femur fracture with intact kylah and screws,   no signs of hardware failure or loosening, anatomic alignment of joint   space  Comparative data: Previous studies             Result Review :   The following data was reviewed by: TUSHAR Cash on 05/02/2024:  Data reviewed : Radiologic studies reviewed by me with the patient              Assessment and Plan    Diagnoses and all orders for this visit:    1. Aftercare following surgery of Right HIP TROCHANTERIC NAILING WITH INTRAMEDULLARY HIP SCREW 2/5/24 (Primary)    2. Right hip pain  -     XR Hip With or Without Pelvis 2 - 3 View Right        Reviewed x-rays with the patient discussed diagnosis and treatment options with her we discussed restarting therapy she would like to do home exercises she was given these.  She was advised we can prescribe pain cream which she elected to try.  We discussed continuing weightbearing and activity as tolerated follow-up in 1 month for recheck with x-ray    Call or return if worsening symptoms.    Follow Up   Return in about 4 weeks (around 5/30/2024).  Patient was given instructions and counseling regarding her condition or for health maintenance advice. Please see specific information pulled into the AVS if  appropriate.       EMR Dragon/Transcription disclaimer:  Part of this note may be an electronic transcription/translation of spoken language to printed text using the Dragon Dictation System

## 2024-07-01 ENCOUNTER — OFFICE VISIT (OUTPATIENT)
Dept: CARDIOLOGY | Facility: CLINIC | Age: 79
End: 2024-07-01
Payer: MEDICARE

## 2024-07-01 VITALS
BODY MASS INDEX: 21.03 KG/M2 | HEART RATE: 95 BPM | WEIGHT: 134 LBS | SYSTOLIC BLOOD PRESSURE: 125 MMHG | DIASTOLIC BLOOD PRESSURE: 64 MMHG | HEIGHT: 67 IN

## 2024-07-01 DIAGNOSIS — I50.32 CHRONIC DIASTOLIC HEART FAILURE: ICD-10-CM

## 2024-07-01 DIAGNOSIS — I48.11 LONGSTANDING PERSISTENT ATRIAL FIBRILLATION: Primary | ICD-10-CM

## 2024-07-01 DIAGNOSIS — I34.0 NONRHEUMATIC MITRAL VALVE REGURGITATION: ICD-10-CM

## 2024-07-01 PROCEDURE — 99213 OFFICE O/P EST LOW 20 MIN: CPT | Performed by: INTERNAL MEDICINE

## 2024-07-01 PROCEDURE — 1160F RVW MEDS BY RX/DR IN RCRD: CPT | Performed by: INTERNAL MEDICINE

## 2024-07-01 PROCEDURE — 1159F MED LIST DOCD IN RCRD: CPT | Performed by: INTERNAL MEDICINE

## 2024-07-01 RX ORDER — BUMETANIDE 2 MG/1
2 TABLET ORAL DAILY
COMMUNITY

## 2024-07-01 NOTE — PROGRESS NOTES
CARDIOLOGY FOLLOW-UP PROGRESS NOTE        Chief Complaint  Atrial Fibrillation, Follow-up, and Hypertension    Subjective            Maria Del Rosario Colmenares presents to [facility] CARDIOLOGY  History of Present Illness    Ms Colmenares is here for a follow-up visit.  She had an admission to the hospital in February of this year following a fall.  She suffered a right hip fracture and underwent surgical correction.  During hospital stay, she developed rapid ventricular rates and needed Cardizem infusion for a day.  She also had fluid retention.  She was eventually discharged to rehab on higher dose of metoprolol.    Today, she reports feeling fine and almost back to her baseline.  She still needs a cane to walk.  She has no palpitations.  Occasionally she feels shortness of breath.  Currently, she has no pedal edema.  Her medication list is significantly different from discharge medication list.  She is currently on Bumex 2 mg daily and also back on lower dose of metoprolol.    Past History:    (1) Paroxysmal atrial fibrillation. (2) Hypothyroidism. (3) History of perforated appendicitis, status post surgery in 2019. (4) chronic combined heart failure (5) acute stroke involving right MCA territory with expressive aphasia and memory problems.  Episode happened in the setting of holding anticoagulation for epidural injections.     Medical History:  Past Medical History:   Diagnosis Date    Arthritis     Chronic combined systolic (congestive) and diastolic (congestive) heart failure     Hypothyroidism     Mixed hyperlipidemia     Paroxysmal atrial fibrillation     SOB (shortness of breath)     Thyroid disorder        Surgical History: has a past surgical history that includes Appendectomy; Colonoscopy; Hysterectomy; Abdominal hysterectomy; and Hip Trochanteric Nailing (Right, 2/5/2024).     Family History: family history includes Lung cancer in her mother and paternal grandmother; No Known Problems in her  father.     Social History: reports that she has quit smoking. She has been exposed to tobacco smoke. She has never used smokeless tobacco. She reports current alcohol use. She reports that she does not use drugs.    Allergies: Patient has no known allergies.    Current Outpatient Medications on File Prior to Visit   Medication Sig    acetaminophen (TYLENOL) 325 MG tablet Take 2 tablets by mouth Every 4 (Four) Hours As Needed for Mild Pain.    Aspirin Adult Low Dose 81 MG EC tablet     bethanechol (URECHOLINE) 25 MG tablet Take 1 tablet by mouth 3 (Three) Times a Day.    Eliquis 5 MG tablet tablet Take 1 tablet by mouth 2 (two) times a day.    metoprolol succinate XL (TOPROL-XL) 25 MG 24 hr tablet Take 1 tablet by mouth 2 (two) times a day.    potassium chloride (KLOR-CON M20) 20 MEQ CR tablet Take 1 tablet by mouth Daily. with food    rosuvastatin (CRESTOR) 20 MG tablet Take 1 tablet by mouth Every Night.    Synthroid 75 MCG tablet Take 1 tablet by mouth Every Morning.    vitamin D (ERGOCALCIFEROL) 1.25 MG (54246 UT) capsule capsule Take 1 capsule by mouth Every 7 (Seven) Days. TUESDAYS    [DISCONTINUED] bumetanide (BUMEX) 1 MG tablet     bumetanide (BUMEX) 2 MG tablet Take 1 tablet by mouth Daily.    sennosides-docusate (PERICOLACE) 8.6-50 MG per tablet Take 2 tablets by mouth 2 (Two) Times a Day As Needed for Constipation. (Patient not taking: Reported on 7/1/2024)    sulfamethoxazole-trimethoprim (BACTRIM DS,SEPTRA DS) 800-160 MG per tablet Take 1 tablet by mouth 2 (Two) Times a Day. (Patient not taking: Reported on 7/1/2024)         Review of Systems   Respiratory:  Positive for shortness of breath. Negative for cough and wheezing.    Cardiovascular:  Negative for chest pain, palpitations and leg swelling.   Gastrointestinal:  Negative for nausea and vomiting.   Musculoskeletal:  Positive for arthralgias.   Neurological:  Negative for dizziness and syncope.        Objective     /64 (BP Location: Left arm)  "  Pulse 95   Ht 170.2 cm (67\")   Wt 60.8 kg (134 lb)   BMI 20.99 kg/m²       Physical Exam    General : Alert, awake, no acute distress  Neck : Supple, no carotid bruit, no jugular venous distention  CVS : Regular rate and rhythm, systolic murmur present, rubs or gallops  Lungs: Clear to auscultation bilaterally, no crackles or rhonchi  Abdomen: Soft, nontender, bowel sounds heard in all 4 quadrants  Extremities: Warm, well-perfused, no pedal edema,bilateral engorged veins noted    Result Review :     The following data was reviewed by: Jermaine Powell MD on 07/01/2024:    CMP          2/7/2024    04:16 2/8/2024    04:16 2/9/2024    03:00   CMP   Glucose 140  121  131    BUN 35  25  16    Creatinine 1.12  0.88  0.75    EGFR 50.4  67.4  81.6    Sodium 134  132  134    Potassium 4.3  4.5  4.2    Chloride 99  97  97    Calcium 8.8  8.9  8.7    Total Protein   6.4    Albumin   3.3    Globulin   3.1    Total Bilirubin   0.8    Alkaline Phosphatase   84    AST (SGOT)   40    ALT (SGPT)   32    Albumin/Globulin Ratio   1.1    BUN/Creatinine Ratio 31.3  28.4  21.3    Anion Gap 11.4  7.5  10.5      CBC          2/7/2024    04:16 2/8/2024    04:16 2/9/2024    03:00   CBC   WBC 9.21  8.10  7.24    RBC 3.32  3.28  3.58    Hemoglobin 9.9  9.8  10.6    Hematocrit 31.6  30.7  33.3    MCV 95.2  93.6  93.0    MCH 29.8  29.9  29.6    MCHC 31.3  31.9  31.8    RDW 12.7  12.6  12.7    Platelets 190  202  220               Data reviewed: Cardiology studies        Results for orders placed during the hospital encounter of 02/04/24    Adult Transthoracic Echo Complete w/ Color, Spectral and Contrast if necessary per protocol    Interpretation Summary    Left ventricular systolic function is normal. Left ventricular ejection fraction appears to be 51 - 55%.    Left ventricular diastolic function was indeterminate due to atrial fibrillation.    The left atrial cavity is mildly dilated.    There is trace to mild mitral regurgitation.    " Mild tricuspid valve regurgitation is present.  Estimated right ventricular systolic pressure from tricuspid regurgitation is normal (<35 mmHg).                   Assessment and Plan        Diagnoses and all orders for this visit:    1. Longstanding persistent atrial fibrillation (Primary)  Assessment & Plan:  She remains in atrial fibrillation with controlled ventricular rates.  She is currently back on 25 mg of metoprolol succinate twice daily, which will be continued.  [id number] 2 score is high, continue Eliquis for anticoagulation.  Counseled regarding medication compliance.  We will get the latest labs from PCP office.      2. Nonrheumatic mitral valve regurgitation  Assessment & Plan:  Mild mitral regurgitation echocardiogram done during hospital admission in February.  Systolic murmur audible.  Will continue to monitor, repeat echocardiogram in the next 2 to 3 years or earlier if symptomatic.      3. Chronic diastolic heart failure  Assessment & Plan:  She is clinically not volume overloaded.  Per current medication list, she is on 2 mg of Bumex, along with potassium supplementation.  Will continue the same, will get latest labs from PCP office.                Follow Up     Return in about 6 months (around 1/1/2025) for Next scheduled follow up.    Patient was given instructions and counseling regarding her condition or for health maintenance advice. Please see specific information pulled into the AVS if appropriate.

## 2024-07-01 NOTE — ASSESSMENT & PLAN NOTE
Mild mitral regurgitation echocardiogram done during hospital admission in February.  Systolic murmur audible.  Will continue to monitor, repeat echocardiogram in the next 2 to 3 years or earlier if symptomatic.

## 2024-07-01 NOTE — ASSESSMENT & PLAN NOTE
She remains in atrial fibrillation with controlled ventricular rates.  She is currently back on 25 mg of metoprolol succinate twice daily, which will be continued.  [id number] 2 score is high, continue Eliquis for anticoagulation.  Counseled regarding medication compliance.  We will get the latest labs from PCP office.

## 2024-07-01 NOTE — LETTER
July 1, 2024     AIDA Greer  [address] [place] [address]    Patient: Maria Del Rosario Colmenares   YOB: 1945   Date of Visit: 7/1/2024       Dear AIDA Greer    Maria Del Rosario Colmenares was in my office today. Below is a copy of my note.    If you have questions, please do not hesitate to call me. I look forward to following Maria Del Rosario along with you.         Sincerely,        Jermaine Powell MD        CC: AIDA Rico      CARDIOLOGY FOLLOW-UP PROGRESS NOTE        Chief Complaint  Atrial Fibrillation, Follow-up, and Hypertension    Subjective           Maria Del Rosario Colmenares presents to [facility] CARDIOLOGY  History of Present Illness    Ms Colmenares is here for a follow-up visit.  She had an admission to the hospital in February of this year following a fall.  She suffered a right hip fracture and underwent surgical correction.  During hospital stay, she developed rapid ventricular rates and needed Cardizem infusion for a day.  She also had fluid retention.  She was eventually discharged to rehab on higher dose of metoprolol.    Today, she reports feeling fine and almost back to her baseline.  She still needs a cane to walk.  She has no palpitations.  Occasionally she feels shortness of breath.  Currently, she has no pedal edema.  Her medication list is significantly different from discharge medication list.  She is currently on Bumex 2 mg daily and also back on lower dose of metoprolol.    Past History:    (1) Paroxysmal atrial fibrillation. (2) Hypothyroidism. (3) History of perforated appendicitis, status post surgery in 2019. (4) chronic combined heart failure (5) acute stroke involving right MCA territory with expressive aphasia and memory problems.  Episode happened in the setting of holding anticoagulation for epidural injections.     Medical History:  Past Medical History:   Diagnosis Date   • Arthritis    • Chronic combined systolic (congestive)  and diastolic (congestive) heart failure    • Hypothyroidism    • Mixed hyperlipidemia    • Paroxysmal atrial fibrillation    • SOB (shortness of breath)    • Thyroid disorder        Surgical History: has a past surgical history that includes Appendectomy; Colonoscopy; Hysterectomy; Abdominal hysterectomy; and Hip Trochanteric Nailing (Right, 2/5/2024).     Family History: family history includes Lung cancer in her mother and paternal grandmother; No Known Problems in her father.     Social History: reports that she has quit smoking. She has been exposed to tobacco smoke. She has never used smokeless tobacco. She reports current alcohol use. She reports that she does not use drugs.    Allergies: Patient has no known allergies.    Current Outpatient Medications on File Prior to Visit   Medication Sig   • acetaminophen (TYLENOL) 325 MG tablet Take 2 tablets by mouth Every 4 (Four) Hours As Needed for Mild Pain.   • Aspirin Adult Low Dose 81 MG EC tablet    • bethanechol (URECHOLINE) 25 MG tablet Take 1 tablet by mouth 3 (Three) Times a Day.   • Eliquis 5 MG tablet tablet Take 1 tablet by mouth 2 (two) times a day.   • metoprolol succinate XL (TOPROL-XL) 25 MG 24 hr tablet Take 1 tablet by mouth 2 (two) times a day.   • potassium chloride (KLOR-CON M20) 20 MEQ CR tablet Take 1 tablet by mouth Daily. with food   • rosuvastatin (CRESTOR) 20 MG tablet Take 1 tablet by mouth Every Night.   • Synthroid 75 MCG tablet Take 1 tablet by mouth Every Morning.   • vitamin D (ERGOCALCIFEROL) 1.25 MG (50268 UT) capsule capsule Take 1 capsule by mouth Every 7 (Seven) Days. TUESDAYS   • [DISCONTINUED] bumetanide (BUMEX) 1 MG tablet    • bumetanide (BUMEX) 2 MG tablet Take 1 tablet by mouth Daily.   • sennosides-docusate (PERICOLACE) 8.6-50 MG per tablet Take 2 tablets by mouth 2 (Two) Times a Day As Needed for Constipation. (Patient not taking: Reported on 7/1/2024)   • sulfamethoxazole-trimethoprim (BACTRIM DS,SEPTRA DS) 800-160 MG  "per tablet Take 1 tablet by mouth 2 (Two) Times a Day. (Patient not taking: Reported on 7/1/2024)         Review of Systems   Respiratory:  Positive for shortness of breath. Negative for cough and wheezing.    Cardiovascular:  Negative for chest pain, palpitations and leg swelling.   Gastrointestinal:  Negative for nausea and vomiting.   Musculoskeletal:  Positive for arthralgias.   Neurological:  Negative for dizziness and syncope.        Objective    /64 (BP Location: Left arm)   Pulse 95   Ht 170.2 cm (67\")   Wt 60.8 kg (134 lb)   BMI 20.99 kg/m²       Physical Exam    General : Alert, awake, no acute distress  Neck : Supple, no carotid bruit, no jugular venous distention  CVS : Regular rate and rhythm, systolic murmur present, rubs or gallops  Lungs: Clear to auscultation bilaterally, no crackles or rhonchi  Abdomen: Soft, nontender, bowel sounds heard in all 4 quadrants  Extremities: Warm, well-perfused, no pedal edema,bilateral engorged veins noted    Result Review:     The following data was reviewed by: Jermaine Powell MD on 07/01/2024:    CMP          2/7/2024    04:16 2/8/2024    04:16 2/9/2024    03:00   CMP   Glucose 140  121  131    BUN 35  25  16    Creatinine 1.12  0.88  0.75    EGFR 50.4  67.4  81.6    Sodium 134  132  134    Potassium 4.3  4.5  4.2    Chloride 99  97  97    Calcium 8.8  8.9  8.7    Total Protein   6.4    Albumin   3.3    Globulin   3.1    Total Bilirubin   0.8    Alkaline Phosphatase   84    AST (SGOT)   40    ALT (SGPT)   32    Albumin/Globulin Ratio   1.1    BUN/Creatinine Ratio 31.3  28.4  21.3    Anion Gap 11.4  7.5  10.5      CBC          2/7/2024    04:16 2/8/2024    04:16 2/9/2024    03:00   CBC   WBC 9.21  8.10  7.24    RBC 3.32  3.28  3.58    Hemoglobin 9.9  9.8  10.6    Hematocrit 31.6  30.7  33.3    MCV 95.2  93.6  93.0    MCH 29.8  29.9  29.6    MCHC 31.3  31.9  31.8    RDW 12.7  12.6  12.7    Platelets 190  202  220               Data reviewed: Cardiology " studies        Results for orders placed during the hospital encounter of 02/04/24    Adult Transthoracic Echo Complete w/ Color, Spectral and Contrast if necessary per protocol    Interpretation Summary  •  Left ventricular systolic function is normal. Left ventricular ejection fraction appears to be 51 - 55%.  •  Left ventricular diastolic function was indeterminate due to atrial fibrillation.  •  The left atrial cavity is mildly dilated.  •  There is trace to mild mitral regurgitation.  •  Mild tricuspid valve regurgitation is present.  Estimated right ventricular systolic pressure from tricuspid regurgitation is normal (<35 mmHg).                   Assessment and Plan        Diagnoses and all orders for this visit:    1. Longstanding persistent atrial fibrillation (Primary)  Assessment & Plan:  She remains in atrial fibrillation with controlled ventricular rates.  She is currently back on 25 mg of metoprolol succinate twice daily, which will be continued.  [id number] 2 score is high, continue Eliquis for anticoagulation.  Counseled regarding medication compliance.  We will get the latest labs from PCP office.      2. Nonrheumatic mitral valve regurgitation  Assessment & Plan:  Mild mitral regurgitation echocardiogram done during hospital admission in February.  Systolic murmur audible.  Will continue to monitor, repeat echocardiogram in the next 2 to 3 years or earlier if symptomatic.      3. Chronic diastolic heart failure  Assessment & Plan:  She is clinically not volume overloaded.  Per current medication list, she is on 2 mg of Bumex, along with potassium supplementation.  Will continue the same, will get latest labs from PCP office.                Follow Up     Return in about 6 months (around 1/1/2025) for Next scheduled follow up.    Patient was given instructions and counseling regarding her condition or for health maintenance advice. Please see specific information pulled into the AVS if appropriate.

## 2024-07-01 NOTE — ASSESSMENT & PLAN NOTE
She is clinically not volume overloaded.  Per current medication list, she is on 2 mg of Bumex, along with potassium supplementation.  Will continue the same, will get latest labs from PCP office.

## 2025-01-23 ENCOUNTER — OFFICE VISIT (OUTPATIENT)
Dept: CARDIOLOGY | Facility: CLINIC | Age: 80
End: 2025-01-23
Payer: MEDICARE

## 2025-01-23 VITALS
BODY MASS INDEX: 21.5 KG/M2 | HEIGHT: 67 IN | HEART RATE: 60 BPM | SYSTOLIC BLOOD PRESSURE: 164 MMHG | DIASTOLIC BLOOD PRESSURE: 88 MMHG | WEIGHT: 137 LBS

## 2025-01-23 DIAGNOSIS — I34.0 NONRHEUMATIC MITRAL VALVE REGURGITATION: ICD-10-CM

## 2025-01-23 DIAGNOSIS — I48.11 LONGSTANDING PERSISTENT ATRIAL FIBRILLATION: Primary | ICD-10-CM

## 2025-01-23 DIAGNOSIS — I50.32 CHRONIC DIASTOLIC HEART FAILURE: ICD-10-CM

## 2025-01-23 PROCEDURE — 99214 OFFICE O/P EST MOD 30 MIN: CPT | Performed by: INTERNAL MEDICINE

## 2025-01-23 PROCEDURE — 1159F MED LIST DOCD IN RCRD: CPT | Performed by: INTERNAL MEDICINE

## 2025-01-23 PROCEDURE — 1160F RVW MEDS BY RX/DR IN RCRD: CPT | Performed by: INTERNAL MEDICINE

## 2025-01-23 NOTE — ASSESSMENT & PLAN NOTE
She is in atrial fibrillation and ventricular rate is well-controlled.  Feels occasional palpitations.  We will continue metoprolol for rate control along with Eliquis for anticoagulation.  Will get the latest labs from PCP office.    Her blood pressure is on the higher side in the office today.  Was well-controlled during multiple previous visits.  As of now, we will continue metoprolol.  Encouraged to keep home blood pressure log is possible.

## 2025-01-23 NOTE — LETTER
January 23, 2025     AIDA Greer  [address] [place] [address]    Patient: Maria Del Rosario Colmenares   YOB: 1945   Date of Visit: 1/23/2025       Dear AIDA Greer    Maria Del Rosario Colmenares was in my office today. Below is a copy of my note.    If you have questions, please do not hesitate to call me. I look forward to following Maria Del Rosario along with you.         Sincerely,        Jermaine Powell MD        CC: AIDA Rico      CARDIOLOGY FOLLOW-UP PROGRESS NOTE        Chief Complaint  Follow-up and Atrial Fibrillation    Subjective           Maria Del Rosario Colmenares presents to [facility] CARDIOLOGY  History of Present Illness     is here for a routine follow-up visit.  She feels intermittent palpitations, mostly in the mornings.  Denies shortness of breath or dizziness.  She is able to walk around but uses a cane ever since she had a hip fracture.  Taking all the medications including Eliquis as prescribed.  She recently had labs from PCP office.      Past History:    Medical History:  Past Medical History:   Diagnosis Date   • Arthritis    • Chronic combined systolic (congestive) and diastolic (congestive) heart failure    • Hypothyroidism    • Mixed hyperlipidemia    • Paroxysmal atrial fibrillation    • SOB (shortness of breath)    • Thyroid disorder        Family History: family history includes Lung cancer in her mother and paternal grandmother; No Known Problems in her father.     Social History: reports that she has quit smoking. She has been exposed to tobacco smoke. She has never used smokeless tobacco. She reports current alcohol use. She reports that she does not use drugs.    Allergies: Patient has no known allergies.    Current Outpatient Medications on File Prior to Visit   Medication Sig   • acetaminophen (TYLENOL) 325 MG tablet Take 2 tablets by mouth Every 4 (Four) Hours As Needed for Mild Pain.   • Aspirin Adult Low Dose 81 MG  "EC tablet    • bethanechol (URECHOLINE) 25 MG tablet Take 1 tablet by mouth 3 (Three) Times a Day.   • bumetanide (BUMEX) 2 MG tablet Take 1 tablet by mouth Daily.   • Eliquis 5 MG tablet tablet Take 1 tablet by mouth 2 (two) times a day.   • metoprolol succinate XL (TOPROL-XL) 25 MG 24 hr tablet Take 1 tablet by mouth 2 (two) times a day.   • potassium chloride (KLOR-CON M20) 20 MEQ CR tablet Take 1 tablet by mouth Daily. with food   • rosuvastatin (CRESTOR) 20 MG tablet Take 1 tablet by mouth Every Night.   • sennosides-docusate (PERICOLACE) 8.6-50 MG per tablet Take 2 tablets by mouth 2 (Two) Times a Day As Needed for Constipation.   • Synthroid 75 MCG tablet Take 1 tablet by mouth Every Morning.   • vitamin D (ERGOCALCIFEROL) 1.25 MG (34396 UT) capsule capsule Take 1 capsule by mouth Every 7 (Seven) Days. TUESDAYS   • [DISCONTINUED] sulfamethoxazole-trimethoprim (BACTRIM DS,SEPTRA DS) 800-160 MG per tablet Take 1 tablet by mouth 2 (Two) Times a Day.     No current facility-administered medications on file prior to visit.          Review of Systems   Respiratory:  Negative for cough, shortness of breath and wheezing.    Cardiovascular:  Negative for chest pain, palpitations and leg swelling.   Gastrointestinal:  Negative for nausea and vomiting.   Neurological:  Negative for dizziness and syncope.        Objective    /88   Pulse 60   Ht 170.2 cm (67\")   Wt 62.1 kg (137 lb)   BMI 21.46 kg/m²       Physical Exam    General : Alert, awake, no acute distress  Neck : Supple, no carotid bruit, no jugular venous distention  CVS : Irregular, systolic murmur heard, no rubs or gallops  Lungs: Clear to auscultation bilaterally, no crackles or rhonchi  Abdomen: Soft, nontender, bowel sounds heard in all 4 quadrants  Extremities: Warm, well-perfused, no pedal edema, engorged veins noted bilaterally    Result Review:     The following data was reviewed by: Jermaine Powell MD on 01/23/2025:    CMP          2/7/2024 "    04:16 2/8/2024    04:16 2/9/2024    03:00   CMP   Glucose 140  121  131    BUN 35  25  16    Creatinine 1.12  0.88  0.75    EGFR 50.4  67.4  81.6    Sodium 134  132  134    Potassium 4.3  4.5  4.2    Chloride 99  97  97    Calcium 8.8  8.9  8.7    Total Protein   6.4    Albumin   3.3    Globulin   3.1    Total Bilirubin   0.8    Alkaline Phosphatase   84    AST (SGOT)   40    ALT (SGPT)   32    Albumin/Globulin Ratio   1.1    BUN/Creatinine Ratio 31.3  28.4  21.3    Anion Gap 11.4  7.5  10.5      CBC          2/7/2024    04:16 2/8/2024    04:16 2/9/2024    03:00   CBC   WBC 9.21  8.10  7.24    RBC 3.32  3.28  3.58    Hemoglobin 9.9  9.8  10.6    Hematocrit 31.6  30.7  33.3    MCV 95.2  93.6  93.0    MCH 29.8  29.9  29.6    MCHC 31.3  31.9  31.8    RDW 12.7  12.6  12.7    Platelets 190  202  220               Data reviewed: Cardiology studies        Results for orders placed during the hospital encounter of 02/04/24    Adult Transthoracic Echo Complete w/ Color, Spectral and Contrast if necessary per protocol    Interpretation Summary  •  Left ventricular systolic function is normal. Left ventricular ejection fraction appears to be 51 - 55%.  •  Left ventricular diastolic function was indeterminate due to atrial fibrillation.  •  The left atrial cavity is mildly dilated.  •  There is trace to mild mitral regurgitation.  •  Mild tricuspid valve regurgitation is present.  Estimated right ventricular systolic pressure from tricuspid regurgitation is normal (<35 mmHg).                   Assessment and Plan        Diagnoses and all orders for this visit:    1. Longstanding persistent atrial fibrillation (Primary)  Assessment & Plan:  She is in atrial fibrillation and ventricular rate is well-controlled.  Feels occasional palpitations.  We will continue metoprolol for rate control along with Eliquis for anticoagulation.  Will get the latest labs from PCP office.    Her blood pressure is on the higher side in the office  today.  Was well-controlled during multiple previous visits.  As of now, we will continue metoprolol.  Encouraged to keep home blood pressure log is possible.      2. Chronic diastolic heart failure  Assessment & Plan:  She currently has no pedal edema.  Denies any shortness of breath.  We will get the latest labs from PCP office.  Continue Bumex with potassium at the current dose.      3. Nonrheumatic mitral valve regurgitation  Assessment & Plan:  Systolic murmur audible.  She will need a repeat echocardiogram in 2026 to reevaluate the severity of the valvular lesion.                Follow Up     Return in about 6 months (around 7/23/2025) for Next scheduled follow up.    Patient was given instructions and counseling regarding her condition or for health maintenance advice. Please see specific information pulled into the AVS if appropriate.

## 2025-01-23 NOTE — ASSESSMENT & PLAN NOTE
She currently has no pedal edema.  Denies any shortness of breath.  We will get the latest labs from PCP office.  Continue Bumex with potassium at the current dose.

## 2025-01-23 NOTE — PROGRESS NOTES
CARDIOLOGY FOLLOW-UP PROGRESS NOTE        Chief Complaint  Follow-up and Atrial Fibrillation    Subjective            Maria Del Rosario Colmenares presents to [facility] CARDIOLOGY  History of Present Illness     is here for a routine follow-up visit.  She feels intermittent palpitations, mostly in the mornings.  Denies shortness of breath or dizziness.  She is able to walk around but uses a cane ever since she had a hip fracture.  Taking all the medications including Eliquis as prescribed.  She recently had labs from PCP office.      Past History:    Medical History:  Past Medical History:   Diagnosis Date    Arthritis     Chronic combined systolic (congestive) and diastolic (congestive) heart failure     Hypothyroidism     Mixed hyperlipidemia     Paroxysmal atrial fibrillation     SOB (shortness of breath)     Thyroid disorder        Family History: family history includes Lung cancer in her mother and paternal grandmother; No Known Problems in her father.     Social History: reports that she has quit smoking. She has been exposed to tobacco smoke. She has never used smokeless tobacco. She reports current alcohol use. She reports that she does not use drugs.    Allergies: Patient has no known allergies.    Current Outpatient Medications on File Prior to Visit   Medication Sig    acetaminophen (TYLENOL) 325 MG tablet Take 2 tablets by mouth Every 4 (Four) Hours As Needed for Mild Pain.    Aspirin Adult Low Dose 81 MG EC tablet     bethanechol (URECHOLINE) 25 MG tablet Take 1 tablet by mouth 3 (Three) Times a Day.    bumetanide (BUMEX) 2 MG tablet Take 1 tablet by mouth Daily.    Eliquis 5 MG tablet tablet Take 1 tablet by mouth 2 (two) times a day.    metoprolol succinate XL (TOPROL-XL) 25 MG 24 hr tablet Take 1 tablet by mouth 2 (two) times a day.    potassium chloride (KLOR-CON M20) 20 MEQ CR tablet Take 1 tablet by mouth Daily. with food    rosuvastatin (CRESTOR) 20 MG tablet Take 1 tablet by  "mouth Every Night.    sennosides-docusate (PERICOLACE) 8.6-50 MG per tablet Take 2 tablets by mouth 2 (Two) Times a Day As Needed for Constipation.    Synthroid 75 MCG tablet Take 1 tablet by mouth Every Morning.    vitamin D (ERGOCALCIFEROL) 1.25 MG (00885 UT) capsule capsule Take 1 capsule by mouth Every 7 (Seven) Days. TUESDAYS    [DISCONTINUED] sulfamethoxazole-trimethoprim (BACTRIM DS,SEPTRA DS) 800-160 MG per tablet Take 1 tablet by mouth 2 (Two) Times a Day.     No current facility-administered medications on file prior to visit.          Review of Systems   Respiratory:  Negative for cough, shortness of breath and wheezing.    Cardiovascular:  Negative for chest pain, palpitations and leg swelling.   Gastrointestinal:  Negative for nausea and vomiting.   Neurological:  Negative for dizziness and syncope.        Objective     /88   Pulse 60   Ht 170.2 cm (67\")   Wt 62.1 kg (137 lb)   BMI 21.46 kg/m²       Physical Exam    General : Alert, awake, no acute distress  Neck : Supple, no carotid bruit, no jugular venous distention  CVS : Irregular, systolic murmur heard, no rubs or gallops  Lungs: Clear to auscultation bilaterally, no crackles or rhonchi  Abdomen: Soft, nontender, bowel sounds heard in all 4 quadrants  Extremities: Warm, well-perfused, no pedal edema, engorged veins noted bilaterally    Result Review :     The following data was reviewed by: Jermaine Powell MD on 01/23/2025:    CMP          2/7/2024    04:16 2/8/2024    04:16 2/9/2024    03:00   CMP   Glucose 140  121  131    BUN 35  25  16    Creatinine 1.12  0.88  0.75    EGFR 50.4  67.4  81.6    Sodium 134  132  134    Potassium 4.3  4.5  4.2    Chloride 99  97  97    Calcium 8.8  8.9  8.7    Total Protein   6.4    Albumin   3.3    Globulin   3.1    Total Bilirubin   0.8    Alkaline Phosphatase   84    AST (SGOT)   40    ALT (SGPT)   32    Albumin/Globulin Ratio   1.1    BUN/Creatinine Ratio 31.3  28.4  21.3    Anion Gap 11.4  7.5  " 10.5      CBC          2/7/2024    04:16 2/8/2024    04:16 2/9/2024    03:00   CBC   WBC 9.21  8.10  7.24    RBC 3.32  3.28  3.58    Hemoglobin 9.9  9.8  10.6    Hematocrit 31.6  30.7  33.3    MCV 95.2  93.6  93.0    MCH 29.8  29.9  29.6    MCHC 31.3  31.9  31.8    RDW 12.7  12.6  12.7    Platelets 190  202  220               Data reviewed: Cardiology studies        Results for orders placed during the hospital encounter of 02/04/24    Adult Transthoracic Echo Complete w/ Color, Spectral and Contrast if necessary per protocol    Interpretation Summary    Left ventricular systolic function is normal. Left ventricular ejection fraction appears to be 51 - 55%.    Left ventricular diastolic function was indeterminate due to atrial fibrillation.    The left atrial cavity is mildly dilated.    There is trace to mild mitral regurgitation.    Mild tricuspid valve regurgitation is present.  Estimated right ventricular systolic pressure from tricuspid regurgitation is normal (<35 mmHg).                   Assessment and Plan        Diagnoses and all orders for this visit:    1. Longstanding persistent atrial fibrillation (Primary)  Assessment & Plan:  She is in atrial fibrillation and ventricular rate is well-controlled.  Feels occasional palpitations.  We will continue metoprolol for rate control along with Eliquis for anticoagulation.  Will get the latest labs from PCP office.    Her blood pressure is on the higher side in the office today.  Was well-controlled during multiple previous visits.  As of now, we will continue metoprolol.  Encouraged to keep home blood pressure log is possible.      2. Chronic diastolic heart failure  Assessment & Plan:  She currently has no pedal edema.  Denies any shortness of breath.  We will get the latest labs from PCP office.  Continue Bumex with potassium at the current dose.      3. Nonrheumatic mitral valve regurgitation  Assessment & Plan:  Systolic murmur audible.  She will need a  repeat echocardiogram in 2026 to reevaluate the severity of the valvular lesion.                Follow Up     Return in about 6 months (around 7/23/2025) for Next scheduled follow up.    Patient was given instructions and counseling regarding her condition or for health maintenance advice. Please see specific information pulled into the AVS if appropriate.

## 2025-01-23 NOTE — ASSESSMENT & PLAN NOTE
Systolic murmur audible.  She will need a repeat echocardiogram in 2026 to reevaluate the severity of the valvular lesion.

## 2025-07-31 ENCOUNTER — OFFICE VISIT (OUTPATIENT)
Dept: CARDIOLOGY | Facility: CLINIC | Age: 80
End: 2025-07-31
Payer: MEDICARE

## 2025-07-31 VITALS
BODY MASS INDEX: 21.82 KG/M2 | HEART RATE: 89 BPM | WEIGHT: 139 LBS | HEIGHT: 67 IN | DIASTOLIC BLOOD PRESSURE: 78 MMHG | SYSTOLIC BLOOD PRESSURE: 144 MMHG

## 2025-07-31 DIAGNOSIS — I48.11 LONGSTANDING PERSISTENT ATRIAL FIBRILLATION: Primary | ICD-10-CM

## 2025-07-31 DIAGNOSIS — I50.32 CHRONIC DIASTOLIC HEART FAILURE: ICD-10-CM

## 2025-07-31 DIAGNOSIS — I34.0 NONRHEUMATIC MITRAL VALVE REGURGITATION: ICD-10-CM

## 2025-07-31 PROCEDURE — 1159F MED LIST DOCD IN RCRD: CPT | Performed by: INTERNAL MEDICINE

## 2025-07-31 PROCEDURE — 99214 OFFICE O/P EST MOD 30 MIN: CPT | Performed by: INTERNAL MEDICINE

## 2025-07-31 PROCEDURE — 1160F RVW MEDS BY RX/DR IN RCRD: CPT | Performed by: INTERNAL MEDICINE

## 2025-07-31 NOTE — LETTER
August 3, 2025     Annmarie David, APRN  [address] [place] [address]    Patient: Maria Del Rosario Colmenares   YOB: 1945   Date of Visit: 7/31/2025     Dear Annmarie David, AIDA:       Thank you for referring Maria Del Rosario Colmenares to me for evaluation. Below are the relevant portions of my assessment and plan of care.    If you have questions, please do not hesitate to call me. I look forward to following Maria Del Rosairo along with you.         Sincerely,        Jermaine Powell MD        CC: No Recipients    Jermaine Powell MD  08/03/25 0845  Sign when Signing Visit    CARDIOLOGY FOLLOW-UP PROGRESS NOTE        Chief Complaint  Follow-up, Atrial Fibrillation, and Cardiac Valve Problem    Subjective           Maria Del Rosario Colmenares presents to [facility] CARDIOLOGY  History of Present Illness    Ms Colmenares is here for routine follow-up visit.  Overall she feels fine.  Denies any palpitations or dizziness.  No chest pain.  No major shortness of breath.  Taking all the medications as prescribed.  She is planning for a trip to [place] soon.    Past History:    (1) Paroxysmal atrial fibrillation. (2) Hypothyroidism. (3) History of perforated appendicitis, status post surgery in 2019. (4) chronic combined heart failure (5) acute stroke involving right MCA territory with expressive aphasia and memory problems.  Episode happened in the setting of holding anticoagulation for epidural injections     Medical History:  Past Medical History:   Diagnosis Date   • Arthritis    • Chronic combined systolic (congestive) and diastolic (congestive) heart failure    • Hypothyroidism    • Mixed hyperlipidemia    • Paroxysmal atrial fibrillation    • SOB (shortness of breath)    • Thyroid disorder        Family History: family history includes Lung cancer in her mother and paternal grandmother; No Known Problems in her father.     Social History: reports that she has quit smoking. She has been exposed  "to tobacco smoke. She has never used smokeless tobacco. She reports current alcohol use. She reports that she does not use drugs.    Allergies: Patient has no known allergies.    Current Outpatient Medications on File Prior to Visit   Medication Sig   • acetaminophen (TYLENOL) 325 MG tablet Take 2 tablets by mouth Every 4 (Four) Hours As Needed for Mild Pain.   • Aspirin Adult Low Dose 81 MG EC tablet    • bethanechol (URECHOLINE) 25 MG tablet Take 1 tablet by mouth 3 (Three) Times a Day.   • bumetanide (BUMEX) 2 MG tablet Take 1 tablet by mouth Daily.   • Eliquis 5 MG tablet tablet Take 1 tablet by mouth 2 (two) times a day.   • metoprolol succinate XL (TOPROL-XL) 25 MG 24 hr tablet Take 1 tablet by mouth 2 (two) times a day.   • potassium chloride (KLOR-CON M20) 20 MEQ CR tablet Take 1 tablet by mouth Daily. with food   • rosuvastatin (CRESTOR) 20 MG tablet Take 1 tablet by mouth Every Night.   • sennosides-docusate (PERICOLACE) 8.6-50 MG per tablet Take 2 tablets by mouth 2 (Two) Times a Day As Needed for Constipation.   • Synthroid 75 MCG tablet Take 1 tablet by mouth Every Morning.   • vitamin D (ERGOCALCIFEROL) 1.25 MG (43695 UT) capsule capsule Take 1 capsule by mouth Every 7 (Seven) Days. TUESDAYS     No current facility-administered medications on file prior to visit.          Review of Systems   Respiratory:  Negative for cough, shortness of breath and wheezing.    Cardiovascular:  Negative for chest pain, palpitations and leg swelling.   Gastrointestinal:  Negative for nausea and vomiting.   Neurological:  Negative for dizziness and syncope.        Objective    /78   Pulse 89   Ht 170.2 cm (67\")   Wt 63 kg (139 lb)   BMI 21.77 kg/m²       Physical Exam    General : Alert, awake, no acute distress  Neck : Supple, no carotid bruit, no jugular venous distention  CVS : Irregular, 3/6 pansystolic murmur heard, no S3  Lungs: Clear to auscultation bilaterally, no crackles or rhonchi  Abdomen: Soft, " nontender, bowel sounds heard in all 4 quadrants  Extremities: Warm, well-perfused, no pedal edema    Result Review:     The following data was reviewed by: Jermaine Powell MD on 07/31/2025:                        Data reviewed: Cardiology studies        Results for orders placed during the hospital encounter of 02/04/24    Adult Transthoracic Echo Complete w/ Color, Spectral and Contrast if necessary per protocol    Interpretation Summary  •  Left ventricular systolic function is normal. Left ventricular ejection fraction appears to be 51 - 55%.  •  Left ventricular diastolic function was indeterminate due to atrial fibrillation.  •  The left atrial cavity is mildly dilated.  •  There is trace to mild mitral regurgitation.  •  Mild tricuspid valve regurgitation is present.  Estimated right ventricular systolic pressure from tricuspid regurgitation is normal (<35 mmHg).                   Assessment and Plan        Diagnoses and all orders for this visit:    1. Longstanding persistent atrial fibrillation (Primary)  Assessment & Plan:  She is in irregular rhythm on auscultation, ventricular rates are well-controlled.  Denies any major palpitations.  Will continue Eliquis for anticoagulation.  Continue metoprolol for rate control.  Recent labs done at PCP office showed stable hemoglobin and renal functions.    If patient loses weight and reaches below 60 kg, will reduce the dose of Eliquis to 2.5 mg twice daily per guidelines.      2. Chronic diastolic heart failure  Assessment & Plan:  Pedal edema completely subsided, denies any major shortness of breath.  Will continue Bumex 2 mg daily.      3. Nonrheumatic mitral valve regurgitation  Assessment & Plan:  Systolic murmur audible.  Will repeat echocardiogram again in 2026 to reevaluate severity of valvular lesion                Follow Up     Return in about 6 months (around 1/31/2026) for Next scheduled follow up.    Patient was given instructions and counseling  regarding her condition or for health maintenance advice. Please see specific information pulled into the AVS if appropriate.

## 2025-07-31 NOTE — LETTER
August 3, 2025     Annmarie David, AIDA  [address] [place] [address]    Patient: Maria Del Rosario Colmenares   YOB: 1945   Date of Visit: 7/31/2025       Dear AIDA Greer    Maria Del Rosario Colmenares was in my office today. Below is a copy of my note.    If you have questions, please do not hesitate to call me. I look forward to following Maria Del Rosario along with you.         Sincerely,        Jermaine Powell MD        CC: No Recipients      CARDIOLOGY FOLLOW-UP PROGRESS NOTE        Chief Complaint  Follow-up, Atrial Fibrillation, and Cardiac Valve Problem    Subjective    {Problem List  Visit Diagnosis   Encounters  Notes  Medications  Labs  Result Review Imaging  Media :23}       Maria Del Rosario Colmenares presents to [facility] CARDIOLOGY  History of Present Illness    Ms Colmenares is here for routine follow-up visit.  Overall she feels fine.  Denies any palpitations or dizziness.  No chest pain.  No major shortness of breath.  Taking all the medications as prescribed.  She is planning for a trip to [place] soon.    Past History:    (1) Paroxysmal atrial fibrillation. (2) Hypothyroidism. (3) History of perforated appendicitis, status post surgery in 2019. (4) chronic combined heart failure (5) acute stroke involving right MCA territory with expressive aphasia and memory problems.  Episode happened in the setting of holding anticoagulation for epidural injections     Medical History:  Past Medical History:   Diagnosis Date   • Arthritis    • Chronic combined systolic (congestive) and diastolic (congestive) heart failure    • Hypothyroidism    • Mixed hyperlipidemia    • Paroxysmal atrial fibrillation    • SOB (shortness of breath)    • Thyroid disorder        Family History: family history includes Lung cancer in her mother and paternal grandmother; No Known Problems in her father.     Social History: reports that she has quit smoking. She has been exposed to tobacco  "smoke. She has never used smokeless tobacco. She reports current alcohol use. She reports that she does not use drugs.    Allergies: Patient has no known allergies.    Current Outpatient Medications on File Prior to Visit   Medication Sig   • acetaminophen (TYLENOL) 325 MG tablet Take 2 tablets by mouth Every 4 (Four) Hours As Needed for Mild Pain.   • Aspirin Adult Low Dose 81 MG EC tablet    • bethanechol (URECHOLINE) 25 MG tablet Take 1 tablet by mouth 3 (Three) Times a Day.   • bumetanide (BUMEX) 2 MG tablet Take 1 tablet by mouth Daily.   • Eliquis 5 MG tablet tablet Take 1 tablet by mouth 2 (two) times a day.   • metoprolol succinate XL (TOPROL-XL) 25 MG 24 hr tablet Take 1 tablet by mouth 2 (two) times a day.   • potassium chloride (KLOR-CON M20) 20 MEQ CR tablet Take 1 tablet by mouth Daily. with food   • rosuvastatin (CRESTOR) 20 MG tablet Take 1 tablet by mouth Every Night.   • sennosides-docusate (PERICOLACE) 8.6-50 MG per tablet Take 2 tablets by mouth 2 (Two) Times a Day As Needed for Constipation.   • Synthroid 75 MCG tablet Take 1 tablet by mouth Every Morning.   • vitamin D (ERGOCALCIFEROL) 1.25 MG (12716 UT) capsule capsule Take 1 capsule by mouth Every 7 (Seven) Days. TUESDAYS     No current facility-administered medications on file prior to visit.          Review of Systems   Respiratory:  Negative for cough, shortness of breath and wheezing.    Cardiovascular:  Negative for chest pain, palpitations and leg swelling.   Gastrointestinal:  Negative for nausea and vomiting.   Neurological:  Negative for dizziness and syncope.        Objective    /78   Pulse 89   Ht 170.2 cm (67\")   Wt 63 kg (139 lb)   BMI 21.77 kg/m²       Physical Exam    General : Alert, awake, no acute distress  Neck : Supple, no carotid bruit, no jugular venous distention  CVS : Irregular, 3/6 pansystolic murmur heard, no S3  Lungs: Clear to auscultation bilaterally, no crackles or rhonchi  Abdomen: Soft, nontender, " bowel sounds heard in all 4 quadrants  Extremities: Warm, well-perfused, no pedal edema    Result Review:{Labs  Result Review  Imaging  Med Tab  Media :23}     The following data was reviewed by: Jermaine Powell MD on 07/31/2025:                        Data reviewed: Cardiology studies        Results for orders placed during the hospital encounter of 02/04/24    Adult Transthoracic Echo Complete w/ Color, Spectral and Contrast if necessary per protocol    Interpretation Summary  •  Left ventricular systolic function is normal. Left ventricular ejection fraction appears to be 51 - 55%.  •  Left ventricular diastolic function was indeterminate due to atrial fibrillation.  •  The left atrial cavity is mildly dilated.  •  There is trace to mild mitral regurgitation.  •  Mild tricuspid valve regurgitation is present.  Estimated right ventricular systolic pressure from tricuspid regurgitation is normal (<35 mmHg).                   Assessment and Plan {CC Problem List  Visit Diagnosis  ROS  Review (Popup)  SueEasy Maintenance  Quality  BestPractice  Medications  SmartSets  SnapShot Encounters  Media :23}       Diagnoses and all orders for this visit:    1. Longstanding persistent atrial fibrillation (Primary)  Assessment & Plan:  She is in irregular rhythm on auscultation, ventricular rates are well-controlled.  Denies any major palpitations.  Will continue Eliquis for anticoagulation.  Continue metoprolol for rate control.  Recent labs done at PCP office showed stable hemoglobin and renal functions.    If patient loses weight and reaches below 60 kg, will reduce the dose of Eliquis to 2.5 mg twice daily per guidelines.      2. Chronic diastolic heart failure  Assessment & Plan:  Pedal edema completely subsided, denies any major shortness of breath.  Will continue Bumex 2 mg daily.      3. Nonrheumatic mitral valve regurgitation  Assessment & Plan:  Systolic murmur audible.  Will repeat echocardiogram  again in 2026 to reevaluate severity of valvular lesion                Follow Up {Instructions Charge Capture  Follow-up Communications :23}    Return in about 6 months (around 1/31/2026) for Next scheduled follow up.    Patient was given instructions and counseling regarding her condition or for health maintenance advice. Please see specific information pulled into the AVS if appropriate.

## 2025-08-03 NOTE — ASSESSMENT & PLAN NOTE
She is in irregular rhythm on auscultation, ventricular rates are well-controlled.  Denies any major palpitations.  Will continue Eliquis for anticoagulation.  Continue metoprolol for rate control.  Recent labs done at PCP office showed stable hemoglobin and renal functions.    If patient loses weight and reaches below 60 kg, will reduce the dose of Eliquis to 2.5 mg twice daily per guidelines.

## 2025-08-03 NOTE — ASSESSMENT & PLAN NOTE
Pedal edema completely subsided, denies any major shortness of breath.  Will continue Bumex 2 mg daily.

## 2025-08-03 NOTE — ASSESSMENT & PLAN NOTE
Systolic murmur audible.  Will repeat echocardiogram again in 2026 to reevaluate severity of valvular lesion

## 2025-08-03 NOTE — PROGRESS NOTES
CARDIOLOGY FOLLOW-UP PROGRESS NOTE        Chief Complaint  Follow-up, Atrial Fibrillation, and Cardiac Valve Problem    Subjective            Maria Del Rosario Colmenares presents to [facility] CARDIOLOGY  History of Present Illness    Ms Colmenares is here for routine follow-up visit.  Overall she feels fine.  Denies any palpitations or dizziness.  No chest pain.  No major shortness of breath.  Taking all the medications as prescribed.  She is planning for a trip to [place] soon.    Past History:    (1) Paroxysmal atrial fibrillation. (2) Hypothyroidism. (3) History of perforated appendicitis, status post surgery in 2019. (4) chronic combined heart failure (5) acute stroke involving right MCA territory with expressive aphasia and memory problems.  Episode happened in the setting of holding anticoagulation for epidural injections     Medical History:  Past Medical History:   Diagnosis Date    Arthritis     Chronic combined systolic (congestive) and diastolic (congestive) heart failure     Hypothyroidism     Mixed hyperlipidemia     Paroxysmal atrial fibrillation     SOB (shortness of breath)     Thyroid disorder        Family History: family history includes Lung cancer in her mother and paternal grandmother; No Known Problems in her father.     Social History: reports that she has quit smoking. She has been exposed to tobacco smoke. She has never used smokeless tobacco. She reports current alcohol use. She reports that she does not use drugs.    Allergies: Patient has no known allergies.    Current Outpatient Medications on File Prior to Visit   Medication Sig    acetaminophen (TYLENOL) 325 MG tablet Take 2 tablets by mouth Every 4 (Four) Hours As Needed for Mild Pain.    Aspirin Adult Low Dose 81 MG EC tablet     bethanechol (URECHOLINE) 25 MG tablet Take 1 tablet by mouth 3 (Three) Times a Day.    bumetanide (BUMEX) 2 MG tablet Take 1 tablet by mouth Daily.    Eliquis 5 MG tablet tablet Take 1 tablet  "by mouth 2 (two) times a day.    metoprolol succinate XL (TOPROL-XL) 25 MG 24 hr tablet Take 1 tablet by mouth 2 (two) times a day.    potassium chloride (KLOR-CON M20) 20 MEQ CR tablet Take 1 tablet by mouth Daily. with food    rosuvastatin (CRESTOR) 20 MG tablet Take 1 tablet by mouth Every Night.    sennosides-docusate (PERICOLACE) 8.6-50 MG per tablet Take 2 tablets by mouth 2 (Two) Times a Day As Needed for Constipation.    Synthroid 75 MCG tablet Take 1 tablet by mouth Every Morning.    vitamin D (ERGOCALCIFEROL) 1.25 MG (40291 UT) capsule capsule Take 1 capsule by mouth Every 7 (Seven) Days. TUESDAYS     No current facility-administered medications on file prior to visit.          Review of Systems   Respiratory:  Negative for cough, shortness of breath and wheezing.    Cardiovascular:  Negative for chest pain, palpitations and leg swelling.   Gastrointestinal:  Negative for nausea and vomiting.   Neurological:  Negative for dizziness and syncope.        Objective     /78   Pulse 89   Ht 170.2 cm (67\")   Wt 63 kg (139 lb)   BMI 21.77 kg/m²       Physical Exam    General : Alert, awake, no acute distress  Neck : Supple, no carotid bruit, no jugular venous distention  CVS : Irregular, 3/6 pansystolic murmur heard, no S3  Lungs: Clear to auscultation bilaterally, no crackles or rhonchi  Abdomen: Soft, nontender, bowel sounds heard in all 4 quadrants  Extremities: Warm, well-perfused, no pedal edema    Result Review :     The following data was reviewed by: Jermaine Powell MD on 07/31/2025:                        Data reviewed: Cardiology studies        Results for orders placed during the hospital encounter of 02/04/24    Adult Transthoracic Echo Complete w/ Color, Spectral and Contrast if necessary per protocol    Interpretation Summary    Left ventricular systolic function is normal. Left ventricular ejection fraction appears to be 51 - 55%.    Left ventricular diastolic function was indeterminate " due to atrial fibrillation.    The left atrial cavity is mildly dilated.    There is trace to mild mitral regurgitation.    Mild tricuspid valve regurgitation is present.  Estimated right ventricular systolic pressure from tricuspid regurgitation is normal (<35 mmHg).                   Assessment and Plan        Diagnoses and all orders for this visit:    1. Longstanding persistent atrial fibrillation (Primary)  Assessment & Plan:  She is in irregular rhythm on auscultation, ventricular rates are well-controlled.  Denies any major palpitations.  Will continue Eliquis for anticoagulation.  Continue metoprolol for rate control.  Recent labs done at PCP office showed stable hemoglobin and renal functions.    If patient loses weight and reaches below 60 kg, will reduce the dose of Eliquis to 2.5 mg twice daily per guidelines.      2. Chronic diastolic heart failure  Assessment & Plan:  Pedal edema completely subsided, denies any major shortness of breath.  Will continue Bumex 2 mg daily.      3. Nonrheumatic mitral valve regurgitation  Assessment & Plan:  Systolic murmur audible.  Will repeat echocardiogram again in 2026 to reevaluate severity of valvular lesion                Follow Up     Return in about 6 months (around 1/31/2026) for Next scheduled follow up.    Patient was given instructions and counseling regarding her condition or for health maintenance advice. Please see specific information pulled into the AVS if appropriate.

## (undated) DEVICE — GLV SURG PROTEXIS SYNTH PI LF PF 7.5

## (undated) DEVICE — DRP SURG U/DRP INVISISHIELD PA 48X52IN

## (undated) DEVICE — BNDG RL GZ BULKEE2 4.5IN 4.1YD STRL

## (undated) DEVICE — DRP SURG 70X85IN LG STRL

## (undated) DEVICE — DRSNG GZ PETROLTM XEROFORM CURAD 1X8IN STRL

## (undated) DEVICE — Device

## (undated) DEVICE — PK EXTREM CUST HAR

## (undated) DEVICE — GOWN SURG IMPERV  XLG

## (undated) DEVICE — GLV SURG SENSICARE PI ORTHO SZ8 LF STRL

## (undated) DEVICE — SUT VIC PLS CTD BR 1 CT1 27IN VIL

## (undated) DEVICE — PENCL E/S SMOKEEVAC W/TELESCP CANN

## (undated) DEVICE — SPNG GZ STRL 2S 4X4 16PLY

## (undated) DEVICE — GUIDEPIN TEMP THRD 3.2X508MM DISP

## (undated) DEVICE — DRP IOBAN ANTIMICRO 23X17IN

## (undated) DEVICE — SUT VIC 2/0 CP2 J869H

## (undated) DEVICE — BLD SCLPL RIBBACK C/SS SZ10

## (undated) DEVICE — CVR LEG BOOTLEG F/R NOSKID 33IN

## (undated) DEVICE — PAD GRND REM POLYHESIVE A/ DISP

## (undated) DEVICE — GLV SURG PROTEXIS PI BLU NEUTHERA PF 8

## (undated) DEVICE — BLANKT WARM PACU MISTRAL/AIR PREM REFL A/ 85.8X50IN

## (undated) DEVICE — BNDG ELAS ECON W/CLIP 4IN 5YD LF STRL

## (undated) DEVICE — DRP SURG U/DRP IMPERV 54X76IN STRL

## (undated) DEVICE — DRSNG SURESITE WNDW 4X4.5

## (undated) DEVICE — APPL CHLORAPREP HI/LITE 26ML ORNG

## (undated) DEVICE — DRP IOBAN W/DRN TBG HLDR 125X83IN

## (undated) DEVICE — STPLR SKIN PROXIMATE RH WD

## (undated) DEVICE — GW TEAR DROP NAT 3X100CM

## (undated) DEVICE — MAT FLR ABS W/BLU/LINER 56X72IN WHT